# Patient Record
Sex: MALE | Race: WHITE | NOT HISPANIC OR LATINO | Employment: OTHER | URBAN - NONMETROPOLITAN AREA
[De-identification: names, ages, dates, MRNs, and addresses within clinical notes are randomized per-mention and may not be internally consistent; named-entity substitution may affect disease eponyms.]

---

## 2023-08-31 ENCOUNTER — HOSPITAL ENCOUNTER (INPATIENT)
Facility: HOSPITAL | Age: 78
LOS: 2 days | Discharge: HOME HEALTH | DRG: 177 | End: 2023-09-05
Attending: INTERNAL MEDICINE | Admitting: STUDENT IN AN ORGANIZED HEALTH CARE EDUCATION/TRAINING PROGRAM
Payer: MEDICARE

## 2023-08-31 ENCOUNTER — APPOINTMENT (OUTPATIENT)
Dept: CT IMAGING | Facility: HOSPITAL | Age: 78
DRG: 177 | End: 2023-08-31
Payer: MEDICARE

## 2023-08-31 ENCOUNTER — APPOINTMENT (OUTPATIENT)
Dept: GENERAL RADIOLOGY | Facility: HOSPITAL | Age: 78
DRG: 177 | End: 2023-08-31
Payer: MEDICARE

## 2023-08-31 DIAGNOSIS — Z74.09 IMPAIRED MOBILITY: ICD-10-CM

## 2023-08-31 DIAGNOSIS — R41.82 ALTERED MENTAL STATUS, UNSPECIFIED ALTERED MENTAL STATUS TYPE: Primary | ICD-10-CM

## 2023-08-31 DIAGNOSIS — R13.13 DYSPHAGIA, PHARYNGEAL PHASE: ICD-10-CM

## 2023-08-31 DIAGNOSIS — E11.69 TYPE 2 DIABETES MELLITUS WITH OTHER SPECIFIED COMPLICATION, UNSPECIFIED WHETHER LONG TERM INSULIN USE: ICD-10-CM

## 2023-08-31 PROBLEM — E11.9 DM2 (DIABETES MELLITUS, TYPE 2): Status: ACTIVE | Noted: 2023-08-31

## 2023-08-31 PROBLEM — I61.9 BRAIN BLEED: Status: ACTIVE | Noted: 2023-08-31

## 2023-08-31 PROBLEM — I10 BENIGN ESSENTIAL HTN: Status: ACTIVE | Noted: 2023-08-31

## 2023-08-31 PROBLEM — F03.90 DEMENTIA: Status: ACTIVE | Noted: 2023-08-31

## 2023-08-31 PROBLEM — E78.5 HYPERLIPIDEMIA: Status: ACTIVE | Noted: 2023-08-31

## 2023-08-31 PROBLEM — U07.1 COVID-19 VIRUS DETECTED: Status: ACTIVE | Noted: 2023-08-31

## 2023-08-31 LAB
ABO GROUP BLD: NORMAL
ALBUMIN SERPL-MCNC: 4.5 G/DL (ref 3.5–5.2)
ALBUMIN/GLOB SERPL: 1.6 G/DL
ALP SERPL-CCNC: 87 U/L (ref 39–117)
ALT SERPL W P-5'-P-CCNC: 19 U/L (ref 1–41)
ANION GAP SERPL CALCULATED.3IONS-SCNC: 12 MMOL/L (ref 5–15)
APTT PPP: 30.1 SECONDS (ref 24.1–35)
AST SERPL-CCNC: 18 U/L (ref 1–40)
B PARAPERT DNA SPEC QL NAA+PROBE: NOT DETECTED
B PERT DNA SPEC QL NAA+PROBE: NOT DETECTED
BASOPHILS # BLD AUTO: 0.03 10*3/MM3 (ref 0–0.2)
BASOPHILS NFR BLD AUTO: 0.3 % (ref 0–1.5)
BILIRUB SERPL-MCNC: 0.5 MG/DL (ref 0–1.2)
BILIRUB UR QL STRIP: NEGATIVE
BLD GP AB SCN SERPL QL: NEGATIVE
BUN SERPL-MCNC: 22 MG/DL (ref 8–23)
BUN/CREAT SERPL: 26.8 (ref 7–25)
C PNEUM DNA NPH QL NAA+NON-PROBE: NOT DETECTED
CALCIUM SPEC-SCNC: 9.9 MG/DL (ref 8.6–10.5)
CHLORIDE SERPL-SCNC: 96 MMOL/L (ref 98–107)
CLARITY UR: CLEAR
CO2 SERPL-SCNC: 26 MMOL/L (ref 22–29)
COLOR UR: YELLOW
CREAT SERPL-MCNC: 0.82 MG/DL (ref 0.76–1.27)
DEPRECATED RDW RBC AUTO: 44 FL (ref 37–54)
EGFRCR SERPLBLD CKD-EPI 2021: 88.8 ML/MIN/1.73
EOSINOPHIL # BLD AUTO: 0.01 10*3/MM3 (ref 0–0.4)
EOSINOPHIL NFR BLD AUTO: 0.1 % (ref 0.3–6.2)
ERYTHROCYTE [DISTWIDTH] IN BLOOD BY AUTOMATED COUNT: 13.1 % (ref 12.3–15.4)
FLUAV SUBTYP SPEC NAA+PROBE: NOT DETECTED
FLUBV RNA ISLT QL NAA+PROBE: NOT DETECTED
GLOBULIN UR ELPH-MCNC: 2.8 GM/DL
GLUCOSE BLDC GLUCOMTR-MCNC: 191 MG/DL (ref 70–130)
GLUCOSE SERPL-MCNC: 286 MG/DL (ref 65–99)
GLUCOSE UR STRIP-MCNC: ABNORMAL MG/DL
HADV DNA SPEC NAA+PROBE: NOT DETECTED
HCOV 229E RNA SPEC QL NAA+PROBE: NOT DETECTED
HCOV HKU1 RNA SPEC QL NAA+PROBE: NOT DETECTED
HCOV NL63 RNA SPEC QL NAA+PROBE: NOT DETECTED
HCOV OC43 RNA SPEC QL NAA+PROBE: NOT DETECTED
HCT VFR BLD AUTO: 46.5 % (ref 37.5–51)
HGB BLD-MCNC: 15 G/DL (ref 13–17.7)
HGB UR QL STRIP.AUTO: NEGATIVE
HMPV RNA NPH QL NAA+NON-PROBE: NOT DETECTED
HOLD SPECIMEN: NORMAL
HOLD SPECIMEN: NORMAL
HPIV1 RNA ISLT QL NAA+PROBE: NOT DETECTED
HPIV2 RNA SPEC QL NAA+PROBE: NOT DETECTED
HPIV3 RNA NPH QL NAA+PROBE: NOT DETECTED
HPIV4 P GENE NPH QL NAA+PROBE: NOT DETECTED
IMM GRANULOCYTES # BLD AUTO: 0.03 10*3/MM3 (ref 0–0.05)
IMM GRANULOCYTES NFR BLD AUTO: 0.3 % (ref 0–0.5)
INR PPP: 0.94 (ref 0.91–1.09)
KETONES UR QL STRIP: NEGATIVE
LEUKOCYTE ESTERASE UR QL STRIP.AUTO: NEGATIVE
LYMPHOCYTES # BLD AUTO: 0.82 10*3/MM3 (ref 0.7–3.1)
LYMPHOCYTES NFR BLD AUTO: 8.3 % (ref 19.6–45.3)
M PNEUMO IGG SER IA-ACNC: NOT DETECTED
MCH RBC QN AUTO: 29.6 PG (ref 26.6–33)
MCHC RBC AUTO-ENTMCNC: 32.3 G/DL (ref 31.5–35.7)
MCV RBC AUTO: 91.9 FL (ref 79–97)
MONOCYTES # BLD AUTO: 0.84 10*3/MM3 (ref 0.1–0.9)
MONOCYTES NFR BLD AUTO: 8.5 % (ref 5–12)
NEUTROPHILS NFR BLD AUTO: 8.2 10*3/MM3 (ref 1.7–7)
NEUTROPHILS NFR BLD AUTO: 82.5 % (ref 42.7–76)
NITRITE UR QL STRIP: NEGATIVE
NRBC BLD AUTO-RTO: 0 /100 WBC (ref 0–0.2)
PH UR STRIP.AUTO: 6.5 [PH] (ref 5–8)
PLATELET # BLD AUTO: 170 10*3/MM3 (ref 140–450)
PMV BLD AUTO: 12.1 FL (ref 6–12)
POTASSIUM SERPL-SCNC: 4.6 MMOL/L (ref 3.5–5.2)
PROT SERPL-MCNC: 7.3 G/DL (ref 6–8.5)
PROT UR QL STRIP: NEGATIVE
PROTHROMBIN TIME: 12.7 SECONDS (ref 11.8–14.8)
RBC # BLD AUTO: 5.06 10*6/MM3 (ref 4.14–5.8)
RH BLD: POSITIVE
RHINOVIRUS RNA SPEC NAA+PROBE: NOT DETECTED
RSV RNA NPH QL NAA+NON-PROBE: NOT DETECTED
SARS-COV-2 RNA NPH QL NAA+NON-PROBE: DETECTED
SODIUM SERPL-SCNC: 134 MMOL/L (ref 136–145)
SP GR UR STRIP: >1.03 (ref 1–1.03)
T&S EXPIRATION DATE: NORMAL
TROPONIN T SERPL HS-MCNC: 34 NG/L
UROBILINOGEN UR QL STRIP: ABNORMAL
WBC NRBC COR # BLD: 9.93 10*3/MM3 (ref 3.4–10.8)
WHOLE BLOOD HOLD COAG: NORMAL
WHOLE BLOOD HOLD SPECIMEN: NORMAL

## 2023-08-31 PROCEDURE — 86900 BLOOD TYPING SEROLOGIC ABO: CPT | Performed by: INTERNAL MEDICINE

## 2023-08-31 PROCEDURE — G0378 HOSPITAL OBSERVATION PER HR: HCPCS

## 2023-08-31 PROCEDURE — 70498 CT ANGIOGRAPHY NECK: CPT

## 2023-08-31 PROCEDURE — 80053 COMPREHEN METABOLIC PANEL: CPT | Performed by: INTERNAL MEDICINE

## 2023-08-31 PROCEDURE — 70496 CT ANGIOGRAPHY HEAD: CPT

## 2023-08-31 PROCEDURE — 82948 REAGENT STRIP/BLOOD GLUCOSE: CPT

## 2023-08-31 PROCEDURE — 86901 BLOOD TYPING SEROLOGIC RH(D): CPT | Performed by: INTERNAL MEDICINE

## 2023-08-31 PROCEDURE — 85730 THROMBOPLASTIN TIME PARTIAL: CPT | Performed by: INTERNAL MEDICINE

## 2023-08-31 PROCEDURE — 70450 CT HEAD/BRAIN W/O DYE: CPT

## 2023-08-31 PROCEDURE — 0042T HC CT CEREBRAL PERFUSION W/WO CONTRAST: CPT

## 2023-08-31 PROCEDURE — 86850 RBC ANTIBODY SCREEN: CPT | Performed by: INTERNAL MEDICINE

## 2023-08-31 PROCEDURE — 85025 COMPLETE CBC W/AUTO DIFF WBC: CPT | Performed by: INTERNAL MEDICINE

## 2023-08-31 PROCEDURE — 81003 URINALYSIS AUTO W/O SCOPE: CPT | Performed by: INTERNAL MEDICINE

## 2023-08-31 PROCEDURE — 85610 PROTHROMBIN TIME: CPT | Performed by: INTERNAL MEDICINE

## 2023-08-31 PROCEDURE — 83036 HEMOGLOBIN GLYCOSYLATED A1C: CPT | Performed by: INTERNAL MEDICINE

## 2023-08-31 PROCEDURE — 0202U NFCT DS 22 TRGT SARS-COV-2: CPT | Performed by: INTERNAL MEDICINE

## 2023-08-31 PROCEDURE — 99285 EMERGENCY DEPT VISIT HI MDM: CPT

## 2023-08-31 PROCEDURE — 93005 ELECTROCARDIOGRAM TRACING: CPT | Performed by: INTERNAL MEDICINE

## 2023-08-31 PROCEDURE — 25510000001 IOPAMIDOL PER 1 ML: Performed by: INTERNAL MEDICINE

## 2023-08-31 PROCEDURE — 93010 ELECTROCARDIOGRAM REPORT: CPT | Performed by: INTERNAL MEDICINE

## 2023-08-31 PROCEDURE — 84484 ASSAY OF TROPONIN QUANT: CPT | Performed by: INTERNAL MEDICINE

## 2023-08-31 PROCEDURE — 71045 X-RAY EXAM CHEST 1 VIEW: CPT

## 2023-08-31 RX ORDER — LOSARTAN POTASSIUM 50 MG/1
100 TABLET ORAL DAILY
Status: ON HOLD | COMMUNITY
End: 2023-09-01

## 2023-08-31 RX ORDER — DOCUSATE SODIUM 100 MG/1
1 CAPSULE, LIQUID FILLED ORAL 2 TIMES DAILY
COMMUNITY

## 2023-08-31 RX ORDER — ATORVASTATIN CALCIUM 20 MG/1
1 TABLET, FILM COATED ORAL DAILY
COMMUNITY

## 2023-08-31 RX ORDER — LABETALOL HYDROCHLORIDE 5 MG/ML
10 INJECTION, SOLUTION INTRAVENOUS
Status: DISCONTINUED | OUTPATIENT
Start: 2023-08-31 | End: 2023-09-05 | Stop reason: HOSPADM

## 2023-08-31 RX ORDER — INSULIN GLARGINE 100 [IU]/ML
10 INJECTION, SOLUTION SUBCUTANEOUS 2 TIMES DAILY
Status: ON HOLD | COMMUNITY
End: 2023-09-01 | Stop reason: DRUGHIGH

## 2023-08-31 RX ORDER — SODIUM CHLORIDE 0.9 % (FLUSH) 0.9 %
10 SYRINGE (ML) INJECTION EVERY 12 HOURS SCHEDULED
Status: DISCONTINUED | OUTPATIENT
Start: 2023-08-31 | End: 2023-09-05 | Stop reason: HOSPADM

## 2023-08-31 RX ORDER — LABETALOL HYDROCHLORIDE 5 MG/ML
5 INJECTION, SOLUTION INTRAVENOUS ONCE
Status: DISCONTINUED | OUTPATIENT
Start: 2023-08-31 | End: 2023-09-05 | Stop reason: HOSPADM

## 2023-08-31 RX ORDER — SODIUM CHLORIDE 0.9 % (FLUSH) 0.9 %
10 SYRINGE (ML) INJECTION AS NEEDED
Status: DISCONTINUED | OUTPATIENT
Start: 2023-08-31 | End: 2023-09-05 | Stop reason: HOSPADM

## 2023-08-31 RX ORDER — ATORVASTATIN CALCIUM 10 MG/1
20 TABLET, FILM COATED ORAL NIGHTLY
Status: DISCONTINUED | OUTPATIENT
Start: 2023-08-31 | End: 2023-09-05 | Stop reason: HOSPADM

## 2023-08-31 RX ORDER — NICOTINE POLACRILEX 4 MG
15 LOZENGE BUCCAL
Status: DISCONTINUED | OUTPATIENT
Start: 2023-08-31 | End: 2023-09-05 | Stop reason: HOSPADM

## 2023-08-31 RX ORDER — SODIUM CHLORIDE 9 MG/ML
40 INJECTION, SOLUTION INTRAVENOUS AS NEEDED
Status: DISCONTINUED | OUTPATIENT
Start: 2023-08-31 | End: 2023-09-05 | Stop reason: HOSPADM

## 2023-08-31 RX ORDER — ASCORBIC ACID 500 MG
500 TABLET ORAL DAILY
Status: DISCONTINUED | OUTPATIENT
Start: 2023-09-01 | End: 2023-09-05 | Stop reason: HOSPADM

## 2023-08-31 RX ORDER — INSULIN LISPRO 100 [IU]/ML
2-7 INJECTION, SOLUTION INTRAVENOUS; SUBCUTANEOUS
Status: DISCONTINUED | OUTPATIENT
Start: 2023-09-01 | End: 2023-09-05 | Stop reason: HOSPADM

## 2023-08-31 RX ORDER — ACETAMINOPHEN 650 MG/1
650 SUPPOSITORY RECTAL EVERY 4 HOURS PRN
Status: DISCONTINUED | OUTPATIENT
Start: 2023-08-31 | End: 2023-09-05 | Stop reason: HOSPADM

## 2023-08-31 RX ORDER — DEXTROSE MONOHYDRATE 25 G/50ML
25 INJECTION, SOLUTION INTRAVENOUS
Status: DISCONTINUED | OUTPATIENT
Start: 2023-08-31 | End: 2023-09-05 | Stop reason: HOSPADM

## 2023-08-31 RX ORDER — SODIUM CHLORIDE 9 MG/ML
75 INJECTION, SOLUTION INTRAVENOUS CONTINUOUS
Status: DISCONTINUED | OUTPATIENT
Start: 2023-08-31 | End: 2023-09-01

## 2023-08-31 RX ORDER — MEMANTINE HYDROCHLORIDE 7 MG/1
1 CAPSULE, EXTENDED RELEASE ORAL DAILY
COMMUNITY

## 2023-08-31 RX ORDER — AMLODIPINE BESYLATE 10 MG/1
10 TABLET ORAL DAILY
COMMUNITY

## 2023-08-31 RX ORDER — ONDANSETRON 2 MG/ML
4 INJECTION INTRAMUSCULAR; INTRAVENOUS EVERY 6 HOURS PRN
Status: DISCONTINUED | OUTPATIENT
Start: 2023-08-31 | End: 2023-09-05 | Stop reason: HOSPADM

## 2023-08-31 RX ORDER — ZINC SULFATE 50(220)MG
220 CAPSULE ORAL DAILY
Status: DISCONTINUED | OUTPATIENT
Start: 2023-09-01 | End: 2023-09-05 | Stop reason: HOSPADM

## 2023-08-31 RX ORDER — ACETAMINOPHEN 325 MG/1
650 TABLET ORAL EVERY 4 HOURS PRN
Status: DISCONTINUED | OUTPATIENT
Start: 2023-08-31 | End: 2023-09-05 | Stop reason: HOSPADM

## 2023-08-31 RX ORDER — MEMANTINE HYDROCHLORIDE 5 MG/1
2.5 TABLET ORAL EVERY 12 HOURS SCHEDULED
Status: DISCONTINUED | OUTPATIENT
Start: 2023-09-01 | End: 2023-09-05 | Stop reason: HOSPADM

## 2023-08-31 RX ADMIN — Medication 10 ML: at 23:33

## 2023-08-31 RX ADMIN — SODIUM CHLORIDE 75 ML/HR: 900 INJECTION INTRAVENOUS at 23:33

## 2023-08-31 RX ADMIN — IOPAMIDOL 125 ML: 755 INJECTION, SOLUTION INTRAVENOUS at 18:00

## 2023-08-31 NOTE — ED PROVIDER NOTES
Subjective   History of Present Illness  78-year-old male who presents to the emergency department for altered mental status.  Around 3 PM he became combative and confused.  He is currently at [facility].  He was recently at a [facility] facility.  He had a fall on August 22.  He was found at that time to have a brain bleed.  His platelets were noted to be 20.  At that time he was on Plavix and aspirin.  He is currently not taking any medications.  He has not had history of UTI in the past.  The patient is denying any type of pain.  He does occasionally hold the front part of his head.  His motion with the right arm is not purposeful.  He has generalized weakness.  He will not make eye contact.    Review of Systems   Neurological:  Negative for headaches.   Psychiatric/Behavioral:  Positive for agitation and confusion.      PMH:  Brain hemorrhage.     No Known Allergies    No past surgical history on file.    No family history on file.    Social History     Socioeconomic History    Marital status:            Objective   Physical Exam  Vitals reviewed.   Constitutional:       Appearance: He is ill-appearing.   HENT:      Head: Normocephalic and atraumatic.      Nose: Nose normal.   Eyes:      Conjunctiva/sclera: Conjunctivae normal.   Cardiovascular:      Rate and Rhythm: Normal rate and regular rhythm.      Heart sounds: Normal heart sounds.   Pulmonary:      Effort: Pulmonary effort is normal.      Breath sounds: Normal breath sounds.   Abdominal:      General: Bowel sounds are normal.      Palpations: Abdomen is soft.   Musculoskeletal:         General: No tenderness.      Cervical back: Normal range of motion and neck supple.   Skin:     General: Skin is warm and dry.   Neurological:      Mental Status: He is alert. He is lethargic, disoriented and confused.      Cranial Nerves: No cranial nerve deficit or facial asymmetry.      Sensory: No sensory deficit.      Motor: Weakness present.       Coordination: Coordination abnormal.      Comments: Minimally responsive.  The patient has discrimination, and right-handed motion is not purposeful.  He will move the left and right leg but minimally.  He notes no sensation loss in the face.  He states he is not having a headache, but does hold his head.  He denies any other type of pain.  He will not hold his eyes open to make eye contact.   Psychiatric:      Comments: Drowsy with minimal responses.         Procedures           ED Course  ED Course as of 08/31/23 2040   Thu Aug 31, 2023   1906 I went to check on the patient.  He is sitting up.  Appears slightly improved.  He is able to tell me that he does feel better.  Discussed possible subacute bleeding seen on CT scans.  Also discussed with family that continued to wait on work-up. [AJ]   1959 Spoke with Dr. Anna. Per my verbal report, appears to be a week old bleed and there is nothing acute to do at this time.  [AJ]   2002 CMP remains pending.  [AJ]   2028 Care giver notes some cough and congestion. Will get COVID [AJ]   2033 Spoke with MD. He is agreeable to obs admission.  [AJ]      ED Course User Index  [AJ] Loida Antunez, DO                Lab Results (last 24 hours)       Procedure Component Value Units Date/Time    CBC & Differential [[id number]]  (Abnormal) Collected: 08/31/23 1815    Specimen: Blood Updated: 08/31/23 1830    Narrative:      The following orders were created for panel order CBC & Differential.  Procedure                               Abnormality         Status                     ---------                               -----------         ------                     CBC Auto Differential[[id number]]        Abnormal            Final result                 Please view results for these tests on the individual orders.    CBC Auto Differential [[id number]]  (Abnormal) Collected: 08/31/23 1815    Specimen: Blood Updated: 08/31/23 1830     WBC 9.93 10*3/mm3      RBC 5.06 10*6/mm3       Hemoglobin 15.0 g/dL      Hematocrit 46.5 %      MCV 91.9 fL      MCH 29.6 pg      MCHC 32.3 g/dL      RDW 13.1 %      RDW-SD 44.0 fl      MPV 12.1 fL      Platelets 170 10*3/mm3      Neutrophil % 82.5 %      Lymphocyte % 8.3 %      Monocyte % 8.5 %      Eosinophil % 0.1 %      Basophil % 0.3 %      Immature Grans % 0.3 %      Neutrophils, Absolute 8.20 10*3/mm3      Lymphocytes, Absolute 0.82 10*3/mm3      Monocytes, Absolute 0.84 10*3/mm3      Eosinophils, Absolute 0.01 10*3/mm3      Basophils, Absolute 0.03 10*3/mm3      Immature Grans, Absolute 0.03 10*3/mm3      nRBC 0.0 /100 WBC     Protime-INR [[id number]]  (Normal) Collected: 08/31/23 1823    Specimen: Blood Updated: 08/31/23 1842     Protime 12.7 Seconds      INR 0.94    aPTT [[id number]]  (Normal) Collected: 08/31/23 1823    Specimen: Blood Updated: 08/31/23 1842     PTT 30.1 seconds     Urinalysis With Culture If Indicated - Urine, Clean Catch [[id number]]  (Abnormal) Collected: 08/31/23 1841    Specimen: Urine, Clean Catch Updated: 08/31/23 1902     Color, UA Yellow     Appearance, UA Clear     pH, UA 6.5     Specific Gravity, UA >1.030     Glucose, UA >=1000 mg/dL (3+)     Ketones, UA Negative     Bilirubin, UA Negative     Blood, UA Negative     Protein, UA Negative     Leuk Esterase, UA Negative     Nitrite, UA Negative     Urobilinogen, UA 0.2 E.U./dL    Narrative:      In absence of clinical symptoms, the presence of pyuria, bacteria, and/or nitrites on the urinalysis result does not correlate with infection.  Urine microscopic not indicated.    Comprehensive Metabolic Panel [[id number]]  (Abnormal) Collected: 08/31/23 1928    Specimen: Blood Updated: 08/31/23 2007     Glucose 286 mg/dL      BUN 22 mg/dL      Creatinine 0.82 mg/dL      Sodium 134 mmol/L      Potassium 4.6 mmol/L      Comment: Slight hemolysis detected by analyzer. Results may be affected.        Chloride 96 mmol/L      CO2 26.0 mmol/L      Calcium 9.9 mg/dL      Total Protein 7.3 g/dL       Albumin 4.5 g/dL      ALT (SGPT) 19 U/L      AST (SGOT) 18 U/L      Comment: Slight hemolysis detected by analyzer. Results may be affected.        Alkaline Phosphatase 87 U/L      Total Bilirubin 0.5 mg/dL      Globulin 2.8 gm/dL      A/G Ratio 1.6 g/dL      BUN/Creatinine Ratio 26.8     Anion Gap 12.0 mmol/L      eGFR 88.8 mL/min/1.73     Narrative:      GFR Normal >60  Chronic Kidney Disease <60  Kidney Failure <15    The GFR formula is only valid for adults with stable renal function between ages 18 and 70.    Single High Sensitivity Troponin T [[id number]]  (Abnormal) Collected: 08/31/23 1928    Specimen: Blood Updated: 08/31/23 2000     HS Troponin T 34 ng/L     Narrative:      High Sensitive Troponin T Reference Range:  <10.0 ng/L- Negative Female for AMI  <15.0 ng/L- Negative Male for AMI  >=10 - Abnormal Female indicating possible myocardial injury.  >=15 - Abnormal Male indicating possible myocardial injury.   Clinicians would have to utilize clinical acumen, EKG, Troponin, and serial changes to determine if it is an Acute Myocardial Infarction or myocardial injury due to an underlying chronic condition.               XR Chest 1 View   Final Result   1. No acute disease.   This report was finalized on 08/31/2023 18:57 by Dr. Martinez Gonzales MD.      CT CEREBRAL PERFUSION WITH & WITHOUT CONTRAST   Final Result   Impression:   1. There is noted to be delayed transit time of contrast in the left   basal ganglia at the site of a recent intra-axial hemorrhage. There is   associated edema and some mass effect with subtle effacement of the   frontal horn of the left lateral ventricle at this site related to the   recent hemorrhage likely accounting for the delayed perfusion. I do not   see evidence of core infarct or significant penumbra to suggest large   vessel occlusion/acute ischemia.   This report was finalized on 08/31/2023 18:42 by Dr. Martinez Gonzales MD.      CT Angiogram Head w AI Analysis of  LVO   Final Result   1.. Calcific plaquing involving the intracranial segment of the left   vertebral artery without evidence of rate limiting stenosis. There is a   small short segment stenosis within the basilar artery above the   anterior inferior cerebellar artery origin. The superior cerebellar   arteries are widely patent. The right posterior cerebral artery   represents a persistent fetal origin from the anterior circulation. The   left PCA emanates from the basilar artery but also has a contribution   from the left posterior communicating artery.   2. Calcific plaquing involving the petrous segment of the right ICA as   well as the cavernous and proximal supraclinoid segment of both ICAs   with mild associated stenosis. Both ICAs are patent to the terminus. The   anterior and middle cerebral arteries are normal in appearance with no   intraluminal thrombus or berry aneurysm demonstrated. No focal stenosis   is appreciated within the anterior or middle cerebral arteries.   3. 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   This is slightly more prominent on the right than on the left with an   approximate 40% cross-sectional stenosis within the proximal right ICA   and a less than 25% cross-sectional stenosis at the level of the left   carotid bulb. The more distal extracranial ICAs are tortuous but   otherwise patent.   This report was finalized on 08/31/2023 18:37 by Dr. Martinez Gonzales MD.      CT Angiogram Neck   Final Result   1.. Calcific plaquing involving the intracranial segment of the left   vertebral artery without evidence of rate limiting stenosis. There is a   small short segment stenosis within the basilar artery above the   anterior inferior cerebellar artery origin. The superior cerebellar   arteries are widely patent. The right posterior cerebral artery   represents a persistent fetal origin from the anterior circulation. The   left PCA emanates from the basilar  artery but also has a contribution   from the left posterior communicating artery.   2. Calcific plaquing involving the petrous segment of the right ICA as   well as the cavernous and proximal supraclinoid segment of both ICAs   with mild associated stenosis. Both ICAs are patent to the terminus. The   anterior and middle cerebral arteries are normal in appearance with no   intraluminal thrombus or berry aneurysm demonstrated. No focal stenosis   is appreciated within the anterior or middle cerebral arteries.   3. 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   This is slightly more prominent on the right than on the left with an   approximate 40% cross-sectional stenosis within the proximal right ICA   and a less than 25% cross-sectional stenosis at the level of the left   carotid bulb. The more distal extracranial ICAs are tortuous but   otherwise patent.   This report was finalized on 08/31/2023 18:37 by Dr. Martinez Gonzales MD.      CT Head Without Contrast Stroke Protocol   Final Result   1.. Subtle hyperdensity within the left basal ganglia with some mass   effect and mild effacement of the frontal horn of the left lateral   ventricle. Radiographically I would favor that this represents a focus   of subacute intra-axial hemorrhage. There is evidence of blood layering   dependently within the posterior horn of both lateral ventricles. No   evidence of acute appearing intra or extra-axial hemorrhage although the   blood layering within the posterior horn of the left lateral ventricle   is relatively hyperdense.   2. Focal hyperdensity which is more linear distribution within the   mesial left temporal lobe. This could potentially represent some   hyperdense clot within a left M2 vessel versus an additional small focus   of intra-axial hemorrhage. This is different in density in comparison   with the adjacent hemorrhage in the left basal ganglia. CT angiography   may be helpful for further  characterization.   3. Diffuse atrophy and small vessel disease. 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 Remote right basal ganglia   and left thalamic lacunar infarcts are also present.       This report was finalized on 08/31/2023 18:03 by Dr. Martinez Gonzales MD.                                   Medical Decision Making  Problems Addressed:  Altered mental status, unspecified altered mental status type: complicated acute illness or injury    Amount and/or Complexity of Data Reviewed  Labs: ordered.  Radiology: ordered.  ECG/medicine tests: ordered.    Risk  Prescription drug management.  Decision regarding hospitalization.        Final diagnoses:   Altered mental status, unspecified altered mental status type       ED Disposition  ED Disposition       ED Disposition   Decision to Admit    Condition   --    Comment   Level of Care: Med/Surg [1]   Diagnosis: AMS (altered mental status) [0429943]   Admitting Physician: JOEL SERRA [[id number]]                 No follow-up provider specified.       Medication List      No changes were made to your prescriptions during this visit.            Loida Antunez,   08/31/23 1742       Loida Antunez DO  08/31/23 2040

## 2023-09-01 ENCOUNTER — APPOINTMENT (OUTPATIENT)
Dept: MRI IMAGING | Facility: HOSPITAL | Age: 78
DRG: 177 | End: 2023-09-01
Payer: MEDICARE

## 2023-09-01 LAB
ALBUMIN SERPL-MCNC: 4 G/DL (ref 3.5–5.2)
ALBUMIN/GLOB SERPL: 1.3 G/DL
ALP SERPL-CCNC: 84 U/L (ref 39–117)
ALT SERPL W P-5'-P-CCNC: 16 U/L (ref 1–41)
ANION GAP SERPL CALCULATED.3IONS-SCNC: 13 MMOL/L (ref 5–15)
AST SERPL-CCNC: 18 U/L (ref 1–40)
BILIRUB SERPL-MCNC: 0.5 MG/DL (ref 0–1.2)
BUN SERPL-MCNC: 18 MG/DL (ref 8–23)
BUN/CREAT SERPL: 23.4 (ref 7–25)
CALCIUM SPEC-SCNC: 9.9 MG/DL (ref 8.6–10.5)
CHLORIDE SERPL-SCNC: 98 MMOL/L (ref 98–107)
CHOLEST SERPL-MCNC: 126 MG/DL (ref 0–200)
CO2 SERPL-SCNC: 22 MMOL/L (ref 22–29)
CREAT SERPL-MCNC: 0.77 MG/DL (ref 0.76–1.27)
DEPRECATED RDW RBC AUTO: 44.2 FL (ref 37–54)
EGFRCR SERPLBLD CKD-EPI 2021: 90.5 ML/MIN/1.73
ERYTHROCYTE [DISTWIDTH] IN BLOOD BY AUTOMATED COUNT: 13 % (ref 12.3–15.4)
GLOBULIN UR ELPH-MCNC: 3.2 GM/DL
GLUCOSE BLDC GLUCOMTR-MCNC: 169 MG/DL (ref 70–130)
GLUCOSE BLDC GLUCOMTR-MCNC: 176 MG/DL (ref 70–130)
GLUCOSE BLDC GLUCOMTR-MCNC: 189 MG/DL (ref 70–130)
GLUCOSE BLDC GLUCOMTR-MCNC: 258 MG/DL (ref 70–130)
GLUCOSE SERPL-MCNC: 185 MG/DL (ref 65–99)
HBA1C MFR BLD: 8 % (ref 4.8–5.6)
HCT VFR BLD AUTO: 49.2 % (ref 37.5–51)
HDLC SERPL-MCNC: 47 MG/DL (ref 40–60)
HGB BLD-MCNC: 15.4 G/DL (ref 13–17.7)
LDLC SERPL CALC-MCNC: 66 MG/DL (ref 0–100)
LDLC/HDLC SERPL: 1.43 {RATIO}
MAGNESIUM SERPL-MCNC: 2 MG/DL (ref 1.6–2.4)
MCH RBC QN AUTO: 29.2 PG (ref 26.6–33)
MCHC RBC AUTO-ENTMCNC: 31.3 G/DL (ref 31.5–35.7)
MCV RBC AUTO: 93.2 FL (ref 79–97)
PLATELET # BLD AUTO: 149 10*3/MM3 (ref 140–450)
PMV BLD AUTO: 11.8 FL (ref 6–12)
POTASSIUM SERPL-SCNC: 4.3 MMOL/L (ref 3.5–5.2)
PROT SERPL-MCNC: 7.2 G/DL (ref 6–8.5)
RBC # BLD AUTO: 5.28 10*6/MM3 (ref 4.14–5.8)
SODIUM SERPL-SCNC: 133 MMOL/L (ref 136–145)
TRIGL SERPL-MCNC: 59 MG/DL (ref 0–150)
TSH SERPL DL<=0.05 MIU/L-ACNC: 2.92 UIU/ML (ref 0.27–4.2)
VLDLC SERPL-MCNC: 13 MG/DL (ref 5–40)
WBC NRBC COR # BLD: 8.62 10*3/MM3 (ref 3.4–10.8)

## 2023-09-01 PROCEDURE — G0378 HOSPITAL OBSERVATION PER HR: HCPCS

## 2023-09-01 PROCEDURE — 83735 ASSAY OF MAGNESIUM: CPT | Performed by: CLINICAL NURSE SPECIALIST

## 2023-09-01 PROCEDURE — 82948 REAGENT STRIP/BLOOD GLUCOSE: CPT

## 2023-09-01 PROCEDURE — 63710000001 INSULIN LISPRO (HUMAN) PER 5 UNITS: Performed by: INTERNAL MEDICINE

## 2023-09-01 PROCEDURE — 70551 MRI BRAIN STEM W/O DYE: CPT

## 2023-09-01 PROCEDURE — 92610 EVALUATE SWALLOWING FUNCTION: CPT

## 2023-09-01 PROCEDURE — 85027 COMPLETE CBC AUTOMATED: CPT | Performed by: INTERNAL MEDICINE

## 2023-09-01 PROCEDURE — 99205 OFFICE O/P NEW HI 60 MIN: CPT | Performed by: NURSE PRACTITIONER

## 2023-09-01 PROCEDURE — 97166 OT EVAL MOD COMPLEX 45 MIN: CPT

## 2023-09-01 PROCEDURE — 99222 1ST HOSP IP/OBS MODERATE 55: CPT | Performed by: CLINICAL NURSE SPECIALIST

## 2023-09-01 PROCEDURE — 80053 COMPREHEN METABOLIC PANEL: CPT | Performed by: INTERNAL MEDICINE

## 2023-09-01 PROCEDURE — 97163 PT EVAL HIGH COMPLEX 45 MIN: CPT

## 2023-09-01 PROCEDURE — 84443 ASSAY THYROID STIM HORMONE: CPT | Performed by: INTERNAL MEDICINE

## 2023-09-01 PROCEDURE — 80061 LIPID PANEL: CPT | Performed by: INTERNAL MEDICINE

## 2023-09-01 RX ORDER — LOSARTAN POTASSIUM 100 MG/1
1 TABLET ORAL DAILY
COMMUNITY

## 2023-09-01 RX ORDER — BISACODYL 10 MG
1 SUPPOSITORY, RECTAL RECTAL DAILY PRN
COMMUNITY

## 2023-09-01 RX ORDER — INSULIN GLARGINE 100 [IU]/ML
5 INJECTION, SOLUTION SUBCUTANEOUS DAILY
COMMUNITY
End: 2023-09-05 | Stop reason: HOSPADM

## 2023-09-01 RX ADMIN — ATORVASTATIN CALCIUM 20 MG: 10 TABLET, FILM COATED ORAL at 21:02

## 2023-09-01 RX ADMIN — INSULIN LISPRO 4 UNITS: 100 INJECTION, SOLUTION INTRAVENOUS; SUBCUTANEOUS at 21:10

## 2023-09-01 RX ADMIN — MEMANTINE HYDROCHLORIDE 2.5 MG: 5 TABLET, FILM COATED ORAL at 21:02

## 2023-09-01 RX ADMIN — Medication 10 ML: at 21:02

## 2023-09-01 RX ADMIN — INSULIN LISPRO 2 UNITS: 100 INJECTION, SOLUTION INTRAVENOUS; SUBCUTANEOUS at 17:47

## 2023-09-01 NOTE — PLAN OF CARE
Goal Outcome Evaluation:  Plan of Care Reviewed With: patient, grandchild(javon)        Progress: no change  Outcome Evaluation: OT eval completed. Pt in fowlers upon therapist arriva; A&O to person only; No pain reported; Pt's granddaughter also present. Pt performed supine>sit utilizing bedrails with Max A and verbal cues for sequencing and positioning; sit>supine Mod A. Pt performed sit<>stand with Mod A and verbal/visual/tactile cues for use of proper body mechanics; Pt demonstrates constant R lateral lean in sitting and standing positions that he was unable to correct with cueing. Pt ambulated short distance in room utilizing rwx with Max A and constant verbal/visual/tactile cues for safety awareness, positioning of AD, and body mechanics due to constant R lateral lean. Pt additionally demonstrates BUE FM/GM coordination deficits. Skilled OT intervention indicated in order to address deficits in fxl mobility, fxl activity tolerance, balance, coordination, strength, and use of adaptive techniques/equipment during performance of BADLs. Recommend SNF at discharge.      Anticipated Discharge Disposition (OT): skilled nursing facility

## 2023-09-01 NOTE — CONSULTS
[facility] Medicine Services  INPATIENT PROGRESS NOTE    Patient Name: Bert Pereira  Date of Admission: 8/31/2023  Today's Date: 09/01/23  Length of Stay: 0  Primary Care Physician: Provider, No Known    Subjective   Chief Complaint: Confusion  HPI   78-year-old man with past medical history of hypertension diabetes mellitus and recurrent ischemic stroke, has recently about 10 days ago was diagnosed with hemorrhagic stroke treated conservatively and patient sent to rehab, patient before the hemorrhagic stroke with independent but after that there is significant decline of the cognitive function, in rehab it was noted that the patient has significant decline in his alertness was not able to cooperate with physical therapy and this was dramatic change so they sent him to our hospital, in the ER CT scan have shown that there is hemologic changes and neurosurgery was consulted to consider this residual effect of the recent hemorrhagic stroke and no active bleed is there and recommends continuing conservative management, we need to rule out stroke, initial CT perfusion scan of the neck and CT angiogram of the head and neck is not impressive of major occlusion of large blood vessel with some element of mild ICA stenosis bilateral.  Testing for COVID in the ER came positive but patient is saturating well in room air.  I encountered the patient today in the presence of his granddaughter, patient is confused not answering the question but open his eyes spontaneously, granddaughter updated and all her questions answered        Review of Systems   Cannot be obtained as patient is not communicating    Objective    Temp:  [97.3 °F (36.3 °C)-99.5 °F (37.5 °C)] 98.7 °F (37.1 °C)  Heart Rate:  [] 91  Resp:  [16-18] 16  BP: (139-160)/() 141/78  Physical Exam  General patient is confused obtunded not responding to the questions  Neck no elevated jugular venous pressure, no cervical  adenopathy  Chest no tachypnea saturating well on room air chest clear  air entry with no rhonchi or crepitations  Heart regular rate and rhythm with no murmurs or gallop  Abdomen soft with no tenderness no rigidity no organomegaly      Results Review:  I have reviewed the labs, radiology results, and diagnostic studies.    Laboratory Data:   Results from last 7 days   Lab Units 09/01/23  0454 08/31/23  1815 08/30/23  1030   WBC 10*3/mm3 8.62 9.93 9.43   HEMOGLOBIN g/dL 15.4 15.0 15.1   HEMATOCRIT % 49.2 46.5 47.6   PLATELETS 10*3/mm3 149 170 199        Results from last 7 days   Lab Units 09/01/23  0454 08/31/23  1928 08/30/23  1030   SODIUM mmol/L 133* 134* 139   POTASSIUM mmol/L 4.3 4.6 4.2   CHLORIDE mmol/L 98 96* 98   CO2 mmol/L 22.0 26.0 28.0   BUN mg/dL 18 22 24*   CREATININE mg/dL 0.77 0.82 0.81   CALCIUM mg/dL 9.9 9.9 10.5   BILIRUBIN mg/dL 0.5 0.5 0.5   ALK PHOS U/L 84 87 92   ALT (SGPT) U/L 16 19 17   AST (SGOT) U/L 18 18 14   GLUCOSE mg/dL 185* 286* 268*       Culture Data:   No results found for: BLOODCX, URINECX, WOUNDCX, MRSACX, RESPCX, STOOLCX    Radiology Data:   Imaging Results (Last 24 Hours)       Procedure Component Value Units Date/Time    XR Chest 1 View [[id number]] Collected: 08/31/23 1857     Updated: 08/31/23 1900    Narrative:      EXAMINATION: Chest 1 view 08/31/2023     HISTORY: Acute stroke protocol.     FINDINGS: Upright frontal projection of the chest demonstrates mild  cardiomegaly. The thoracic aorta and great vessels are ectatic. Lungs  are fully expanded and clear. No consolidative pneumonia or effusion.       Impression:      1. No acute disease.  This report was finalized on 08/31/2023 18:57 by Dr. Martinez Gonzales MD.    CT CEREBRAL PERFUSION WITH & WITHOUT CONTRAST [[id number]] Collected: 08/31/23 1838     Updated: 08/31/23 1845    Narrative:      Indication: Confusion     Exam:      1. Perfusion CT is performed to acquire images tracking the temporal  course of iodinated  contrast material passing through the cerebral  circulation. Perfusion parameters, such as cerebral blood flow (CBF),  cerebral blood volume (CBV), mean transit time (MTT), etc. are  calculated by [employer] with additional provided perfusion maps and  estimated stroke volumes.  2. Automated exposure control (AEC) protocols are utilized on the  scanner to ensure dose lowered technique.      Comparison: Noncontrast CT brain and brain angiogram dated 8/31/2023  5:42 PM CDT     Findings:     Abnormal perfusion exam, with size and distribution of the perfusion  abnormality described below.     Distribution: Perfusion abnormality involves the left basal ganglia and  a site of recent intra-axial hemorrhage..     Tmax >6.0 seconds volume: 11 ml     CBF < 30% volume: 0 ml     Mismatch volume: 11 ml      Mismatch ratio: Infinite       Impression:      Impression:  1. There is noted to be delayed transit time of contrast in the left  basal ganglia at the site of a recent intra-axial hemorrhage. There is  associated edema and some mass effect with subtle effacement of the  frontal horn of the left lateral ventricle at this site related to the  recent hemorrhage likely accounting for the delayed perfusion. I do not  see evidence of core infarct or significant penumbra to suggest large  vessel occlusion/acute ischemia.  This report was finalized on 08/31/2023 18:42 by Dr. Martinez Gonzales MD.    CT Angiogram Head w AI Analysis of LVO [[id number]] Collected: 08/31/23 1818     Updated: 08/31/23 1841    Narrative:      EXAMINATION: CT angiogram of the neck and head with contrast with AI  analysis for LVO. 08/31/2023     HISTORY: Acute stroke suspected. Neurologic deficit.     DOSE: 414 mGycm. All CT scans are performed using dose optimization  techniques as appropriate to the performed exam and including at least  one of the following: Automated exposure control, adjustment of the mA  and/or kV according to size, and the use of the  iterative reconstruction  technique..     FINDINGS: Multiple contiguous axial images are obtained from the aortic  arch through the vertex following intravenous contrast infusion with  reformatted images obtained in the sagittal and coronal projections from  the original data set. MIPS are also obtained.     There is atheromatous calcification of the thoracic aorta and proximal  great vessels without evidence of aneurysm or dissection. No rate  limiting stenosis at the origin of the great vessels. There are  emphysematous changes of the lungs. The thyroid is homogeneous in  density without evidence of enlargement or nodularity. Level of the true  and false cords is unremarkable. No pathologically enlarged cervical  chain or posterior triangle lymphadenopathy is present.     The origin of the vertebral arteries are widely patent. The left  vertebral artery is dominant. There is mild calcific plaquing involving  the intracranial segment of the left vertebral artery without rate  limiting stenosis. Both vertebral arteries are otherwise widely patent  to the basilar artery.     There is scattered plaquing involving the right common carotid artery  without evidence of rate limiting stenosis. There is calcific plaquing  involving the carotid bifurcation with involvement of the carotid bulb  and proximal ICA. This results in an approximate 40% cross-sectional  stenosis within the proximal right ICA. The right ICA is tortuous but  otherwise widely patent to the skull base.     Examination of the left common carotid artery demonstrates scattered  plaquing without rate limiting stenosis. There is calcific plaquing  involving the carotid bulb and proximal left ICA with a less than 25%  cross-sectional stenosis at the level of the left carotid bulb. The left  ICA is tortuous.     Examination of the [place] including AI analysis for LVO is  also performed including MIPS.     The left vertebral artery is dominant. Mild  calcific plaquing involving  the intracranial segment of the left vertebral artery does not result in  significant stenosis. Both vertebral arteries are patent to the basilar  artery. The posterior inferior cerebellar arteries are normal in  appearance. There is mild stenosis involving the mid segment of the  basilar artery. The superior cerebellar artery are widely patent. There  is a persistent fetal origin of the right posterior cerebral artery. The  left posterior cerebral artery emanates from the distal basilar artery  with a patent posterior communicating artery also present and  contributing to the posterior circulation.     Both distal extracranial ICAs are widely patent. There is calcific  plaquing involving the petrous segment of the right ICA without rate  limiting stenosis. There is calcific plaquing involving the cavernous  segment of both ICAs with mild associated stenosis. There is also  calcific plaquing involving the distal cavernous and proximal  supraclinoid segment of both ICAs with mild associated bilateral  stenosis. Both vessels are patent to the terminus. The anterior and  middle cerebral arteries are normal in appearance with no evidence of  intraluminal thrombus or berry aneurysm. No focal steno-occlusive  disease. Previously described focus of linear density within the mesial  left temporal lobe appears to be separable from the left middle cerebral  artery branches       Impression:      1.. Calcific plaquing involving the intracranial segment of the left  vertebral artery without evidence of rate limiting stenosis. There is a  small short segment stenosis within the basilar artery above the  anterior inferior cerebellar artery origin. The superior cerebellar  arteries are widely patent. The right posterior cerebral artery  represents a persistent fetal origin from the anterior circulation. The  left PCA emanates from the basilar artery but also has a contribution  from the left posterior  communicating artery.  2. Calcific plaquing involving the petrous segment of the right ICA as  well as the cavernous and proximal supraclinoid segment of both ICAs  with mild associated stenosis. Both ICAs are patent to the terminus. The  anterior and middle cerebral arteries are normal in appearance with no  intraluminal thrombus or berry aneurysm demonstrated. No focal stenosis  is appreciated within the anterior or middle cerebral arteries.  3. 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  This is slightly more prominent on the right than on the left with an  approximate 40% cross-sectional stenosis within the proximal right ICA  and a less than 25% cross-sectional stenosis at the level of the left  carotid bulb. The more distal extracranial ICAs are tortuous but  otherwise patent.  This report was finalized on 08/31/2023 18:37 by Dr. Martinez Gonzales MD.    CT Angiogram Neck [[id number]] Collected: 08/31/23 1818     Updated: 08/31/23 1841    Narrative:      EXAMINATION: CT angiogram of the neck and head with contrast with AI  analysis for LVO. 08/31/2023     HISTORY: Acute stroke suspected. Neurologic deficit.     DOSE: 414 mGycm. All CT scans are performed using dose optimization  techniques as appropriate to the performed exam and including at least  one of the following: Automated exposure control, adjustment of the mA  and/or kV according to size, and the use of the iterative reconstruction  technique..     FINDINGS: Multiple contiguous axial images are obtained from the aortic  arch through the vertex following intravenous contrast infusion with  reformatted images obtained in the sagittal and coronal projections from  the original data set. MIPS are also obtained.     There is atheromatous calcification of the thoracic aorta and proximal  great vessels without evidence of aneurysm or dissection. No rate  limiting stenosis at the origin of the great vessels. There are  emphysematous  changes of the lungs. The thyroid is homogeneous in  density without evidence of enlargement or nodularity. Level of the true  and false cords is unremarkable. No pathologically enlarged cervical  chain or posterior triangle lymphadenopathy is present.     The origin of the vertebral arteries are widely patent. The left  vertebral artery is dominant. There is mild calcific plaquing involving  the intracranial segment of the left vertebral artery without rate  limiting stenosis. Both vertebral arteries are otherwise widely patent  to the basilar artery.     There is scattered plaquing involving the right common carotid artery  without evidence of rate limiting stenosis. There is calcific plaquing  involving the carotid bifurcation with involvement of the carotid bulb  and proximal ICA. This results in an approximate 40% cross-sectional  stenosis within the proximal right ICA. The right ICA is tortuous but  otherwise widely patent to the skull base.     Examination of the left common carotid artery demonstrates scattered  plaquing without rate limiting stenosis. There is calcific plaquing  involving the carotid bulb and proximal left ICA with a less than 25%  cross-sectional stenosis at the level of the left carotid bulb. The left  ICA is tortuous.     Examination of the [place] including AI analysis for LVO is  also performed including MIPS.     The left vertebral artery is dominant. Mild calcific plaquing involving  the intracranial segment of the left vertebral artery does not result in  significant stenosis. Both vertebral arteries are patent to the basilar  artery. The posterior inferior cerebellar arteries are normal in  appearance. There is mild stenosis involving the mid segment of the  basilar artery. The superior cerebellar artery are widely patent. There  is a persistent fetal origin of the right posterior cerebral artery. The  left posterior cerebral artery emanates from the distal basilar  artery  with a patent posterior communicating artery also present and  contributing to the posterior circulation.     Both distal extracranial ICAs are widely patent. There is calcific  plaquing involving the petrous segment of the right ICA without rate  limiting stenosis. There is calcific plaquing involving the cavernous  segment of both ICAs with mild associated stenosis. There is also  calcific plaquing involving the distal cavernous and proximal  supraclinoid segment of both ICAs with mild associated bilateral  stenosis. Both vessels are patent to the terminus. The anterior and  middle cerebral arteries are normal in appearance with no evidence of  intraluminal thrombus or berry aneurysm. No focal steno-occlusive  disease. Previously described focus of linear density within the mesial  left temporal lobe appears to be separable from the left middle cerebral  artery branches       Impression:      1.. Calcific plaquing involving the intracranial segment of the left  vertebral artery without evidence of rate limiting stenosis. There is a  small short segment stenosis within the basilar artery above the  anterior inferior cerebellar artery origin. The superior cerebellar  arteries are widely patent. The right posterior cerebral artery  represents a persistent fetal origin from the anterior circulation. The  left PCA emanates from the basilar artery but also has a contribution  from the left posterior communicating artery.  2. Calcific plaquing involving the petrous segment of the right ICA as  well as the cavernous and proximal supraclinoid segment of both ICAs  with mild associated stenosis. Both ICAs are patent to the terminus. The  anterior and middle cerebral arteries are normal in appearance with no  intraluminal thrombus or berry aneurysm demonstrated. No focal stenosis  is appreciated within the anterior or middle cerebral arteries.  3. Mixed plaquing involving both carotid bifurcations within the  neck.  This is slightly more prominent on the right than on the left with an  approximate 40% cross-sectional stenosis within the proximal right ICA  and a less than 25% cross-sectional stenosis at the level of the left  carotid bulb. The more distal extracranial ICAs are tortuous but  otherwise patent.  This report was finalized on 08/31/2023 18:37 by Dr. Martinez Gonzales MD.    CT Head Without Contrast Stroke Protocol [[id number]] Collected: 08/31/23 1751     Updated: 08/31/23 1807    Narrative:      CT HEAD WO CONTRAST STROKE PROTOCOL- 8/31/2023 5:40 PM CDT     HISTORY: Neuro deficit, acute, stroke suspected     COMPARISON: None      DLP: 748 mGy cm. All CT scans are performed using dose optimization  techniques as appropriate to the performed exam and including at least  one of the following: Automated exposure control, adjustment of the mA  and/or kV according to size, and the use of the iterative reconstruction  technique.     TECHNIQUE: Serial axial tomographic images of the brain were obtained  without the use of intravenous contrast.      FINDINGS:   The midline structures are nondisplaced. There is diffuse atrophy of the  brain with prominence of the subarachnoid spaces and ventricular  enlargement. There is evidence of a previous posterior division right  MCA territory infarct with encephalomalacia within the right temporal  and parietal lobe. A remote infarct involving the right basal ganglia  and left thalamus are also present.     There is a subtle focus of increased density within the left basal  ganglia. This has ill-defined margins measuring approximately 2.6 x 2.0  cm in size with subtle effacement of the frontal horn of the left  lateral ventricle. I suspect this may represent a focus of subacute  hemorrhage. There is evidence of intraventricular hemorrhage with blood  layering dependently within the posterior horn of both lateral  ventricles. No evidence of acute intra-axial or extra-axial  hemorrhage.  There is also evidence of a previous right frontal lobe infarct or  posttraumatic change with encephalomalacia.     Noted on image 13 of series 5 and image 12 of series 9 is focal more  linear hyperdensity within the mesial left temporal lobe. This is in  close proximity to the M2 segments of the left MCA and could potentially  represent some clot within one of these vessels. CT angiography may be  helpful for better delineation.     The orbits are unremarkable.     The visualized paranasal sinuses and mastoid air cells are normally  aerated.       Impression:      1.. Subtle hyperdensity within the left basal ganglia with some mass  effect and mild effacement of the frontal horn of the left lateral  ventricle. Radiographically I would favor that this represents a focus  of subacute intra-axial hemorrhage. There is evidence of blood layering  dependently within the posterior horn of both lateral ventricles. No  evidence of acute appearing intra or extra-axial hemorrhage although the  blood layering within the posterior horn of the left lateral ventricle  is relatively hyperdense.  2. Focal hyperdensity which is more linear distribution within the  mesial left temporal lobe. This could potentially represent some  hyperdense clot within a left M2 vessel versus an additional small focus  of intra-axial hemorrhage. This is different in density in comparison  with the adjacent hemorrhage in the left basal ganglia. CT angiography  may be helpful for further characterization.  3. Diffuse atrophy and small vessel disease. Evidence of previous  infarcts involving the right frontal lobe as well as the right temporal  and parietal lobe in the MCA distribution. Remote right basal ganglia  and left thalamic lacunar infarcts are also present.     This report was finalized on 08/31/2023 18:03 by Dr. Martinez Gonzales MD.            I have reviewed the patient's current medications.     Assessment/Plan    Assessment  Active Hospital Problems    Diagnosis     **AMS (altered mental status)     COVID-19 virus detected     Dementia     Hyperlipidemia     Brain bleed     Benign essential HTN     DM2 (diabetes mellitus, type 2)        Treatment Plan  COVID-19 detected, patient is saturating well on room air, to continue follow-up of oxygen saturations, currently on zinc and vitamin C  Brain bleed residual effect of recent hemorrhagic stroke patient off Plavix, seen by neurosurgery and recommends to continue conservative treatment no intervention needed at this point  To rule out stroke, CT perfusion scan of the head is negative for major occlusion of the large blood vessel, to follow the MRI of the brain, holding any aspirin or Plavix as patient has recent hemorrhagic stroke  Encephalopathy possibly related to underlying cognitive dysfunctions with possible dementia and recent hemorrhagic stroke, in addition to recent COVID action  Diabetes mellitus on insulin regimen for appropriate control of the blood sugar  Hypertension, for permissive control of the blood pressure till we rule out stroke on labetalol as needed  Feeding patient has not passed swallowing test, speech therapy following, on IV fluids    Medical Decision Making  Number and Complexity of problems: High complexity  Differential Diagnosis: Rule out stroke    Conditions and Status             MDM Data  External documents reviewed: No  Cardiac tracing (EKG, telemetry) interpretation: No  Radiology interpretation: Yes  Labs reviewed: Yes  Any tests that were considered but not ordered: Yes      Decision rules/scores evaluated (example [id number], [place], etc): No     Discussed with: With granddaughter     Care Planning  Shared decision making: With granddaughter  Code status and discussions: With granddaughter    Disposition  Social Determinants of Health that impact treatment or disposition: Recent diagnosis of COVID to follow oxygen saturation and to  follow-up of MRI of the brain to rule out stroke and follow-up of neurology consult and follow-up of improvement of the cognitive status  I expect the patient to be discharged to skilled nursing facility in 2 to 3 days days.     Electronically signed by Guanako Mock MD, 09/01/23, 09:31 CDT.

## 2023-09-01 NOTE — THERAPY EVALUATION
Acute Care - Speech Language Pathology   Swallow Initial Evaluation [facility]     Patient Name: Bert Pereira  : 1945  MRN: [id number]  Today's Date: 2023               Admit Date: 2023    SPEECH-LANGUAGE PATHOLOGY EVALUATION - SWALLOW  Subjective: The patient was seen on this date for a Clinical Swallow evaluation.  Patient was poorly arousable. Although able to awaken to complete the evaluation with physical and verbal cues.   Significant history: Patient is admitted with Covid and recent brain bleed. Patient medical history includes dementia, HTN, DM, TIA, right sided weakness, falls, and hyperlipidemia.  Objective: Textures given included thin liquid, puree consistency, and regular consistency.  Assessment: Difficulties were noted with thin liquid, puree consistency, and regular consistency.  Observations:  Delayed swallow initiation and transit noted, although no opvert s/s aspiration with any consistency provided.   SLP Findings:  Patient presents with suspected pharyngeal dysphagia, without esophageal component.   Recommendations: Diet Textures: thin liquid, regular consistency food.  Medications should be taken whole with puree. May have water and ice between meals after oral care, under staff or family supervision and with the recommended strategies for safe swallowing.   Recommended Strategies: Upright for PO, small bites and sips, may use straw, alternate liquids and solids, and supervision with all PO. Oral care before breakfast, after all meals and PRN.  Other Recommended Evaluations: Re-evaluation at bedside    Dysphagia therapy is recommended. Rationale: diet tolerance in the setting of AMS.    Lara Morrison, SLP 2023 13:27 CDT      Visit Dx:     ICD-10-CM ICD-9-CM   1. Altered mental status, unspecified altered mental status type  R41.82 780.97   2. Impaired mobility [Z74.09 (ICD-10-CM)]  Z74.09 799.89   3. 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 No pertinent surgical history.    SLP Recommendation and Plan  SLP Swallowing Diagnosis: mild, suspected pharyngeal dysphagia (09/01/23 1211)  SLP Diet Recommendation: regular textures, thin liquids (09/01/23 1211)  Recommended Precautions and Strategies: upright posture during/after eating, small bites of food and sips of liquid, alternate between small bites of food and sips of liquid, general aspiration precautions, assist with feeding (09/01/23 1211)  SLP Rec. for Method of Medication Administration: meds whole, with puree (09/01/23 1211)     Monitor for Signs of Aspiration: yes, notify SLP if any concerns (09/01/23 1211)  Recommended Diagnostics: reassess via clinical swallow evaluation (09/01/23 1211)  Swallow Criteria for Skilled Therapeutic Interventions Met: demonstrates skilled criteria (09/01/23 1211)  Anticipated Discharge Disposition (SLP): skilled nursing facility (09/01/23 1211)  Rehab Potential/Prognosis, Swallowing: good, to achieve stated therapy goals (09/01/23 1211)  Therapy Frequency (Swallow): PRN (09/01/23 1211)  Predicted Duration Therapy Intervention (Days): 1 week (09/01/23 1211)  Oral Care Recommendations: Oral Care BID/PRN  (09/01/23 1211)                                      Oral Care Recommendations: Oral Care BID/PRN (09/01/23 1211)    Plan of Care Reviewed With: patient, grandchild(javon), caregiver  Progress: improving      SWALLOW EVALUATION (last 72 hours)       SLP Adult Swallow Evaluation       Row Name 09/01/23 1211                   Rehab Evaluation    Document Type evaluation  -MD        Subjective Information no complaints  -MD        Patient Observations lethargic  -MD        Patient/Family/Caregiver Comments/Observations grand daughter present  -MD        Patient Effort good  -MD        Symptoms Noted During/After Treatment none  -MD           General Information    Patient Profile Reviewed yes  -MD        Pertinent History Of Current Problem Patient admitted with Covid. Patient has had a recent brain bleed. History of dementia, HTN, DM, TIA, right sided hemiplegia, and hyperlipidemia.  -MD        Current Method of Nutrition NPO  -MD        Precautions/Limitations, Vision WFL with corrective lenses  -MD        Precautions/Limitations, Hearing WFL  -MD        Prior Level of Function-Communication unknown  -MD        Prior Level of Function-Swallowing no diet consistency restrictions  -MD        Plans/Goals Discussed with family;patient  -MD        Barriers to Rehab previous functional deficit  -MD        Patient's Goals for Discharge patient did not state  -MD           Pain Scale: Numbers Pre/Post-Treatment    Pretreatment Pain Rating 0/10 - no pain  -MD        Posttreatment Pain Rating 0/10 - no pain  -MD           Oral Motor Structure and Function    Dentition Assessment natural, present and adequate  -MD        Secretion Management WNL/OSVALDO  -MD        Mucosal Quality moist, healthy  -MD        Gag Response OSVALDO  -MD        Volitional Swallow OSVALDO  -MD           Oral Musculature and Cranial Nerve Assessment    Oral Motor General Assessment ROOSEVELTL  -MD           General Eating/Swallowing Observations    Respiratory Support  Currently in Use room air  -MD        Eating/Swallowing Skills fed by SLP  -MD        Positioning During Eating upright in bed  -MD        Utensils Used straw;spoon  -MD        Consistencies Trialed regular textures;pureed;thin liquids  -MD           Respiratory    Respiratory Status WFL;room air  -MD           Clinical Swallow Eval    Oral Prep Phase WFL  -MD        Oral Transit impaired  -MD        Pharyngeal Phase suspected pharyngeal impairment  -MD        Esophageal Phase unremarkable  -MD        Clinical Swallow Evaluation Summary see note  -MD           Oral Transit Concerns    Oral Transit Concerns delayed initiation of bolus transit  -MD        Delayed Intiation of Bolus Transit all consistencies  -MD        Oral Transit Concerns, Comment altered level of consciousness  -MD           Pharyngeal Phase Concerns    Pharyngeal Phase Concerns multiple swallows  -MD        Multiple Swallows all consistencies  -MD           SLP Evaluation Clinical Impression    SLP Swallowing Diagnosis mild;suspected pharyngeal dysphagia  -MD        Functional Impact risk of aspiration/pneumonia;risk of malnutrition;risk of dehydration  -MD        Rehab Potential/Prognosis, Swallowing good, to achieve stated therapy goals  -MD        Swallow Criteria for Skilled Therapeutic Interventions Met demonstrates skilled criteria  -MD           Recommendations    Therapy Frequency (Swallow) PRN  -MD        Predicted Duration Therapy Intervention (Days) 1 week  -MD        SLP Diet Recommendation regular textures;thin liquids  -MD        Recommended Diagnostics reassess via clinical swallow evaluation  -MD        Recommended Precautions and Strategies upright posture during/after eating;small bites of food and sips of liquid;alternate between small bites of food and sips of liquid;general aspiration precautions;assist with feeding  -MD        Oral Care Recommendations Oral Care BID/PRN  -MD        SLP Rec. for Method of Medication  Administration meds whole;with puree  -MD        Monitor for Signs of Aspiration yes;notify SLP if any concerns  -MD        Anticipated Discharge Disposition (SLP) skilled nursing facility  -MD           Swallow Goals (SLP)    Swallow LTGs Patient will demonstrate functional swallow for  -MD        Swallow STGs diet tolerance goal selection (SLP);swallow compensatory strategies goal selection (SLP)  -MD        Diet Tolerance Goal Selection (SLP) Patient will tolerate trials of  -MD        Swallow Compensatory Strategies Goal Selection (SLP) swallow compensatory strategies, SLP goal 1  -MD           (LTG) Patient will demonstrate functional swallow for    Diet Texture (Demonstrate functional swallow) regular textures  -MD        Liquid viscosity (Demonstrate functional swallow) thin liquids  -MD        [place] (Demonstrate functional swallow) independently (over 90% accuracy)  -MD        Time Frame (Demonstrate functional swallow) 1 week  -MD        Barriers (Demonstrate functional swallow) previous functional deficits  -MD        Progress/Outcomes (Demonstrate functional swallow) new goal  -MD           (STG) Patient will tolerate trials of    Consistencies Trialed (Tolerate trials) regular textures;thin liquids  -MD        Desired Outcome (Tolerate trials) without signs/symptoms of aspiration;with use of compensatory strategies (see comments)  -MD        [place] (Tolerate trials) independently (over 90% accuracy)  -MD        Time Frame (Tolerate trials) 1 week  -MD        Progress/Outcomes (Tolerate trials) new goal  -MD           (STG) Swallow Compensatory Strategies Goal 1 (SLP)    Activity (Swallow Compensatory Strategies/Techniques Goal 1, SLP) aspiration precautions;effortful swallow  -MD        [place]/Accuracy (Swallow Compensatory Strategies/Techniques Goal 1, SLP) independently (over 90% accuracy)  -MD        Time Frame (Swallow Compensatory Strategies/Techniques Goal 1, SLP) 1 week  -MD         Barriers (Swallow Compensatory Strategies/Techniques Goal 1, SLP) previous functional deficits  -MD        Progress/Outcomes (Swallow Compensatory Strategies/Techniques Goal 1, SLP) new goal  -MD                  User Key  (r) = Recorded By, (t) = Taken By, (c) = Cosigned By      Initials Name Effective Dates    MD Morrison Lara NAFISA, SLP 06/21/22 -                     EDUCATION  The patient has been educated in the following areas:   Dysphagia (Swallowing Impairment).        SLP GOALS       Row Name 09/01/23 1211             (LTG) Patient will demonstrate functional swallow for    Diet Texture (Demonstrate functional swallow) regular textures  -MD      Liquid viscosity (Demonstrate functional swallow) thin liquids  -MD      [place] (Demonstrate functional swallow) independently (over 90% accuracy)  -MD      Time Frame (Demonstrate functional swallow) 1 week  -MD      Barriers (Demonstrate functional swallow) previous functional deficits  -MD      Progress/Outcomes (Demonstrate functional swallow) new goal  -MD         (STG) Patient will tolerate trials of    Consistencies Trialed (Tolerate trials) regular textures;thin liquids  -MD      Desired Outcome (Tolerate trials) without signs/symptoms of aspiration;with use of compensatory strategies (see comments)  -MD      [place] (Tolerate trials) independently (over 90% accuracy)  -MD      Time Frame (Tolerate trials) 1 week  -MD      Progress/Outcomes (Tolerate trials) new goal  -MD         (STG) Swallow Compensatory Strategies Goal 1 (SLP)    Activity (Swallow Compensatory Strategies/Techniques Goal 1, SLP) aspiration precautions;effortful swallow  -MD      [place]/Accuracy (Swallow Compensatory Strategies/Techniques Goal 1, SLP) independently (over 90% accuracy)  -MD      Time Frame (Swallow Compensatory Strategies/Techniques Goal 1, SLP) 1 week  -MD      Barriers (Swallow Compensatory Strategies/Techniques Goal 1, SLP) previous functional deficits  -MD       Progress/Outcomes (Swallow Compensatory Strategies/Techniques Goal 1, SLP) new goal  -MD                User Key  (r) = Recorded By, (t) = Taken By, (c) = Cosigned By      Initials Name Provider Type    Lara Ellsworth, SLP Speech and Language Pathologist                       Time Calculation:    Time Calculation- SLP       Row Name 09/01/23 1326             Time Calculation- SLP    SLP Start Time 1211  -MD      SLP Stop Time 1326  -MD      SLP Time Calculation (min) 75 min  -MD      SLP Received On 09/01/23  -MD      SLP Goal Re-Cert Due Date 09/11/23  -MD         Untimed Charges    85179-HS Eval Oral Pharyng Swallow Minutes 75  -MD         Total Minutes    Untimed Charges Total Minutes 75  -MD       Total Minutes 75  -MD                User Key  (r) = Recorded By, (t) = Taken By, (c) = Cosigned By      Initials Name Provider Type    Lara Ellsworth, SLP Speech and Language Pathologist                    Therapy Charges for Today       Code Description Service Date Service Provider Modifiers Qty    [id number] HC ST EVAL ORAL PHARYNG SWALLOW 5 9/1/2023 Lara Morrison, SLP GN 1                 RADHA Redd  9/1/2023

## 2023-09-01 NOTE — CASE MANAGEMENT/SOCIAL WORK
Discharge Planning Assessment  [facility]     Patient Name: Bert Pereira  MRN: [id number]  Today's Date: 9/1/2023    Admit Date: 8/31/2023        Discharge Needs Assessment       Row Name 09/01/23 1415       Living Environment    People in Home facility resident    Name(s) of People in Home Pt was admitted from [facility]    Current Living Arrangements residential facility    Potentially Unsafe Housing Conditions none    Primary Care Provided by other (see comments)    Provides Primary Care For no one    Family Caregiver if Needed grandchild(javon), adult    Quality of Family Relationships helpful;involved    Able to Return to Prior Arrangements other (see comments)       Resource/Environmental Concerns    Resource/Environmental Concerns none    Transportation Concerns none       Food Insecurity    Within the past 12 months, you worried that your food would run out before you got the money to buy more. Never true    Within the past 12 months, the food you bought just didn't last and you didn't have money to get more. Never true       Transition Planning    Patient/Family Anticipates Transition to long-term care facility;inpatient rehabilitation facility    Patient/Family Anticipated Services at Transition skilled nursing;rehabilitation services    Transportation Anticipated health plan transportation       Discharge Needs Assessment    Readmission Within the Last 30 Days no previous admission in last 30 days    Current Outpatient/Agency/Support Group skilled nursing facility    Equipment Currently Used at Home other (see comments)    Concerns to be Addressed adjustment to diagnosis/illness;discharge planning    Anticipated Changes Related to Illness inability to care for self    Equipment Needed After Discharge other (see comments)    Outpatient/Agency/Support Group Needs skilled nursing facility    Discharge Facility/Level of Care Needs nursing facility, skilled                   Discharge Plan       Row Name  09/01/23 1415       Plan    Plan Comments Pt is from [place]. Message left with pt galen (Carmen) to find out if plan is for return to [place]. Await return call.                  Continued Care and Services - Admitted Since 8/31/2023    Coordination has not been started for this encounter.          Demographic Summary    No documentation.                  Functional Status    No documentation.                  Psychosocial    No documentation.                  Abuse/Neglect    No documentation.                  Legal    No documentation.                  Substance Abuse    No documentation.                  Patient Forms    No documentation.                     BANDAR Escalante

## 2023-09-01 NOTE — PLAN OF CARE
Goal Outcome Evaluation:  Plan of Care Reviewed With: patient, family           Outcome Evaluation: No acute events overnight. AOx1, to self. NIH q4h score 9-14. Inc of urine, utilized brief. Resp panel positive for covid. Maintained droplet/contact precautions. PIV infusing NS at 75mL/hr. Failed swallow screen in ER, pt NPO until SLP eval. VSS, though BP elevated. Did not meet parameters requiring PRN Labetalol. Pending MRI and neurosurgery consult. Granddaughter at bedside.

## 2023-09-01 NOTE — PLAN OF CARE
Problem: Adult Inpatient Plan of Care  Goal: Plan of Care Review  Recent Flowsheet Documentation  Taken 9/1/2023 1005 by Pardeep Wells, PT  Plan of Care Reviewed With:   patient   grandchild(javon)  Outcome Evaluation: PT IE complete.  Pt is mod/max A for bed mobility.  Mod A sit<>stand.  Ambulated 6ft max A x1 with RW.  Pt leans right in sitting, standing, and ambulating.  PT to see for gait safety, balance, and strengthening.  Recommend SNF at [place].  Thank you for referral.   Goal Outcome Evaluation:  Plan of Care Reviewed With: patient, bhupinder(javon)           Outcome Evaluation: PT IE complete.  Pt is mod/max A for bed mobility.  Mod A sit<>stand.  Ambulated 6ft max A x1 with RW.  Pt leans right in sitting, standing, and ambulating.  PT to see for gait safety, balance, and strengthening.  Recommend SNF at [place].  Thank you for referral.      Anticipated Discharge Disposition (PT): skilled nursing facility

## 2023-09-01 NOTE — PLAN OF CARE
Goal Outcome Evaluation:  Plan of Care Reviewed With: caregiver        Progress: no change  Outcome Evaluation: Ntn assessment completed. Oral intake insufficient at this time. Stroke protocol, HgbA1c 8.0%. Pt admitted from SNF pt has dementia education for diabetes not appropriate at this time. [facility] Boost Glucose Control BID. Cont to follow for plan of care.

## 2023-09-01 NOTE — H&P
[facility] Medicine Services  HISTORY AND PHYSICAL    Date of Admission: 8/31/2023  Primary Care Physician: Provider, No Known    Subjective   Primary Historian: grand daughter, ER provider    Chief Complaint: AMS    History of Present Illness  Patient unable to give any reliable ROS or provide any history.  He is confused.  Somewhat of a confusing story in general.  Patient presents to the ER today from [facility].  He apparently had recently been admitted to [place] for a reported brain bleed with a platelet count of 20.  He had been on aspirin and Plavix which were discontinued.  He presents here today from the nursing facility for an acute change in mental status.  Apparently around 3 PM he became confused and combative.  Granddaughter states he was fine prior to that.  She denies any history of severe cognitive issues.  She says he is a little forgetful at times.  Nursing home has documented history of dementia history and he is on Namenda.  Granddaughter seems to be unaware of this diagnosis.  Other history includes hypertension, diabetes, TIA.  In the ER he was afebrile without a white count elevation.  Urinalysis without signs of infection.  Chest x-ray clear.  Head CT showed what appears to be subacute intra-axial hemorrhage within the left basal ganglia some mass effect and effacement of the frontal horn.  Some other question of possible M2 vessel clot versus intra-axial hemorrhage.  Per ER provider call was made to Dr. Anna of neurosurgery who stated the bleed was not new and had no acute interventional needs.  Call was also made from ER provider to Dr. Helms of neurology due to concern for stroke.  Reported recommendation to admit and get an MRI.  Was called for admission and by time I saw the patient he was already up in 348.  Granddaughter at bedside.  He is awake and interactive.  He denies any complaints.  Denies headache, chest pain,  shortness of breath, weakness.  Says he feels fine.  He is not combative but patient is only oriented to himself and does not appear to be reliable historian.  He is slow to respond to questions and commands but is following commands.  Good  bilaterally.  Moving legs bilaterally.        Review of Systems   Otherwise complete ROS reviewed and negative except as mentioned in the HPI.    Past Medical History:   Past Medical History:   Diagnosis Date    Constipation, unspecified     Diabetes     Essential (primary) hypertension 08/28/2023    Hemiplegia, unspecified affecting right dominant side 08/28/2023    History of falling     Hyperlipidemia, unspecified 08/28/2023    Nontraumatic intracerebral hemorrhage, unspecified 08/28/2023    Personal history of transient ischemic attack (TIA), and cerebral infarction without residual deficits     Unspecified dementia, unspecified severity, without behavioral disturbance, psychotic disturbance, mood disturbance, and anxiety      Past Surgical History:History reviewed. No pertinent surgical history.  Social History:  reports that he has never smoked. He has never used smokeless tobacco. He reports that he does not drink alcohol and does not use drugs.    Family History: htn    Allergies:  No Known Allergies    Medications:  Prior to Admission medications    Medication Sig Start Date End Date Taking? Authorizing Provider   amLODIPine (NORVASC) 10 MG tablet Take 1 tablet by mouth Daily.    Eunice Denny MD   atorvastatin (LIPITOR) 20 MG tablet Take 1 tablet by mouth Daily.    Eunice Denny MD   docusate sodium (COLACE) 100 MG capsule Take 1 capsule by mouth 2 (Two) Times a Day.    Eunice Denny MD   insulin glargine (LANTUS, SEMGLEE) 100 UNIT/ML injection Inject 10 Units under the skin into the appropriate area as directed 2 (Two) Times a Day.    Eunice Denny MD   losartan (COZAAR) 50 MG tablet Take 2 tablets by mouth Daily.    Provider  "MD Eunice   memantine (NAMENDA XR) 7 MG capsule sustained-release 24 hr extended release capsule Take 1 capsule by mouth Daily.    Provider, MD Eunice     I have utilized all available immediate resources to obtain, update, or review the patient's current medications (including all prescriptions, over-the-counter products, herbals, cannabis/cannabidiol products, and vitamin/mineral/dietary (nutritional) supplements).    Objective     Vital Signs: /94 (BP Location: Right arm, Patient Position: Lying)   Pulse 112   Temp 99.5 °F (37.5 °C) (Oral)   Resp 18   Ht 175.3 cm (69\")   Wt 75.1 kg (165 lb 9.6 oz)   SpO2 96%   BMI 24.45 kg/m²   Physical Exam   GEN: Awake, alert, interactive, in NAD  HEENT:  PERRLA, EOMI, Anicteric, Trachea midline  Lungs:  no wheezing/rales/rhonchi  Heart: increased rate, regular rhythm, +S1/s2, no rub  ABD: soft, nt/nd, +BS, no guarding/rebound  Extremities: atraumatic, no cyanosis, no edema  Skin: no rashes or petechaie  Neuro: AAOx1, muscle strength equal in all extremities, no obvious focal deficits, gait not tested  Psych: flat affect        Results Reviewed:  Lab Results (last 24 hours)       Procedure Component Value Units Date/Time    Respiratory Panel PCR w/COVID-19(SARS-CoV-2) SERA/BRUNO/WHIT/PAD/COR/MAD/NASIM In-House, NP Swab in UTM/VTM, 3-4 HR TAT - Swab, Nasopharynx [[id number]]  (Abnormal) Collected: 08/31/23 2055    Specimen: Swab from Nasopharynx Updated: 08/31/23 2203     ADENOVIRUS, PCR Not Detected     Coronavirus 229E Not Detected     Coronavirus HKU1 Not Detected     Coronavirus NL63 Not Detected     Coronavirus OC43 Not Detected     COVID19 Detected     Human Metapneumovirus Not Detected     Human Rhinovirus/Enterovirus Not Detected     Influenza A PCR Not Detected     Influenza B PCR Not Detected     Parainfluenza Virus 1 Not Detected     Parainfluenza Virus 2 Not Detected     Parainfluenza Virus 3 Not Detected     Parainfluenza Virus 4 Not Detected     " RSV, PCR Not Detected     Bordetella pertussis pcr Not Detected     Bordetella parapertussis PCR Not Detected     Chlamydophila pneumoniae PCR Not Detected     Mycoplasma pneumo by PCR Not Detected    Narrative:      In the setting of a positive respiratory panel with a viral infection PLUS a negative procalcitonin without other underlying concern for bacterial infection, consider observing off antibiotics or discontinuation of antibiotics and continue supportive care. If the respiratory panel is positive for atypical bacterial infection (Bordetella pertussis, Chlamydophila pneumoniae, or Mycoplasma pneumoniae), consider antibiotic de-escalation to target atypical bacterial infection.    Comprehensive Metabolic Panel [[id number]]  (Abnormal) Collected: 08/31/23 1928    Specimen: Blood Updated: 08/31/23 2007     Glucose 286 mg/dL      BUN 22 mg/dL      Creatinine 0.82 mg/dL      Sodium 134 mmol/L      Potassium 4.6 mmol/L      Comment: Slight hemolysis detected by analyzer. Results may be affected.        Chloride 96 mmol/L      CO2 26.0 mmol/L      Calcium 9.9 mg/dL      Total Protein 7.3 g/dL      Albumin 4.5 g/dL      ALT (SGPT) 19 U/L      AST (SGOT) 18 U/L      Comment: Slight hemolysis detected by analyzer. Results may be affected.        Alkaline Phosphatase 87 U/L      Total Bilirubin 0.5 mg/dL      Globulin 2.8 gm/dL      A/G Ratio 1.6 g/dL      BUN/Creatinine Ratio 26.8     Anion Gap 12.0 mmol/L      eGFR 88.8 mL/min/1.73     Narrative:      GFR Normal >60  Chronic Kidney Disease <60  Kidney Failure <15    The GFR formula is only valid for adults with stable renal function between ages 18 and 70.    Single High Sensitivity Troponin T [[id number]]  (Abnormal) Collected: 08/31/23 1928    Specimen: Blood Updated: 08/31/23 2000     HS Troponin T 34 ng/L     Narrative:      High Sensitive Troponin T Reference Range:  <10.0 ng/L- Negative Female for AMI  <15.0 ng/L- Negative Male for AMI  >=10 - Abnormal Female  indicating possible myocardial injury.  >=15 - Abnormal Male indicating possible myocardial injury.   Clinicians would have to utilize clinical acumen, EKG, Troponin, and serial changes to determine if it is an Acute Myocardial Infarction or myocardial injury due to an underlying chronic condition.         [place] Draw [[id number]] Collected: 08/31/23 1815    Specimen: Blood Updated: 08/31/23 1930    Narrative:      The following orders were created for panel order [place] Draw.  Procedure                               Abnormality         Status                     ---------                               -----------         ------                     Green Top (Gel)[[id number]]                                  Final result               Lavender Top[[id number]]                                     Final result               Red Top[[id number]]                                          Final result               Light Blue Top[[id number]]                                   Final result                 Please view results for these tests on the individual orders.    Green Top (Gel) [[id number]] Collected: 08/31/23 1815    Specimen: Blood Updated: 08/31/23 1930     Extra Tube Hold for add-ons.     Comment: Auto resulted.       Lavender Top [[id number]] Collected: 08/31/23 1815    Specimen: Blood Updated: 08/31/23 1930     Extra Tube hold for add-on     Comment: Auto resulted       Red Top [[id number]] Collected: 08/31/23 1815    Specimen: Blood Updated: 08/31/23 1930     Extra Tube Hold for add-ons.     Comment: Auto resulted.       Light Blue Top [[id number]] Collected: 08/31/23 1823    Specimen: Blood Updated: 08/31/23 1930     Extra Tube Hold for add-ons.     Comment: Auto resulted       Urinalysis With Culture If Indicated - Urine, Clean Catch [[id number]]  (Abnormal) Collected: 08/31/23 1841    Specimen: Urine, Clean Catch Updated: 08/31/23 1902     Color, UA Yellow     Appearance, UA Clear     pH, UA 6.5     Specific Gravity,  UA >1.030     Glucose, UA >=1000 mg/dL (3+)     Ketones, UA Negative     Bilirubin, UA Negative     Blood, UA Negative     Protein, UA Negative     Leuk Esterase, UA Negative     Nitrite, UA Negative     Urobilinogen, UA 0.2 E.U./dL    Narrative:      In absence of clinical symptoms, the presence of pyuria, bacteria, and/or nitrites on the urinalysis result does not correlate with infection.  Urine microscopic not indicated.    aPTT [[id number]]  (Normal) Collected: 08/31/23 1823    Specimen: Blood Updated: 08/31/23 1842     PTT 30.1 seconds     Protime-INR [[id number]]  (Normal) Collected: 08/31/23 1823    Specimen: Blood Updated: 08/31/23 1842     Protime 12.7 Seconds      INR 0.94    CBC & Differential [[id number]]  (Abnormal) Collected: 08/31/23 1815    Specimen: Blood Updated: 08/31/23 1830    Narrative:      The following orders were created for panel order CBC & Differential.  Procedure                               Abnormality         Status                     ---------                               -----------         ------                     CBC Auto Differential[[id number]]        Abnormal            Final result                 Please view results for these tests on the individual orders.    CBC Auto Differential [[id number]]  (Abnormal) Collected: 08/31/23 1815    Specimen: Blood Updated: 08/31/23 1830     WBC 9.93 10*3/mm3      RBC 5.06 10*6/mm3      Hemoglobin 15.0 g/dL      Hematocrit 46.5 %      MCV 91.9 fL      MCH 29.6 pg      MCHC 32.3 g/dL      RDW 13.1 %      RDW-SD 44.0 fl      MPV 12.1 fL      Platelets 170 10*3/mm3      Neutrophil % 82.5 %      Lymphocyte % 8.3 %      Monocyte % 8.5 %      Eosinophil % 0.1 %      Basophil % 0.3 %      Immature Grans % 0.3 %      Neutrophils, Absolute 8.20 10*3/mm3      Lymphocytes, Absolute 0.82 10*3/mm3      Monocytes, Absolute 0.84 10*3/mm3      Eosinophils, Absolute 0.01 10*3/mm3      Basophils, Absolute 0.03 10*3/mm3      Immature Grans, Absolute 0.03  10*3/mm3      nRBC 0.0 /100 WBC           Imaging Results (Last 24 Hours)       Procedure Component Value Units Date/Time    XR Chest 1 View [[id number]] Collected: 08/31/23 1857     Updated: 08/31/23 1900    Narrative:      EXAMINATION: Chest 1 view 08/31/2023     HISTORY: Acute stroke protocol.     FINDINGS: Upright frontal projection of the chest demonstrates mild  cardiomegaly. The thoracic aorta and great vessels are ectatic. Lungs  are fully expanded and clear. No consolidative pneumonia or effusion.       Impression:      1. No acute disease.  This report was finalized on 08/31/2023 18:57 by Dr. Martinez Gonzales MD.    CT CEREBRAL PERFUSION WITH & WITHOUT CONTRAST [[id number]] Collected: 08/31/23 1838     Updated: 08/31/23 1845    Narrative:      Indication: Confusion     Exam:      1. Perfusion CT is performed to acquire images tracking the temporal  course of iodinated contrast material passing through the cerebral  circulation. Perfusion parameters, such as cerebral blood flow (CBF),  cerebral blood volume (CBV), mean transit time (MTT), etc. are  calculated by [employer] with additional provided perfusion maps and  estimated stroke volumes.  2. Automated exposure control (AEC) protocols are utilized on the  scanner to ensure dose lowered technique.      Comparison: Noncontrast CT brain and brain angiogram dated 8/31/2023  5:42 PM CDT     Findings:     Abnormal perfusion exam, with size and distribution of the perfusion  abnormality described below.     Distribution: Perfusion abnormality involves the left basal ganglia and  a site of recent intra-axial hemorrhage..     Tmax >6.0 seconds volume: 11 ml     CBF < 30% volume: 0 ml     Mismatch volume: 11 ml      Mismatch ratio: Infinite       Impression:      Impression:  1. There is noted to be delayed transit time of contrast in the left  basal ganglia at the site of a recent intra-axial hemorrhage. There is  associated edema and some mass effect with subtle  effacement of the  frontal horn of the left lateral ventricle at this site related to the  recent hemorrhage likely accounting for the delayed perfusion. I do not  see evidence of core infarct or significant penumbra to suggest large  vessel occlusion/acute ischemia.  This report was finalized on 08/31/2023 18:42 by Dr. Martinez Gonzales MD.    CT Angiogram Head w AI Analysis of LVO [[id number]] Collected: 08/31/23 1818     Updated: 08/31/23 1841    Narrative:      EXAMINATION: CT angiogram of the neck and head with contrast with AI  analysis for LVO. 08/31/2023     HISTORY: Acute stroke suspected. Neurologic deficit.     DOSE: 414 mGycm. All CT scans are performed using dose optimization  techniques as appropriate to the performed exam and including at least  one of the following: Automated exposure control, adjustment of the mA  and/or kV according to size, and the use of the iterative reconstruction  technique..     FINDINGS: Multiple contiguous axial images are obtained from the aortic  arch through the vertex following intravenous contrast infusion with  reformatted images obtained in the sagittal and coronal projections from  the original data set. MIPS are also obtained.     There is atheromatous calcification of the thoracic aorta and proximal  great vessels without evidence of aneurysm or dissection. No rate  limiting stenosis at the origin of the great vessels. There are  emphysematous changes of the lungs. The thyroid is homogeneous in  density without evidence of enlargement or nodularity. Level of the true  and false cords is unremarkable. No pathologically enlarged cervical  chain or posterior triangle lymphadenopathy is present.     The origin of the vertebral arteries are widely patent. The left  vertebral artery is dominant. There is mild calcific plaquing involving  the intracranial segment of the left vertebral artery without rate  limiting stenosis. Both vertebral arteries are otherwise widely  patent  to the basilar artery.     There is scattered plaquing involving the right common carotid artery  without evidence of rate limiting stenosis. There is calcific plaquing  involving the carotid bifurcation with involvement of the carotid bulb  and proximal ICA. This results in an approximate 40% cross-sectional  stenosis within the proximal right ICA. The right ICA is tortuous but  otherwise widely patent to the skull base.     Examination of the left common carotid artery demonstrates scattered  plaquing without rate limiting stenosis. There is calcific plaquing  involving the carotid bulb and proximal left ICA with a less than 25%  cross-sectional stenosis at the level of the left carotid bulb. The left  ICA is tortuous.     Examination of the [place] including AI analysis for LVO is  also performed including MIPS.     The left vertebral artery is dominant. Mild calcific plaquing involving  the intracranial segment of the left vertebral artery does not result in  significant stenosis. Both vertebral arteries are patent to the basilar  artery. The posterior inferior cerebellar arteries are normal in  appearance. There is mild stenosis involving the mid segment of the  basilar artery. The superior cerebellar artery are widely patent. There  is a persistent fetal origin of the right posterior cerebral artery. The  left posterior cerebral artery emanates from the distal basilar artery  with a patent posterior communicating artery also present and  contributing to the posterior circulation.     Both distal extracranial ICAs are widely patent. There is calcific  plaquing involving the petrous segment of the right ICA without rate  limiting stenosis. There is calcific plaquing involving the cavernous  segment of both ICAs with mild associated stenosis. There is also  calcific plaquing involving the distal cavernous and proximal  supraclinoid segment of both ICAs with mild associated bilateral  stenosis.  Both vessels are patent to the terminus. The anterior and  middle cerebral arteries are normal in appearance with no evidence of  intraluminal thrombus or berry aneurysm. No focal steno-occlusive  disease. Previously described focus of linear density within the mesial  left temporal lobe appears to be separable from the left middle cerebral  artery branches       Impression:      1.. Calcific plaquing involving the intracranial segment of the left  vertebral artery without evidence of rate limiting stenosis. There is a  small short segment stenosis within the basilar artery above the  anterior inferior cerebellar artery origin. The superior cerebellar  arteries are widely patent. The right posterior cerebral artery  represents a persistent fetal origin from the anterior circulation. The  left PCA emanates from the basilar artery but also has a contribution  from the left posterior communicating artery.  2. Calcific plaquing involving the petrous segment of the right ICA as  well as the cavernous and proximal supraclinoid segment of both ICAs  with mild associated stenosis. Both ICAs are patent to the terminus. The  anterior and middle cerebral arteries are normal in appearance with no  intraluminal thrombus or berry aneurysm demonstrated. No focal stenosis  is appreciated within the anterior or middle cerebral arteries.  3. 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  This is slightly more prominent on the right than on the left with an  approximate 40% cross-sectional stenosis within the proximal right ICA  and a less than 25% cross-sectional stenosis at the level of the left  carotid bulb. The more distal extracranial ICAs are tortuous but  otherwise patent.  This report was finalized on 08/31/2023 18:37 by Dr. Martinez Gonzales MD.    CT Angiogram Neck [[id number]] Collected: 08/31/23 1818     Updated: 08/31/23 1841    Narrative:      EXAMINATION: CT angiogram of the neck and head with  contrast with AI  analysis for LVO. 08/31/2023     HISTORY: Acute stroke suspected. Neurologic deficit.     DOSE: 414 mGycm. All CT scans are performed using dose optimization  techniques as appropriate to the performed exam and including at least  one of the following: Automated exposure control, adjustment of the mA  and/or kV according to size, and the use of the iterative reconstruction  technique..     FINDINGS: Multiple contiguous axial images are obtained from the aortic  arch through the vertex following intravenous contrast infusion with  reformatted images obtained in the sagittal and coronal projections from  the original data set. MIPS are also obtained.     There is atheromatous calcification of the thoracic aorta and proximal  great vessels without evidence of aneurysm or dissection. No rate  limiting stenosis at the origin of the great vessels. There are  emphysematous changes of the lungs. The thyroid is homogeneous in  density without evidence of enlargement or nodularity. Level of the true  and false cords is unremarkable. No pathologically enlarged cervical  chain or posterior triangle lymphadenopathy is present.     The origin of the vertebral arteries are widely patent. The left  vertebral artery is dominant. There is mild calcific plaquing involving  the intracranial segment of the left vertebral artery without rate  limiting stenosis. Both vertebral arteries are otherwise widely patent  to the basilar artery.     There is scattered plaquing involving the right common carotid artery  without evidence of rate limiting stenosis. There is calcific plaquing  involving the carotid bifurcation with involvement of the carotid bulb  and proximal ICA. This results in an approximate 40% cross-sectional  stenosis within the proximal right ICA. The right ICA is tortuous but  otherwise widely patent to the skull base.     Examination of the left common carotid artery demonstrates scattered  plaquing without  rate limiting stenosis. There is calcific plaquing  involving the carotid bulb and proximal left ICA with a less than 25%  cross-sectional stenosis at the level of the left carotid bulb. The left  ICA is tortuous.     Examination of the [place] including AI analysis for LVO is  also performed including MIPS.     The left vertebral artery is dominant. Mild calcific plaquing involving  the intracranial segment of the left vertebral artery does not result in  significant stenosis. Both vertebral arteries are patent to the basilar  artery. The posterior inferior cerebellar arteries are normal in  appearance. There is mild stenosis involving the mid segment of the  basilar artery. The superior cerebellar artery are widely patent. There  is a persistent fetal origin of the right posterior cerebral artery. The  left posterior cerebral artery emanates from the distal basilar artery  with a patent posterior communicating artery also present and  contributing to the posterior circulation.     Both distal extracranial ICAs are widely patent. There is calcific  plaquing involving the petrous segment of the right ICA without rate  limiting stenosis. There is calcific plaquing involving the cavernous  segment of both ICAs with mild associated stenosis. There is also  calcific plaquing involving the distal cavernous and proximal  supraclinoid segment of both ICAs with mild associated bilateral  stenosis. Both vessels are patent to the terminus. The anterior and  middle cerebral arteries are normal in appearance with no evidence of  intraluminal thrombus or berry aneurysm. No focal steno-occlusive  disease. Previously described focus of linear density within the mesial  left temporal lobe appears to be separable from the left middle cerebral  artery branches       Impression:      1.. Calcific plaquing involving the intracranial segment of the left  vertebral artery without evidence of rate limiting stenosis. There is  a  small short segment stenosis within the basilar artery above the  anterior inferior cerebellar artery origin. The superior cerebellar  arteries are widely patent. The right posterior cerebral artery  represents a persistent fetal origin from the anterior circulation. The  left PCA emanates from the basilar artery but also has a contribution  from the left posterior communicating artery.  2. Calcific plaquing involving the petrous segment of the right ICA as  well as the cavernous and proximal supraclinoid segment of both ICAs  with mild associated stenosis. Both ICAs are patent to the terminus. The  anterior and middle cerebral arteries are normal in appearance with no  intraluminal thrombus or berry aneurysm demonstrated. No focal stenosis  is appreciated within the anterior or middle cerebral arteries.  3. 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  This is slightly more prominent on the right than on the left with an  approximate 40% cross-sectional stenosis within the proximal right ICA  and a less than 25% cross-sectional stenosis at the level of the left  carotid bulb. The more distal extracranial ICAs are tortuous but  otherwise patent.  This report was finalized on 08/31/2023 18:37 by Dr. Martinez Gonzales MD.    CT Head Without Contrast Stroke Protocol [[id number]] Collected: 08/31/23 1751     Updated: 08/31/23 1807    Narrative:      CT HEAD WO CONTRAST STROKE PROTOCOL- 8/31/2023 5:40 PM CDT     HISTORY: Neuro deficit, acute, stroke suspected     COMPARISON: None      DLP: 748 mGy cm. All CT scans are performed using dose optimization  techniques as appropriate to the performed exam and including at least  one of the following: Automated exposure control, adjustment of the mA  and/or kV according to size, and the use of the iterative reconstruction  technique.     TECHNIQUE: Serial axial tomographic images of the brain were obtained  without the use of intravenous contrast.       FINDINGS:   The midline structures are nondisplaced. There is diffuse atrophy of the  brain with prominence of the subarachnoid spaces and ventricular  enlargement. There is evidence of a previous posterior division right  MCA territory infarct with encephalomalacia within the right temporal  and parietal lobe. A remote infarct involving the right basal ganglia  and left thalamus are also present.     There is a subtle focus of increased density within the left basal  ganglia. This has ill-defined margins measuring approximately 2.6 x 2.0  cm in size with subtle effacement of the frontal horn of the left  lateral ventricle. I suspect this may represent a focus of subacute  hemorrhage. There is evidence of intraventricular hemorrhage with blood  layering dependently within the posterior horn of both lateral  ventricles. No evidence of acute intra-axial or extra-axial hemorrhage.  There is also evidence of a previous right frontal lobe infarct or  posttraumatic change with encephalomalacia.     Noted on image 13 of series 5 and image 12 of series 9 is focal more  linear hyperdensity within the mesial left temporal lobe. This is in  close proximity to the M2 segments of the left MCA and could potentially  represent some clot within one of these vessels. CT angiography may be  helpful for better delineation.     The orbits are unremarkable.     The visualized paranasal sinuses and mastoid air cells are normally  aerated.       Impression:      1.. Subtle hyperdensity within the left basal ganglia with some mass  effect and mild effacement of the frontal horn of the left lateral  ventricle. Radiographically I would favor that this represents a focus  of subacute intra-axial hemorrhage. There is evidence of blood layering  dependently within the posterior horn of both lateral ventricles. No  evidence of acute appearing intra or extra-axial hemorrhage although the  blood layering within the posterior horn of the left  lateral ventricle  is relatively hyperdense.  2. Focal hyperdensity which is more linear distribution within the  mesial left temporal lobe. This could potentially represent some  hyperdense clot within a left M2 vessel versus an additional small focus  of intra-axial hemorrhage. This is different in density in comparison  with the adjacent hemorrhage in the left basal ganglia. CT angiography  may be helpful for further characterization.  3. Diffuse atrophy and small vessel disease. Evidence of previous  infarcts involving the right frontal lobe as well as the right temporal  and parietal lobe in the MCA distribution. Remote right basal ganglia  and left thalamic lacunar infarcts are also present.     This report was finalized on 08/31/2023 18:03 by Dr. Martinez Gonzales MD.          I have personally reviewed and interpreted the radiology studies and ECG obtained at time of admission.     Assessment / Plan   Assessment:   Active Hospital Problems    Diagnosis     **AMS (altered mental status)     COVID-19 virus detected     Dementia     Hyperlipidemia     Brain bleed     Benign essential HTN     DM2 (diabetes mellitus, type 2)        Treatment Plan  The patient will be admitted to my service here at [facility].     #1 altered mental status -potentially multifactorial.  Reported history of dementia per nursing facility.  Patient is on Namenda.  He also ended up coming positive for COVID after we admitted him from the ER.  Recent brain bleed noted on CT and felt to be subacute.  ER provider concern for stroke with concern for an M2 occlusion as well.  Neurology was consulted along with neurosurgery by ER provider.  We been asked admit monitor overnight and get an MRI.  Patient already had CTA of the head and neck in the ER as well as perfusion scan.  Will await MRI before completing stroke work-up with 2D echo if necessary.  We will follow-up on neuro and neurosurgery eval's.  Supportive care.    #2  COVID-19 -no specific treatment needed.  Was patiently hospitalized in [place] and currently in a nursing facility.  On room air.  Clear chest x-ray.  Supportive care.  Zinc and vitamin C.  Incentive spirometry.    #3 hyperlipidemia -continue statin    #4 hypertension -we will have as needed IV in.  We will allow some mild hypertension tonight and will MRI.  Resume BP meds in the morning pending speech eval and MRI results.    #5 brain bleed -subacute.  Monitor neuro status.    #6 DM2 -NPO.  Sliding scale insulin.  Hypoglycemia protocol.    #7 dementia -per report.  Continue Namenda when able to take p.o.    Medical Decision Making  Number and Complexity of problems: 2 acute, 1 subacute, multiple chronic  Differential Diagnosis: as above    Conditions and Status        New to me, alert and interactive, appears stable     MDM Data  External documents reviewed: none  Cardiac tracing (EKG, telemetry) interpretation: sinus with pvc, no acute st/t changes  Radiology interpretation: reports reviewed  Labs reviewed: as above  Any tests that were considered but not ordered: none     Decision rules/scores evaluated (example [id number], [place], etc): none     Discussed with: patient, grand daughter, Dr. Antunez     Care Planning  Shared decision making: Patient unable to participate in care at this time. Family apprised of current labs, vitals, imaging and treatment plan.  They are agreeable with proceeding with plans as discussed.      Code status and discussions: full code    Disposition  Social Determinants of Health that impact treatment or disposition: from facility  Estimated length of stay is less than 2 days.     I confirmed that the patient's advanced care plan is present, code status is documented, and a surrogate decision maker is listed in the patient's medical record.     The patient's surrogate decision maker is his grand daughter Carmen. Wife is disabled.     The patient was seen and examined by me on  8/31/23 at 10:05 PM.    Electronically signed by Zoran Sloan DO, 08/31/23, 23:32 CDT.

## 2023-09-01 NOTE — PROGRESS NOTES
Chief Complaint: Confusion  HPI   78-year-old man with past medical history of hypertension diabetes mellitus and recurrent ischemic stroke, has recently about 10 days ago was diagnosed with hemorrhagic stroke treated conservatively and patient sent to rehab, patient before the hemorrhagic stroke with independent but after that there is significant decline of the cognitive function, in rehab it was noted that the patient has significant decline in his alertness was not able to cooperate with physical therapy and this was dramatic change so they sent him to our hospital, in the ER CT scan have shown that there is hemologic changes and neurosurgery was consulted to consider this residual effect of the recent hemorrhagic stroke and no active bleed is there and recommends continuing conservative management, we need to rule out stroke, initial CT perfusion scan of the neck and CT angiogram of the head and neck is not impressive of major occlusion of large blood vessel with some element of mild ICA stenosis bilateral.  Testing for COVID in the ER came positive but patient is saturating well in room air.  I encountered the patient today in the presence of his granddaughter, patient is confused not answering the question but open his eyes spontaneously, granddaughter updated and all her questions answered           Review of Systems   Cannot be obtained as patient is not communicating     Objective    Temp:  [97.3 °F (36.3 °C)-99.5 °F (37.5 °C)] 98.7 °F (37.1 °C)  Heart Rate:  [] 91  Resp:  [16-18] 16  BP: (139-160)/() 141/78  Physical Exam  General patient is confused obtunded not responding to the questions  Neck no elevated jugular venous pressure, no cervical adenopathy  Chest no tachypnea saturating well on room air chest clear  air entry with no rhonchi or crepitations  Heart regular rate and rhythm with no murmurs or gallop  Abdomen soft with no tenderness no rigidity no organomegaly        Results  Review:  I have reviewed the labs, radiology results, and diagnostic studies.     Laboratory Data:          Results from last 7 days   Lab Units 09/01/23  0454 08/31/23  1815 08/30/23  1030   WBC 10*3/mm3 8.62 9.93 9.43   HEMOGLOBIN g/dL 15.4 15.0 15.1   HEMATOCRIT % 49.2 46.5 47.6   PLATELETS 10*3/mm3 149 170 199                Results from last 7 days   Lab Units 09/01/23  0454 08/31/23  1928 08/30/23  1030   SODIUM mmol/L 133* 134* 139   POTASSIUM mmol/L 4.3 4.6 4.2   CHLORIDE mmol/L 98 96* 98   CO2 mmol/L 22.0 26.0 28.0   BUN mg/dL 18 22 24*   CREATININE mg/dL 0.77 0.82 0.81   CALCIUM mg/dL 9.9 9.9 10.5   BILIRUBIN mg/dL 0.5 0.5 0.5   ALK PHOS U/L 84 87 92   ALT (SGPT) U/L 16 19 17   AST (SGOT) U/L 18 18 14   GLUCOSE mg/dL 185* 286* 268*         Culture Data:   No results found for: BLOODCX, URINECX, WOUNDCX, MRSACX, RESPCX, STOOLCX     Radiology Data:   Imaging Results (Last 24 Hours)         Procedure Component Value Units Date/Time     XR Chest 1 View [[id number]] Collected: 08/31/23 1857       Updated: 08/31/23 1900     Narrative:       EXAMINATION: Chest 1 view 08/31/2023     HISTORY: Acute stroke protocol.     FINDINGS: Upright frontal projection of the chest demonstrates mild  cardiomegaly. The thoracic aorta and great vessels are ectatic. Lungs  are fully expanded and clear. No consolidative pneumonia or effusion.        Impression:       1. No acute disease.  This report was finalized on 08/31/2023 18:57 by Dr. Martinez Gonzales MD.     CT CEREBRAL PERFUSION WITH & WITHOUT CONTRAST [[id number]] Collected: 08/31/23 1838       Updated: 08/31/23 1845     Narrative:       Indication: Confusion     Exam:      1. Perfusion CT is performed to acquire images tracking the temporal  course of iodinated contrast material passing through the cerebral  circulation. Perfusion parameters, such as cerebral blood flow (CBF),  cerebral blood volume (CBV), mean transit time (MTT), etc. are  calculated by [employer] with  additional provided perfusion maps and  estimated stroke volumes.  2. Automated exposure control (AEC) protocols are utilized on the  scanner to ensure dose lowered technique.      Comparison: Noncontrast CT brain and brain angiogram dated 8/31/2023  5:42 PM CDT     Findings:     Abnormal perfusion exam, with size and distribution of the perfusion  abnormality described below.     Distribution: Perfusion abnormality involves the left basal ganglia and  a site of recent intra-axial hemorrhage..     Tmax >6.0 seconds volume: 11 ml     CBF < 30% volume: 0 ml     Mismatch volume: 11 ml      Mismatch ratio: Infinite        Impression:       Impression:  1. There is noted to be delayed transit time of contrast in the left  basal ganglia at the site of a recent intra-axial hemorrhage. There is  associated edema and some mass effect with subtle effacement of the  frontal horn of the left lateral ventricle at this site related to the  recent hemorrhage likely accounting for the delayed perfusion. I do not  see evidence of core infarct or significant penumbra to suggest large  vessel occlusion/acute ischemia.  This report was finalized on 08/31/2023 18:42 by Dr. Martinez Gonzales MD.     CT Angiogram Head w AI Analysis of LVO [[id number]] Collected: 08/31/23 1818       Updated: 08/31/23 1841     Narrative:       EXAMINATION: CT angiogram of the neck and head with contrast with AI  analysis for LVO. 08/31/2023     HISTORY: Acute stroke suspected. Neurologic deficit.     DOSE: 414 mGycm. All CT scans are performed using dose optimization  techniques as appropriate to the performed exam and including at least  one of the following: Automated exposure control, adjustment of the mA  and/or kV according to size, and the use of the iterative reconstruction  technique..     FINDINGS: Multiple contiguous axial images are obtained from the aortic  arch through the vertex following intravenous contrast infusion with  reformatted images  obtained in the sagittal and coronal projections from  the original data set. MIPS are also obtained.     There is atheromatous calcification of the thoracic aorta and proximal  great vessels without evidence of aneurysm or dissection. No rate  limiting stenosis at the origin of the great vessels. There are  emphysematous changes of the lungs. The thyroid is homogeneous in  density without evidence of enlargement or nodularity. Level of the true  and false cords is unremarkable. No pathologically enlarged cervical  chain or posterior triangle lymphadenopathy is present.     The origin of the vertebral arteries are widely patent. The left  vertebral artery is dominant. There is mild calcific plaquing involving  the intracranial segment of the left vertebral artery without rate  limiting stenosis. Both vertebral arteries are otherwise widely patent  to the basilar artery.     There is scattered plaquing involving the right common carotid artery  without evidence of rate limiting stenosis. There is calcific plaquing  involving the carotid bifurcation with involvement of the carotid bulb  and proximal ICA. This results in an approximate 40% cross-sectional  stenosis within the proximal right ICA. The right ICA is tortuous but  otherwise widely patent to the skull base.     Examination of the left common carotid artery demonstrates scattered  plaquing without rate limiting stenosis. There is calcific plaquing  involving the carotid bulb and proximal left ICA with a less than 25%  cross-sectional stenosis at the level of the left carotid bulb. The left  ICA is tortuous.     Examination of the [place] including AI analysis for LVO is  also performed including MIPS.     The left vertebral artery is dominant. Mild calcific plaquing involving  the intracranial segment of the left vertebral artery does not result in  significant stenosis. Both vertebral arteries are patent to the basilar  artery. The posterior  inferior cerebellar arteries are normal in  appearance. There is mild stenosis involving the mid segment of the  basilar artery. The superior cerebellar artery are widely patent. There  is a persistent fetal origin of the right posterior cerebral artery. The  left posterior cerebral artery emanates from the distal basilar artery  with a patent posterior communicating artery also present and  contributing to the posterior circulation.     Both distal extracranial ICAs are widely patent. There is calcific  plaquing involving the petrous segment of the right ICA without rate  limiting stenosis. There is calcific plaquing involving the cavernous  segment of both ICAs with mild associated stenosis. There is also  calcific plaquing involving the distal cavernous and proximal  supraclinoid segment of both ICAs with mild associated bilateral  stenosis. Both vessels are patent to the terminus. The anterior and  middle cerebral arteries are normal in appearance with no evidence of  intraluminal thrombus or berry aneurysm. No focal steno-occlusive  disease. Previously described focus of linear density within the mesial  left temporal lobe appears to be separable from the left middle cerebral  artery branches        Impression:       1.. Calcific plaquing involving the intracranial segment of the left  vertebral artery without evidence of rate limiting stenosis. There is a  small short segment stenosis within the basilar artery above the  anterior inferior cerebellar artery origin. The superior cerebellar  arteries are widely patent. The right posterior cerebral artery  represents a persistent fetal origin from the anterior circulation. The  left PCA emanates from the basilar artery but also has a contribution  from the left posterior communicating artery.  2. Calcific plaquing involving the petrous segment of the right ICA as  well as the cavernous and proximal supraclinoid segment of both ICAs  with mild associated stenosis.  Both ICAs are patent to the terminus. The  anterior and middle cerebral arteries are normal in appearance with no  intraluminal thrombus or berry aneurysm demonstrated. No focal stenosis  is appreciated within the anterior or middle cerebral arteries.  3. 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  This is slightly more prominent on the right than on the left with an  approximate 40% cross-sectional stenosis within the proximal right ICA  and a less than 25% cross-sectional stenosis at the level of the left  carotid bulb. The more distal extracranial ICAs are tortuous but  otherwise patent.  This report was finalized on 08/31/2023 18:37 by Dr. Martinez Gonzales MD.     CT Angiogram Neck [[id number]] Collected: 08/31/23 1818       Updated: 08/31/23 1841     Narrative:       EXAMINATION: CT angiogram of the neck and head with contrast with AI  analysis for LVO. 08/31/2023     HISTORY: Acute stroke suspected. Neurologic deficit.     DOSE: 414 mGycm. All CT scans are performed using dose optimization  techniques as appropriate to the performed exam and including at least  one of the following: Automated exposure control, adjustment of the mA  and/or kV according to size, and the use of the iterative reconstruction  technique..     FINDINGS: Multiple contiguous axial images are obtained from the aortic  arch through the vertex following intravenous contrast infusion with  reformatted images obtained in the sagittal and coronal projections from  the original data set. MIPS are also obtained.     There is atheromatous calcification of the thoracic aorta and proximal  great vessels without evidence of aneurysm or dissection. No rate  limiting stenosis at the origin of the great vessels. There are  emphysematous changes of the lungs. The thyroid is homogeneous in  density without evidence of enlargement or nodularity. Level of the true  and false cords is unremarkable. No pathologically enlarged  cervical  chain or posterior triangle lymphadenopathy is present.     The origin of the vertebral arteries are widely patent. The left  vertebral artery is dominant. There is mild calcific plaquing involving  the intracranial segment of the left vertebral artery without rate  limiting stenosis. Both vertebral arteries are otherwise widely patent  to the basilar artery.     There is scattered plaquing involving the right common carotid artery  without evidence of rate limiting stenosis. There is calcific plaquing  involving the carotid bifurcation with involvement of the carotid bulb  and proximal ICA. This results in an approximate 40% cross-sectional  stenosis within the proximal right ICA. The right ICA is tortuous but  otherwise widely patent to the skull base.     Examination of the left common carotid artery demonstrates scattered  plaquing without rate limiting stenosis. There is calcific plaquing  involving the carotid bulb and proximal left ICA with a less than 25%  cross-sectional stenosis at the level of the left carotid bulb. The left  ICA is tortuous.     Examination of the [place] including AI analysis for LVO is  also performed including MIPS.     The left vertebral artery is dominant. Mild calcific plaquing involving  the intracranial segment of the left vertebral artery does not result in  significant stenosis. Both vertebral arteries are patent to the basilar  artery. The posterior inferior cerebellar arteries are normal in  appearance. There is mild stenosis involving the mid segment of the  basilar artery. The superior cerebellar artery are widely patent. There  is a persistent fetal origin of the right posterior cerebral artery. The  left posterior cerebral artery emanates from the distal basilar artery  with a patent posterior communicating artery also present and  contributing to the posterior circulation.     Both distal extracranial ICAs are widely patent. There is calcific  plaquing  involving the petrous segment of the right ICA without rate  limiting stenosis. There is calcific plaquing involving the cavernous  segment of both ICAs with mild associated stenosis. There is also  calcific plaquing involving the distal cavernous and proximal  supraclinoid segment of both ICAs with mild associated bilateral  stenosis. Both vessels are patent to the terminus. The anterior and  middle cerebral arteries are normal in appearance with no evidence of  intraluminal thrombus or berry aneurysm. No focal steno-occlusive  disease. Previously described focus of linear density within the mesial  left temporal lobe appears to be separable from the left middle cerebral  artery branches        Impression:       1.. Calcific plaquing involving the intracranial segment of the left  vertebral artery without evidence of rate limiting stenosis. There is a  small short segment stenosis within the basilar artery above the  anterior inferior cerebellar artery origin. The superior cerebellar  arteries are widely patent. The right posterior cerebral artery  represents a persistent fetal origin from the anterior circulation. The  left PCA emanates from the basilar artery but also has a contribution  from the left posterior communicating artery.  2. Calcific plaquing involving the petrous segment of the right ICA as  well as the cavernous and proximal supraclinoid segment of both ICAs  with mild associated stenosis. Both ICAs are patent to the terminus. The  anterior and middle cerebral arteries are normal in appearance with no  intraluminal thrombus or berry aneurysm demonstrated. No focal stenosis  is appreciated within the anterior or middle cerebral arteries.  3. 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  This is slightly more prominent on the right than on the left with an  approximate 40% cross-sectional stenosis within the proximal right ICA  and a less than 25% cross-sectional stenosis at the  level of the left  carotid bulb. The more distal extracranial ICAs are tortuous but  otherwise patent.  This report was finalized on 08/31/2023 18:37 by Dr. Martinez Gonzales MD.     CT Head Without Contrast Stroke Protocol [[id number]] Collected: 08/31/23 1751       Updated: 08/31/23 1807     Narrative:       CT HEAD WO CONTRAST STROKE PROTOCOL- 8/31/2023 5:40 PM CDT     HISTORY: Neuro deficit, acute, stroke suspected     COMPARISON: None      DLP: 748 mGy cm. All CT scans are performed using dose optimization  techniques as appropriate to the performed exam and including at least  one of the following: Automated exposure control, adjustment of the mA  and/or kV according to size, and the use of the iterative reconstruction  technique.     TECHNIQUE: Serial axial tomographic images of the brain were obtained  without the use of intravenous contrast.      FINDINGS:   The midline structures are nondisplaced. There is diffuse atrophy of the  brain with prominence of the subarachnoid spaces and ventricular  enlargement. There is evidence of a previous posterior division right  MCA territory infarct with encephalomalacia within the right temporal  and parietal lobe. A remote infarct involving the right basal ganglia  and left thalamus are also present.     There is a subtle focus of increased density within the left basal  ganglia. This has ill-defined margins measuring approximately 2.6 x 2.0  cm in size with subtle effacement of the frontal horn of the left  lateral ventricle. I suspect this may represent a focus of subacute  hemorrhage. There is evidence of intraventricular hemorrhage with blood  layering dependently within the posterior horn of both lateral  ventricles. No evidence of acute intra-axial or extra-axial hemorrhage.  There is also evidence of a previous right frontal lobe infarct or  posttraumatic change with encephalomalacia.     Noted on image 13 of series 5 and image 12 of series 9 is focal  more  linear hyperdensity within the mesial left temporal lobe. This is in  close proximity to the M2 segments of the left MCA and could potentially  represent some clot within one of these vessels. CT angiography may be  helpful for better delineation.     The orbits are unremarkable.     The visualized paranasal sinuses and mastoid air cells are normally  aerated.        Impression:       1.. Subtle hyperdensity within the left basal ganglia with some mass  effect and mild effacement of the frontal horn of the left lateral  ventricle. Radiographically I would favor that this represents a focus  of subacute intra-axial hemorrhage. There is evidence of blood layering  dependently within the posterior horn of both lateral ventricles. No  evidence of acute appearing intra or extra-axial hemorrhage although the  blood layering within the posterior horn of the left lateral ventricle  is relatively hyperdense.  2. Focal hyperdensity which is more linear distribution within the  mesial left temporal lobe. This could potentially represent some  hyperdense clot within a left M2 vessel versus an additional small focus  of intra-axial hemorrhage. This is different in density in comparison  with the adjacent hemorrhage in the left basal ganglia. CT angiography  may be helpful for further characterization.  3. Diffuse atrophy and small vessel disease. Evidence of previous  infarcts involving the right frontal lobe as well as the right temporal  and parietal lobe in the MCA distribution. Remote right basal ganglia  and left thalamic lacunar infarcts are also present.     This report was finalized on 08/31/2023 18:03 by Dr. Martinez Gonzales MD.                I have reviewed the patient's current medications.      Assessment/Plan   Assessment       Active Hospital Problems     Diagnosis      **AMS (altered mental status)      COVID-19 virus detected      Dementia      Hyperlipidemia      Brain bleed      Benign essential HTN       DM2 (diabetes mellitus, type 2)           Treatment Plan  COVID-19 detected, patient is saturating well on room air, to continue follow-up of oxygen saturations, currently on zinc and vitamin C  Brain bleed residual effect of recent hemorrhagic stroke patient off Plavix, seen by neurosurgery and recommends to continue conservative treatment no intervention needed at this point  To rule out stroke, CT perfusion scan of the head is negative for major occlusion of the large blood vessel, to follow the MRI of the brain, holding any aspirin or Plavix as patient has recent hemorrhagic stroke  Encephalopathy possibly related to underlying cognitive dysfunctions with possible dementia and recent hemorrhagic stroke, in addition to recent COVID action  Diabetes mellitus on insulin regimen for appropriate control of the blood sugar  Hypertension, for permissive control of the blood pressure till we rule out stroke on labetalol as needed  Feeding patient has not passed swallowing test, speech therapy following, on IV fluids     Medical Decision Making  Number and Complexity of problems: High complexity  Differential Diagnosis: Rule out stroke     Conditions and Status             MDM Data  External documents reviewed: No  Cardiac tracing (EKG, telemetry) interpretation: No  Radiology interpretation: Yes  Labs reviewed: Yes  Any tests that were considered but not ordered: Yes      Decision rules/scores evaluated (example [id number], [place], etc): No     Discussed with: With granddaughter     Care Planning  Shared decision making: With granddaughter  Code status and discussions: With granddaughter     Disposition  Social Determinants of Health that impact treatment or disposition: Recent diagnosis of COVID to follow oxygen saturation and to follow-up of MRI of the brain to rule out stroke and follow-up of neurology consult and follow-up of improvement of the cognitive status  I expect the patient to be discharged to skilled  nursing facility in 2 to 3 days days.      Electronically signed by Guanako Mock MD, 09/01/23, 09:31 CDT.

## 2023-09-01 NOTE — PLAN OF CARE
Goal Outcome Evaluation:      Pt alert and oriented x1. VSS. no c/o pain. WILLIS. PPP. SCDS  for VTE. . Tolerating Con carb diet. Passed swallow eval with speech.Skin intact. Voiding and stool incontance. Last BM 9/1/23. BS monitoring Ac/HS Isolation enhanced droplet D/c plansto be detmined by PT/Ot. . Call light within reach. Safety maintained.

## 2023-09-01 NOTE — CONSULTS
Reason for consult: Intracranial hemorrhage    Chief Complaint   Patient presents with    Altered Mental Status         Requesting provider: Dr. Zroan Sloan    HPI: This is an 78-year-old male gentleman who resides in nursing facility.  The patient had been admitted to a facility in [place] for a brain bleed with a platelet count of 20.  The patient had been on aspirin and Plavix which was discontinued.  Patient was transition to a nursing facility and apparently around 3 PM on August 31, 2023 the patient did become confused and combative.  The patient does have underlying dementia and he is on Namenda but according to family they felt like this was worsening normal for him.  The patient was brought into the emergency room and imaging did show what appears to be a fading intracranial hemorrhage in the left basal ganglia.  He did test positive for COVID.  Further imaging did not suggest that he is having an acute ischemic event.  In seeing the patient today he is not complaining of a headache.  He is minimally conversive.  His granddaughter is in the room with him.  He is able to tell me his granddaughters name but not that she is his granddaughter.  He is able to tell me his name but is disoriented to place and time.  He does follow some commands.    Review of Systems   Constitutional:  Positive for activity change. Negative for fever.   Musculoskeletal:  Negative for back pain.   Neurological:  Negative for dizziness, seizures and headaches.   Psychiatric/Behavioral:  Positive for behavioral problems and confusion.    All other systems reviewed and are negative.     Pertinent positives/negatives documented in HPI.  All other systems reviewed and negative.    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    Past Surgical History:  has no past surgical history on file.    Family History: family history is not on file.    Social History:  reports that he has never smoked. He has never used smokeless tobacco. He reports that he does not drink alcohol and does not use drugs.    Allergies: Patient has no known allergies.    Medications: Scheduled Meds:ascorbic acid, 500 mg, Oral, Daily  atorvastatin, 20 mg, Oral, Nightly  insulin lispro, 2-7 Units, Subcutaneous, 4x Daily AC & at Bedtime  labetalol, 5 mg, Intravenous, Once  memantine, 2.5 mg, Oral, Q12H  sodium chloride, 10 mL, Intravenous, Q12H  zinc sulfate, 220 mg, Oral, Daily      Continuous Infusions:sodium chloride, 75 mL/hr, Last Rate: 75 mL/hr (08/31/23 2333)      PRN Meds:.  acetaminophen **OR** acetaminophen    dextrose    dextrose    glucagon (human recombinant)    labetalol    ondansetron    sodium chloride    sodium chloride    sodium chloride     Objective:  General Appearance:  Comfortable, well-appearing, in no acute distress and not in pain.    Vital signs: (most recent): Blood pressure 141/78, pulse 91, temperature 98.7 °F (37.1 °C), temperature source Temporal, resp. rate 16, height 175.3 cm (69\"), weight 75.1 kg (165 lb 9.6 oz), SpO2 98 %.  Vital signs are normal.  No fever.    Output: Producing urine.    HEENT: Normal HEENT exam.    Lungs:  Normal effort and normal respiratory rate.  He is not in respiratory distress.    Heart: Normal rate.  Regular rhythm.    Chest: Symmetric chest wall expansion.   Extremities: Normal range of motion.    Skin:  Warm and dry.    Abdomen: Abdomen is soft and non-distended.  Bowel sounds are normal.   There " is no abdominal tenderness.     Pulses: Distal pulses are intact.      Neurologic Exam     Mental Status   Oriented to person.   Disoriented to place.   Disoriented to year.   Attention: decreased. Concentration: decreased.   Speech: speech is normal   Level of consciousness: alert  Abnormal comprehension.   Following some commands     Cranial Nerves     CN II   Visual fields full to confrontation.     CN III, IV, VI   Pupils are equal, round, and reactive to light.  Extraocular motions are normal.     CN V   Facial sensation intact.     CN VII   Facial expression full, symmetric.     CN VIII   CN VIII normal.     CN IX, X   CN IX normal.   CN X normal.     CN XI   CN XI normal.     CN XII   CN XII normal.     Motor Exam   Muscle bulk: normalMoving all extremities symmetrically and purposefully     Sensory Exam   Light touch normal.     Vital Signs  Temp:  [97.3 °F (36.3 °C)-99.5 °F (37.5 °C)] 98.7 °F (37.1 °C)  Heart Rate:  [] 91  Resp:  [16-18] 16  BP: (139-160)/() 141/78    Physical Exam  Constitutional:       General: Vital signs are normal.      Appearance: Normal appearance. He is well-developed.   HENT:      Head: Normocephalic.   Eyes:      General: Lids are normal.      Extraocular Movements: EOM normal.      Conjunctiva/sclera: Conjunctivae normal.      Pupils: Pupils are equal, round, and reactive to light.   Cardiovascular:      Rate and Rhythm: Normal rate and regular rhythm.      Pulses: Intact distal pulses.   Pulmonary:      Effort: Pulmonary effort is normal. No respiratory distress.      Breath sounds: Normal breath sounds.   Abdominal:      General: Bowel sounds are normal. There is no distension.      Palpations: Abdomen is soft.   Musculoskeletal:         General: Normal range of motion.      Cervical back: Normal range of motion.   Skin:     General: Skin is warm and dry.   Neurological:      Mental Status: He is alert. He is confused.      GCS: GCS eye subscore is 4. GCS verbal  subscore is 5. GCS motor subscore is 6.      Cranial Nerves: No cranial nerve deficit.      Sensory: No sensory deficit.      Deep Tendon Reflexes: Reflexes are normal and symmetric. Reflexes normal.   Psychiatric:         Mood and Affect: Affect is flat.         Speech: Speech normal.         Behavior: Behavior is slowed.         Cognition and Memory: Cognition is impaired. Memory is impaired.       Results Review:   I reviewed the patient's new clinical results.  I reviewed the patient's new imaging results and agree with the interpretation.  I reviewed the patient's other test results and agree with the interpretation          Lab Results (last 24 hours)       Procedure Component Value Units Date/Time    Hemoglobin A1c [[id number]]  (Abnormal) Collected: 08/31/23 1815    Specimen: Blood Updated: 09/01/23 0649     Hemoglobin A1C 8.00 %     Narrative:      Hemoglobin A1C Ranges:    Increased Risk for Diabetes  5.7% to 6.4%  Diabetes                     >= 6.5%  Diabetic Goal                < 7.0%    Comprehensive Metabolic Panel [[id number]]  (Abnormal) Collected: 09/01/23 0454    Specimen: Blood Updated: 09/01/23 0638     Glucose 185 mg/dL      BUN 18 mg/dL      Creatinine 0.77 mg/dL      Sodium 133 mmol/L      Potassium 4.3 mmol/L      Comment: Slight hemolysis detected by analyzer. Results may be affected.        Chloride 98 mmol/L      CO2 22.0 mmol/L      Calcium 9.9 mg/dL      Total Protein 7.2 g/dL      Albumin 4.0 g/dL      ALT (SGPT) 16 U/L      AST (SGOT) 18 U/L      Comment: Slight hemolysis detected by analyzer. Results may be affected.        Alkaline Phosphatase 84 U/L      Total Bilirubin 0.5 mg/dL      Globulin 3.2 gm/dL      A/G Ratio 1.3 g/dL      BUN/Creatinine Ratio 23.4     Anion Gap 13.0 mmol/L      eGFR 90.5 mL/min/1.73     Narrative:      GFR Normal >60  Chronic Kidney Disease <60  Kidney Failure <15    The GFR formula is only valid for adults with stable renal function between ages 18 and 70.     Lipid Panel [[id number]] Collected: 09/01/23 0454    Specimen: Blood Updated: 09/01/23 0637     Total Cholesterol 126 mg/dL      Triglycerides 59 mg/dL      HDL Cholesterol 47 mg/dL      LDL Cholesterol  66 mg/dL      VLDL Cholesterol 13 mg/dL      LDL/HDL Ratio 1.43    Narrative:      Cholesterol Reference Ranges  (U.S. Department of Health and Human Services ATP III Classifications)    Desirable          <200 mg/dL  Borderline High    200-239 mg/dL  High Risk          >240 mg/dL      Triglyceride Reference Ranges  (U.S. Department of Health and Human Services ATP III Classifications)    Normal           <150 mg/dL  Borderline High  150-199 mg/dL  High             200-499 mg/dL  Very High        >500 mg/dL    HDL Reference Ranges  (U.S. Department of Health and Human Services ATP III Classifications)    Low     <40 mg/dl (major risk factor for CHD)  High    >60 mg/dl ('negative' risk factor for CHD)        LDL Reference Ranges  (U.S. Department of Health and Human Services ATP III Classifications)    Optimal          <100 mg/dL  Near Optimal     100-129 mg/dL  Borderline High  130-159 mg/dL  High             160-189 mg/dL  Very High        >189 mg/dL    TSH [[id number]]  (Normal) Collected: 09/01/23 0454    Specimen: Blood Updated: 09/01/23 0637     TSH 2.920 uIU/mL     CBC (No Diff) [[id number]]  (Abnormal) Collected: 09/01/23 0454    Specimen: Blood Updated: 09/01/23 0609     WBC 8.62 10*3/mm3      RBC 5.28 10*6/mm3      Hemoglobin 15.4 g/dL      Hematocrit 49.2 %      MCV 93.2 fL      MCH 29.2 pg      MCHC 31.3 g/dL      RDW 13.0 %      RDW-SD 44.2 fl      MPV 11.8 fL      Platelets 149 10*3/mm3     POC Glucose Once [[id number]]  (Abnormal) Collected: 09/01/23 0537    Specimen: Blood Updated: 09/01/23 0548     Glucose 176 mg/dL      Comment: : [id number] [place] VivianMeter ID: [id number]       POC Glucose Once [[id number]]  (Abnormal) Collected: 08/31/23 2312    Specimen: Blood Updated: 08/31/23 2324     Glucose  191 mg/dL      Comment: : [id number] [place] ([place] AmandaMeter ID: [id number]       Respiratory Panel PCR w/COVID-19(SARS-CoV-2) SERA/BRUNO/WHIT/PAD/COR/MAD/NASIM In-House, NP Swab in UTM/VTM, 3-4 HR TAT - Swab, Nasopharynx [[id number]]  (Abnormal) Collected: 08/31/23 2055    Specimen: Swab from Nasopharynx Updated: 08/31/23 2203     ADENOVIRUS, PCR Not Detected     Coronavirus 229E Not Detected     Coronavirus HKU1 Not Detected     Coronavirus NL63 Not Detected     Coronavirus OC43 Not Detected     COVID19 Detected     Human Metapneumovirus Not Detected     Human Rhinovirus/Enterovirus Not Detected     Influenza A PCR Not Detected     Influenza B PCR Not Detected     Parainfluenza Virus 1 Not Detected     Parainfluenza Virus 2 Not Detected     Parainfluenza Virus 3 Not Detected     Parainfluenza Virus 4 Not Detected     RSV, PCR Not Detected     Bordetella pertussis pcr Not Detected     Bordetella parapertussis PCR Not Detected     Chlamydophila pneumoniae PCR Not Detected     Mycoplasma pneumo by PCR Not Detected    Narrative:      In the setting of a positive respiratory panel with a viral infection PLUS a negative procalcitonin without other underlying concern for bacterial infection, consider observing off antibiotics or discontinuation of antibiotics and continue supportive care. If the respiratory panel is positive for atypical bacterial infection (Bordetella pertussis, Chlamydophila pneumoniae, or Mycoplasma pneumoniae), consider antibiotic de-escalation to target atypical bacterial infection.    Comprehensive Metabolic Panel [[id number]]  (Abnormal) Collected: 08/31/23 1928    Specimen: Blood Updated: 08/31/23 2007     Glucose 286 mg/dL      BUN 22 mg/dL      Creatinine 0.82 mg/dL      Sodium 134 mmol/L      Potassium 4.6 mmol/L      Comment: Slight hemolysis detected by analyzer. Results may be affected.        Chloride 96 mmol/L      CO2 26.0 mmol/L      Calcium 9.9 mg/dL      Total Protein 7.3 g/dL       Albumin 4.5 g/dL      ALT (SGPT) 19 U/L      AST (SGOT) 18 U/L      Comment: Slight hemolysis detected by analyzer. Results may be affected.        Alkaline Phosphatase 87 U/L      Total Bilirubin 0.5 mg/dL      Globulin 2.8 gm/dL      A/G Ratio 1.6 g/dL      BUN/Creatinine Ratio 26.8     Anion Gap 12.0 mmol/L      eGFR 88.8 mL/min/1.73     Narrative:      GFR Normal >60  Chronic Kidney Disease <60  Kidney Failure <15    The GFR formula is only valid for adults with stable renal function between ages 18 and 70.    Single High Sensitivity Troponin T [[id number]]  (Abnormal) Collected: 08/31/23 1928    Specimen: Blood Updated: 08/31/23 2000     HS Troponin T 34 ng/L     Narrative:      High Sensitive Troponin T Reference Range:  <10.0 ng/L- Negative Female for AMI  <15.0 ng/L- Negative Male for AMI  >=10 - Abnormal Female indicating possible myocardial injury.  >=15 - Abnormal Male indicating possible myocardial injury.   Clinicians would have to utilize clinical acumen, EKG, Troponin, and serial changes to determine if it is an Acute Myocardial Infarction or myocardial injury due to an underlying chronic condition.         [facility] Draw [[id number]] Collected: 08/31/23 1815    Specimen: Blood Updated: 08/31/23 1930    Narrative:      The following orders were created for panel order [facility] Draw.  Procedure                               Abnormality         Status                     ---------                               -----------         ------                     Green Top (Gel)[[id number]]                                  Final result               Lavender Top[[id number]]                                     Final result               Red Top[[id number]]                                          Final result               Light Blue Top[[id number]]                                   Final result                 Please view results for these tests on the individual orders.    Green Top (Gel) [[id number]] Collected:  08/31/23 1815    Specimen: Blood Updated: 08/31/23 1930     Extra Tube Hold for add-ons.     Comment: Auto resulted.       Lavender Top [[id number]] Collected: 08/31/23 1815    Specimen: Blood Updated: 08/31/23 1930     Extra Tube hold for add-on     Comment: Auto resulted       Red Top [[id number]] Collected: 08/31/23 1815    Specimen: Blood Updated: 08/31/23 1930     Extra Tube Hold for add-ons.     Comment: Auto resulted.       Light Blue Top [[id number]] Collected: 08/31/23 1823    Specimen: Blood Updated: 08/31/23 1930     Extra Tube Hold for add-ons.     Comment: Auto resulted       Urinalysis With Culture If Indicated - Urine, Clean Catch [[id number]]  (Abnormal) Collected: 08/31/23 1841    Specimen: Urine, Clean Catch Updated: 08/31/23 1902     Color, UA Yellow     Appearance, UA Clear     pH, UA 6.5     Specific Gravity, UA >1.030     Glucose, UA >=1000 mg/dL (3+)     Ketones, UA Negative     Bilirubin, UA Negative     Blood, UA Negative     Protein, UA Negative     Leuk Esterase, UA Negative     Nitrite, UA Negative     Urobilinogen, UA 0.2 E.U./dL    Narrative:      In absence of clinical symptoms, the presence of pyuria, bacteria, and/or nitrites on the urinalysis result does not correlate with infection.  Urine microscopic not indicated.    aPTT [[id number]]  (Normal) Collected: 08/31/23 1823    Specimen: Blood Updated: 08/31/23 1842     PTT 30.1 seconds     Protime-INR [[id number]]  (Normal) Collected: 08/31/23 1823    Specimen: Blood Updated: 08/31/23 1842     Protime 12.7 Seconds      INR 0.94    CBC & Differential [[id number]]  (Abnormal) Collected: 08/31/23 1815    Specimen: Blood Updated: 08/31/23 1830    Narrative:      The following orders were created for panel order CBC & Differential.  Procedure                               Abnormality         Status                     ---------                               -----------         ------                     CBC Auto Differential[[id number]]         Abnormal            Final result                 Please view results for these tests on the individual orders.    CBC Auto Differential [[id number]]  (Abnormal) Collected: 08/31/23 1815    Specimen: Blood Updated: 08/31/23 1830     WBC 9.93 10*3/mm3      RBC 5.06 10*6/mm3      Hemoglobin 15.0 g/dL      Hematocrit 46.5 %      MCV 91.9 fL      MCH 29.6 pg      MCHC 32.3 g/dL      RDW 13.1 %      RDW-SD 44.0 fl      MPV 12.1 fL      Platelets 170 10*3/mm3      Neutrophil % 82.5 %      Lymphocyte % 8.3 %      Monocyte % 8.5 %      Eosinophil % 0.1 %      Basophil % 0.3 %      Immature Grans % 0.3 %      Neutrophils, Absolute 8.20 10*3/mm3      Lymphocytes, Absolute 0.82 10*3/mm3      Monocytes, Absolute 0.84 10*3/mm3      Eosinophils, Absolute 0.01 10*3/mm3      Basophils, Absolute 0.03 10*3/mm3      Immature Grans, Absolute 0.03 10*3/mm3      nRBC 0.0 /100 WBC           CT scan of the head shows small left basal ganglia hyperintensity that is consistent with patient's history of intracranial hemorrhage.  This does appear to be fading.  No mass effect or midline shift.        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  I think the patient's problem at this time is that he is suffering from COVID infection which is affecting his overall mental status and overall functional ability.  At this time there is nothing from a neurosurgical standpoint that needs to be addressed.  We can follow-up with the patient as an outpatient in regards to the hemorrhage.  Further medical management to come from the hospitalist.  Patient is okay to work with physical and Occupational Therapy.  Thank you for the opportunity to participate in this patient's plan of care      AMS (altered mental status)    COVID-19 virus detected    Dementia    Hyperlipidemia    Brain bleed    Benign essential HTN    DM2 (diabetes mellitus, type 2)      I discussed  the patient's findings and my recommendations with patient, family, and nursing staff    Bob Andrews, APRN  09/01/23  07:56 CDT    I spent 67 minutes caring for Bert on this date of service. This time includes time spent by me in the following activities: preparing for the visit, reviewing tests, obtaining and/or reviewing a separately obtained history, performing a medically appropriate examination and/or evaluation, counseling and educating the patient/family/caregiver, ordering medications, tests, or procedures, referring and communicating with other health care professionals, documenting information in the medical record, independently interpreting results and communicating that information with the patient/family/caregiver, and care coordination

## 2023-09-01 NOTE — THERAPY EVALUATION
Patient Name: Bert Pereira  : 1945    MRN: [id number]                              Today's Date: 2023       Admit Date: 2023    Visit Dx:     ICD-10-CM ICD-9-CM   1. Altered mental status, unspecified altered mental status type  R41.82 780.97   2. Impaired mobility [Z74.09 (ICD-10-CM)]  Z74.09 799.89     Patient Active Problem List   Diagnosis    AMS (altered mental status)    COVID-19 virus detected    Dementia    Hyperlipidemia    Brain bleed    Benign essential HTN    DM2 (diabetes mellitus, type 2)     Past Medical History:   Diagnosis Date    Constipation, unspecified     Diabetes     Essential (primary) hypertension 2023    Hemiplegia, unspecified affecting right dominant side 2023    History of falling     Hyperlipidemia, unspecified 2023    Nontraumatic intracerebral hemorrhage, unspecified 2023    Personal history of transient ischemic attack (TIA), and cerebral infarction without residual deficits     Unspecified dementia, unspecified severity, without behavioral disturbance, psychotic disturbance, mood disturbance, and anxiety      History reviewed. No pertinent surgical history.   General Information       Row Name 23 1005          Physical Therapy Time and Intention    Document Type evaluation  -     Mode of Treatment physical therapy;co-treatment  -       Row Name 23 1005          General Information    Patient Profile Reviewed yes  -     Prior Level of Function independent:  independent prior to NH  -     Existing Precautions/Restrictions fall  -     Barriers to Rehab ineffective coping  -       Row Name 23 1005          Living Environment    People in Home grandchild(javon)  -       Row Name 23 1005          Home Main Entrance    Number of Stairs, Main Entrance one  granddtr states getting a ramp  -       Row Name 23 1005          Stairs Within Home, Primary    Number of Stairs, Within Home, Primary none  -        Row Name 09/01/23 1005          Cognition    Orientation Status (Cognition) oriented to;person  -       Row Name 09/01/23 1005          Safety Issues, Functional Mobility    Safety Issues Affecting Function (Mobility) ability to follow commands;at risk behavior observed;insight into deficits/self-awareness;safety precautions follow-through/compliance;safety precaution awareness;problem-solving;positioning of assistive device  -     Impairments Affecting Function (Mobility) balance;cognition;coordination;endurance/activity tolerance;motor planning;postural/trunk control;strength;shortness of breath  -               User Key  (r) = Recorded By, (t) = Taken By, (c) = Cosigned By      Initials Name Provider Type     Pardeep Wells, PT Physical Therapist                   Mobility       Row Name 09/01/23 1005          Bed Mobility    Bed Mobility supine-sit;sit-supine  -     Supine-Sit [place] (Bed Mobility) maximum assist (25% patient effort)  -     Sit-Supine [place] (Bed Mobility) moderate assist (50% patient effort)  -     Assistive Device (Bed Mobility) bed rails  -       Row Name 09/01/23 1005          Sit-Stand Transfer    Sit-Stand [place] (Transfers) verbal cues;moderate assist (50% patient effort)  -       Row Name 09/01/23 1005          Gait/Stairs (Locomotion)    [place] Level (Gait) maximum assist (25% patient effort)  -     Assistive Device (Gait) walker, front-wheeled  -     Distance in Feet (Gait) 6  -     Deviations/Abnormal Patterns (Gait) nandini decreased;base of support, narrow;weight shifting decreased;stride length decreased;gait speed decreased  -     Bilateral Gait Deviations forward flexed posture;heel strike decreased  -               User Key  (r) = Recorded By, (t) = Taken By, (c) = Cosigned By      Initials Name Provider Type     Pardeep Wells, PT Physical Therapist                   Obj/Interventions       Row Name 09/01/23 1005           Range of Motion Comprehensive    General Range of Motion bilateral upper extremity ROM WFL;bilateral lower extremity ROM WFL  -Critical access hospital Name 09/01/23 1005          Strength Comprehensive (MMT)    General Manual Muscle Testing (MMT) Assessment lower extremity strength deficits identified  -LH       Row Name 09/01/23 1005          Balance    Balance Assessment sitting static balance  -     Static Sitting Balance moderate assist  -               User Key  (r) = Recorded By, (t) = Taken By, (c) = Cosigned By      Initials Name Provider Type     Pardeep Wells, PT Physical Therapist                   Goals/Plan       [facility] Name 09/01/23 1005          Bed Mobility Goal 1 (PT)    Activity/Assistive Device (Bed Mobility Goal 1, PT) bed mobility activities, all  -     [place] Level/Cues Needed (Bed Mobility Goal 1, PT) minimum assist (75% or more patient effort)  -     Time Frame (Bed Mobility Goal 1, PT) 10 days  -     Progress/Outcomes (Bed Mobility Goal 1, PT) new goal  -LH       Row Name 09/01/23 1005          Transfer Goal 1 (PT)    Activity/Assistive Device (Transfer Goal 1, PT) sit-to-stand/stand-to-sit  -     [place] Level/Cues Needed (Transfer Goal 1, PT) minimum assist (75% or more patient effort)  -     Time Frame (Transfer Goal 1, PT) 10 days  -     Progress/Outcome (Transfer Goal 1, PT) new goal  -LH       Row Name 09/01/23 1005          Gait Training Goal 1 (PT)    Activity/Assistive Device (Gait Training Goal 1, PT) gait (walking locomotion);assistive device use;decrease fall risk  -     [place] Level (Gait Training Goal 1, PT) minimum assist (75% or more patient effort)  -     Distance (Gait Training Goal 1, PT) 10ft  -     Time Frame (Gait Training Goal 1, PT) 10 days  -     Progress/Outcome (Gait Training Goal 1, PT) new goal  -Critical access hospital Name 09/01/23 1005          Therapy Assessment/Plan (PT)    Planned Therapy Interventions (PT) balance training;bed mobility  training;gait training;home exercise program;strengthening;postural re-education;patient/family education;neuromuscular re-education;transfer training  -               User Key  (r) = Recorded By, (t) = Taken By, (c) = Cosigned By      Initials Name Provider Type    Pardeep Jon, PT Physical Therapist                   Clinical Impression       Row Name 09/01/23 1005          Pain    Pretreatment Pain Rating 0/10 - no pain  -     Posttreatment Pain Rating 0/10 - no pain  -     Pain Intervention(s) Medication (See MAR)  -       Row Name 09/01/23 1005          Plan of Care Review    Plan of Care Reviewed With patient;grandchild(javon)  -     Outcome Evaluation PT IE complete.  Pt is mod/max A for bed mobility.  Mod A sit<>stand.  Ambulated 6ft max A x1 with RW.  Pt leans right in sitting, standing, and ambulating.  PT to see for gait safety, balance, and strengthening.  Recommend SNF at [place].  Thank you for referral.  -       Row Name 09/01/23 1005          Therapy Assessment/Plan (PT)    Patient/Family Therapy Goals Statement (PT) return home per dtr  -     Rehab Potential (PT) good, to achieve stated therapy goals  -     Criteria for Skilled Interventions Met (PT) yes;skilled treatment is necessary  [facility]     Therapy Frequency (PT) 2 times/day  -     Predicted Duration of Therapy Intervention (PT) until SC  -       Row Name 09/01/23 1005          Positioning and Restraints    Pre-Treatment Position in bed  -     Post Treatment Position bed  -     In Bed fowlers;call light within reach;with family/caregiver;side rails up x2  -               User Key  (r) = Recorded By, (t) = Taken By, (c) = Cosigned By      Initials Name Provider Type    Pardeep Jon, PT Physical Therapist                   Outcome Measures       Row Name 09/01/23 1044 09/01/23 0800       How much help from another person do you currently need...    Turning from your back to your side while in flat bed without using  bedrails? 2  - 3  -KP    Moving from lying on back to sitting on the side of a flat bed without bedrails? 2  - 3  -KP    Moving to and from a bed to a chair (including a wheelchair)? 2  - 3  -KP    Standing up from a chair using your arms (e.g., wheelchair, bedside chair)? 2  - 3  -KP    Climbing 3-5 steps with a railing? 1  - 2  -KP    To walk in hospital room? 2  - 2  -KP    AM-PAC 6 Clicks Score (PT) 11  - 16  -    Highest level of mobility 4 --> Transferred to chair/commode  - 5 --> Static standing  -      Row Name 09/01/23 1044          Functional Assessment    Outcome Measure Options AM-PAC 6 Clicks Basic Mobility (PT)  -               User Key  (r) = Recorded By, (t) = Taken By, (c) = Cosigned By      Initials Name Provider Type     Pardeep Wells, PT Physical Therapist    Gayle Beatty RN Registered Nurse                                 Physical Therapy Education       Title: PT OT SLP Therapies (Done)       Topic: Physical Therapy (Done)       Point: Mobility training (Done)       Learning Progress Summary             Patient Acceptance, E,D, DU,VU by  at 9/1/2023 1045    Comment: benefits of PT and POC, call for A OOB                         Point: Precautions (Done)       Learning Progress Summary             Patient Acceptance, E,D, DU,VU by  at 9/1/2023 1045    Comment: benefits of PT and POC, call for A OOB                                         User Key       Initials Effective Dates Name Provider Type Discipline     02/03/23 -  Pardeep Wells, PT Physical Therapist PT                  PT Recommendation and Plan  Planned Therapy Interventions (PT): balance training, bed mobility training, gait training, home exercise program, strengthening, postural re-education, patient/family education, neuromuscular re-education, transfer training  Plan of Care Reviewed With: patient, grandchild(javon)  Outcome Evaluation: PT IE complete.  Pt is mod/max A for bed mobility.  Mod A  sit<>stand.  Ambulated 6ft max A x1 with RW.  Pt leans right in sitting, standing, and ambulating.  PT to see for gait safety, balance, and strengthening.  Recommend SNF at [place].  Thank you for referral.     Time Calculation:         PT Charges       Row Name 09/01/23 1045             Time Calculation    Start Time 1005  -      Stop Time 1035  -      Time Calculation (min) 30 min  -      PT Received On 09/01/23  -      PT Goal Re-Cert Due Date 09/11/23  -         Untimed Charges    PT Eval/Re-eval Minutes 30  -LH         Total Minutes    Untimed Charges Total Minutes 30  -LH       Total Minutes 30  -LH                User Key  (r) = Recorded By, (t) = Taken By, (c) = Cosigned By      Initials Name Provider Type     Pardeep Wells, PT Physical Therapist                  Therapy Charges for Today       Code Description Service Date Service Provider Modifiers Qty    42227207767 HC PT EVAL HIGH COMPLEXITY 2 9/1/2023 Pardeep Wells PT GP 1            PT G-Codes  Outcome Measure Options: AM-PAC 6 Clicks Basic Mobility (PT)  AM-PAC 6 Clicks Score (PT): 11  PT Discharge Summary  Anticipated Discharge Disposition (PT): skilled nursing facility    Pardeep Wells PT  9/1/2023

## 2023-09-01 NOTE — PLAN OF CARE
Goal Outcome Evaluation:  Plan of Care Reviewed With: patient, grandchild(javon), caregiver        Progress: improving       SPEECH-LANGUAGE PATHOLOGY EVALUATION - SWALLOW  Subjective: The patient was seen on this date for a Clinical Swallow evaluation.  Patient was poorly arousable. Although able to awaken to complete the evaluation with physical and verbal cues.   Significant history: Patient is admitted with Covid and recent brain bleed. Patient medical history includes dementia, HTN, DM, TIA, right sided weakness, falls, and hyperlipidemia.  Objective: Textures given included thin liquid, puree consistency, and regular consistency.  Assessment: Difficulties were noted with thin liquid, puree consistency, and regular consistency.  Observations:  Delayed swallow initiation and transit noted, although no opvert s/s aspiration with any consistency provided.   SLP Findings:  Patient presents with suspected pharyngeal dysphagia, without esophageal component.   Recommendations: Diet Textures: thin liquid, regular consistency food.  Medications should be taken whole with puree. May have water and ice between meals after oral care, under staff or family supervision and with the recommended strategies for safe swallowing.   Recommended Strategies: Upright for PO, small bites and sips, may use straw, alternate liquids and solids, and supervision with all PO. Oral care before breakfast, after all meals and PRN.  Other Recommended Evaluations: Re-evaluation at bedside    Dysphagia therapy is recommended. Rationale: diet tolerance in the setting of AMS.    Lara Morrison, SLP 9/1/2023 13:25 CDT

## 2023-09-01 NOTE — ED NOTES
"Nursing report ED to floor  Bert Pereira  78 y.o.  male    HPI:   Chief Complaint   Patient presents with    Altered Mental Status       Admitting doctor:   Zoran Sloan DO    Consulting provider(s):  Consults       Date and Time Order Name Status Description    8/31/2023  5:35 PM Inpatient Neurology Consult Stroke      8/31/2023  5:35 PM Inpatient Neurology Consult Stroke               Admitting diagnosis:   The encounter diagnosis was Altered mental status, unspecified altered mental status type.    Code status:   Current Code Status       Date Active Code Status Order ID Comments User Context       Not on file            Allergies:   Patient has no known allergies.    Intake and Output  No intake or output data in the 24 hours ending 08/31/23 2056    Weight:       08/31/23 1847   Weight: 80.3 kg (177 lb)       Most recent vitals:   Vitals:    08/31/23 1723 08/31/23 1846 08/31/23 1847 08/31/23 1936   BP:  144/89  154/92   Pulse:  111  112   Resp:       Temp: 98.5 °F (36.9 °C)      SpO2:  96%  95%   Weight:   80.3 kg (177 lb)    Height:   175.3 cm (69\")      Oxygen Therapy: .    Active LDAs/IV Access:   Lines, Drains & Airways       Active LDAs       Name Placement date Placement time Site Days    Peripheral IV 08/31/23 Anterior;Right Forearm 08/31/23  --  Forearm  less than 1    Peripheral IV 08/31/23 Right Antecubital 08/31/23  --  Antecubital  less than 1                    Labs (abnormal labs have a star):   Labs Reviewed   COMPREHENSIVE METABOLIC PANEL - Abnormal; Notable for the following components:       Result Value    Glucose 286 (*)     Sodium 134 (*)     Chloride 96 (*)     BUN/Creatinine Ratio 26.8 (*)     All other components within normal limits    Narrative:     GFR Normal >60  Chronic Kidney Disease <60  Kidney Failure <15    The GFR formula is only valid for adults with stable renal function between ages 18 and 70.   SINGLE HSTROPONIN T - Abnormal; Notable for the following components:    HS " Troponin T 34 (*)     All other components within normal limits    Narrative:     High Sensitive Troponin T Reference Range:  <10.0 ng/L- Negative Female for AMI  <15.0 ng/L- Negative Male for AMI  >=10 - Abnormal Female indicating possible myocardial injury.  >=15 - Abnormal Male indicating possible myocardial injury.   Clinicians would have to utilize clinical acumen, EKG, Troponin, and serial changes to determine if it is an Acute Myocardial Infarction or myocardial injury due to an underlying chronic condition.        CBC WITH AUTO DIFFERENTIAL - Abnormal; Notable for the following components:    MPV 12.1 (*)     Neutrophil % 82.5 (*)     Lymphocyte % 8.3 (*)     Eosinophil % 0.1 (*)     Neutrophils, Absolute 8.20 (*)     All other components within normal limits   URINALYSIS W/ CULTURE IF INDICATED - Abnormal; Notable for the following components:    Specific Gravity, UA >1.030 (*)     Glucose, UA >=1000 mg/dL (3+) (*)     All other components within normal limits    Narrative:     In absence of clinical symptoms, the presence of pyuria, bacteria, and/or nitrites on the urinalysis result does not correlate with infection.  Urine microscopic not indicated.   PROTIME-INR - Normal   APTT - Normal   RESPIRATORY PANEL PCR W/ COVID-19 (SARS-COV-2) SERA/BRUNO/WHIT/PAD/COR/MAD/NASIM IN-HOUSE, NP SWAB IN [facility] [place], 3-4 HR TAT   RAINBOW DRAW    Narrative:     The following orders were created for panel order [facility] Draw.  Procedure                               Abnormality         Status                     ---------                               -----------         ------                     Green Top (Gel)[[id number]]                                  Final result               Lavender Top[[id number]]                                     Final result               Red Top[[id number]]                                          Final result               Light Blue Top[[id number]]                                   Final result                  Please view results for these tests on the individual orders.   POCT GLUCOSE FINGERSTICK   TYPE AND SCREEN   CBC AND DIFFERENTIAL    Narrative:     The following orders were created for panel order CBC & Differential.  Procedure                               Abnormality         Status                     ---------                               -----------         ------                     CBC Auto Differential[[id number]]        Abnormal            Final result                 Please view results for these tests on the individual orders.   GREEN TOP   LAVENDER TOP   RED TOP   LIGHT BLUE TOP       Meds given in ED:   Medications   sodium chloride 0.9 % flush 10 mL (has no administration in time range)   labetalol (NORMODYNE,TRANDATE) injection 5 mg (has no administration in time range)   iopamidol (ISOVUE-370) 76 % injection 125 mL (125 mL Intravenous Given 8/31/23 1800)           NIH Stroke Scale:  Interval: baseline    Isolation/Infection(s):  No active isolations   COVID (rule out)     COVID Testing  Collected .  Resulted .    Nursing report ED to floor:  Mental status: .  Ambulatory status: .  Precautions: .    ED nurse phone extentsion- ..

## 2023-09-01 NOTE — THERAPY EVALUATION
Patient Name: Bert Pereira  : 1945    MRN: [id number]                              Today's Date: 2023       Admit Date: 2023    Visit Dx:     ICD-10-CM ICD-9-CM   1. Altered mental status, unspecified altered mental status type  R41.82 780.97   2. Impaired mobility [Z74.09 (ICD-10-CM)]  Z74.09 799.89     Patient Active Problem List   Diagnosis    AMS (altered mental status)    COVID-19 virus detected    Dementia    Hyperlipidemia    Brain bleed    Benign essential HTN    DM2 (diabetes mellitus, type 2)     Past Medical History:   Diagnosis Date    Constipation, unspecified     Diabetes     Essential (primary) hypertension 2023    Hemiplegia, unspecified affecting right dominant side 2023    History of falling     Hyperlipidemia, unspecified 2023    Nontraumatic intracerebral hemorrhage, unspecified 2023    Personal history of transient ischemic attack (TIA), and cerebral infarction without residual deficits     Unspecified dementia, unspecified severity, without behavioral disturbance, psychotic disturbance, mood disturbance, and anxiety      History reviewed. No pertinent surgical history.   General Information       Row Name 23 0942          OT Time and Intention    Document Type evaluation  Presented to ED with AMS.  Head CT showed what appears to be subacute intra-axial hemorrhage; neuro consulted and reported nothing from a neurosurgical standpoint that needs to be addressed. Dx: Covid-19, AMS, dementia, HLD, brain bleed, HTN, Type 2 DM.  -LS     Mode of Treatment occupational therapy  -       Row Name 23 0942          General Information    Patient Profile Reviewed yes  -LS     Prior Level of Function --  Prior to recent hospitalization and rehab stay, Pt was I with all BADLs including fxl ambulation. Pt's [facility] reports the Pt has required some assistance with BADLs and has been utilizing a rwx while at rehab.  -LS     Existing  Precautions/Restrictions fall  -       Row Name 09/01/23 0942          Occupational Profile    Environmental Supports and Barriers (Occupational Profile) Walk in shower with shower chair and grab bars. Getting BSC to place over toilet. Sleeps in a regular bed. Other AD/DME: none.  -       Row Name 09/01/23 0942          Living Environment    People in Home grandchild(javon);other (see comments)  Was residing with granddaughter prior to recent hospitalization and rehab stay. Prior to this hospital stay, Pt had been at a SNF for approximately 1 week receiving rehab.  -       Row Name 09/01/23 0942          Home Main Entrance    Number of Stairs, Main Entrance one;other (see comments)  Pt's granddaughter reports they are getting a ramp.  -       Row Name 09/01/23 0942          Stairs Within Home, Primary    Number of Stairs, Within Home, Primary none  -       Row Name 09/01/23 0942          Cognition    Orientation Status (Cognition) oriented to;person  -       Row Name 09/01/23 0942          Safety Issues, Functional Mobility    Safety Issues Affecting Function (Mobility) ability to follow commands;at risk behavior observed;insight into deficits/self-awareness;judgment;positioning of assistive device;problem-solving;safety precaution awareness;safety precautions follow-through/compliance;sequencing abilities  -     Impairments Affecting Function (Mobility) balance;cognition;coordination;endurance/activity tolerance;motor planning;postural/trunk control;range of motion (ROM)  -     Cognitive Impairments, Mobility Safety/Performance attention;awareness, need for assistance;insight into deficits/self-awareness;judgment;problem-solving/reasoning;safety precaution awareness;safety precaution follow-through;sequencing abilities  -               User Key  (r) = Recorded By, (t) = Taken By, (c) = Cosigned By      Initials Name Provider Type    Phoebe Mata OTR/L Occupational Therapist                      Mobility/ADL's       Row Name 09/01/23 0942          Bed Mobility    Bed Mobility supine-sit;sit-supine  -LS     Supine-Sit [place] (Bed Mobility) maximum assist (25% patient effort);verbal cues  -LS     Sit-Supine [place] (Bed Mobility) moderate assist (50% patient effort);verbal cues  -LS     Assistive Device (Bed Mobility) bed rails  -LS       Row Name 09/01/23 0942          Transfers    Transfers sit-stand transfer  -LS       Row Name 09/01/23 0942          Sit-Stand Transfer    Sit-Stand [place] (Transfers) moderate assist (50% patient effort);verbal cues  -LS       Row Name 09/01/23 0942          Functional Mobility    Functional Mobility- Ind. Level maximum assist (25% patient effort)  Pt ambulated short distance in room utilizing rwx with Max A and constant verbal/visual/tactile cues for safety awareness, positioning of AD, and body mechanics due to constant R lateral lean.  -LS     Functional Mobility- Device walker, front-wheeled  -       Row Name 09/01/23 0942          Activities of Daily Living    BADL Assessment/Intervention upper body dressing;lower body dressing;toileting  -       Row Name 09/01/23 0942          Upper Body Dressing Assessment/Training    [place] Level (Upper Body Dressing) upper body dressing skills;maximum assist (25% patient effort)  -LS     Position (Upper Body Dressing) edge of bed sitting  -       Row Name 09/01/23 0942          Lower Body Dressing Assessment/Training    [place] Level (Lower Body Dressing) lower body dressing skills;dependent (less than 25% patient effort)  -LS     Position (Lower Body Dressing) supine  -       Row Name 09/01/23 0942          Toileting Assessment/Training    [place] Level (Toileting) toileting skills;dependent (less than 25% patient effort)  -LS     Position (Toileting) supine  -LS               User Key  (r) = Recorded By, (t) = Taken By, (c) = Cosigned By      Initials Name Provider Type    HOLLAND Chen  ANEGLA SandhuR/L Occupational Therapist                   Obj/Interventions       Row Name 09/01/23 0942          Sensory Assessment (Somatosensory)    Sensory Assessment (Somatosensory) unable/difficult to assess  -[facility] Name 09/01/23 0942          Vision Assessment/Intervention    Visual Impairment/Limitations unable/difficult to assess  -[facility] Name 09/01/23 0942          Range of Motion Comprehensive    General Range of Motion bilateral upper extremity ROM WFL  -[facility] Name 09/01/23 0942          Strength Comprehensive (MMT)    Comment, General Manual Muscle Testing (MMT) Assessment BUE strength grossly 4+/5.  -[facility] Name 09/01/23 0942          Motor Skills    Motor Skills coordination  -     Coordination bilateral;gross motor deficit;fine motor deficit;finger to nose;moderate impairment  -[facility] Name 09/01/23 0942          Balance    Balance Assessment sitting static balance;standing static balance;standing dynamic balance;sitting dynamic balance  -     Static Sitting Balance minimal assist;moderate assist  -LS     Dynamic Sitting Balance moderate assist;maximum assist  -LS     Position, Sitting Balance sitting edge of bed;supported  -LS     Static Standing Balance moderate assist;verbal cues  -     Dynamic Standing Balance maximum assist;verbal cues  -LS     Position/Device Used, Standing Balance supported;walker, front-wheeled  -               User Key  (r) = Recorded By, (t) = Taken By, (c) = Cosigned By      Initials Name Provider Type     Phoebe Chen OTR/L Occupational Therapist                   Goals/Plan       [facility] Name 09/01/23 0942          Transfer Goal 1 (OT)    Activity/Assistive Device (Transfer Goal 1, OT) commode, bedside without drop arms  -     [place] Level/Cues Needed (Transfer Goal 1, OT) minimum assist (75% or more patient effort)  -LS     Time Frame (Transfer Goal 1, OT) long term goal (LTG);10 days  -     Progress/Outcome (Transfer Goal 1,  OT) new goal  -LS       Row Name 09/01/23 0942          Dressing Goal 1 (OT)    Activity/Device (Dressing Goal 1, OT) dressing skills, all  -LS     [place]/Cues Needed (Dressing Goal 1, OT) moderate assist (50-74% patient effort)  -LS     Time Frame (Dressing Goal 1, OT) long term goal (LTG);10 days  -LS     Progress/Outcome (Dressing Goal 1, OT) new goal  -LS       Row Name 09/01/23 0942          Toileting Goal 1 (OT)    Activity/Device (Toileting Goal 1, OT) toileting skills, all  -LS     [place] Level/Cues Needed (Toileting Goal 1, OT) moderate assist (50-74% patient effort)  -LS     Time Frame (Toileting Goal 1, OT) long term goal (LTG);10 days  -LS     Progress/Outcome (Toileting Goal 1, OT) new goal  -LS       Row Name 09/01/23 0942          Therapy Assessment/Plan (OT)    Planned Therapy Interventions (OT) activity tolerance training;functional balance retraining;occupation/activity based interventions;ROM/therapeutic exercise;strengthening exercise;transfer/mobility retraining;adaptive equipment training;BADL retraining;patient/caregiver education/training  -LS               User Key  (r) = Recorded By, (t) = Taken By, (c) = Cosigned By      Initials Name Provider Type    LS Phoebe Chen OTR/L Occupational Therapist                   Clinical Impression       Row Name 09/01/23 1135          Pain Assessment    Pretreatment Pain Rating 0/10 - no pain  -LS     Posttreatment Pain Rating 0/10 - no pain  -       Row Name 09/01/23 1135          Plan of Care Review    Plan of Care Reviewed With patient;grandchild(javon)  -LS     Progress no change  -LS     Outcome Evaluation OT eval completed. Pt in fowlers upon therapist arriva; A&O to person only; No pain reported; Pt's granddaughter also present. Pt performed supine>sit utilizing bedrails with Max A and verbal cues for sequencing and positioning; sit>supine Mod A. Pt performed sit<>stand with Mod A and verbal/visual/tactile cues for use of proper body  mechanics; Pt demonstrates constant R lateral lean in sitting and standing positions that he was unable to correct with cueing. Pt ambulated short distance in room utilizing rwx with Max A and constant verbal/visual/tactile cues for safety awareness, positioning of AD, and body mechanics due to constant R lateral lean. Pt additionally demonstrates BUE FM/GM coordination deficits. Skilled OT intervention indicated in order to address deficits in fxl mobility, fxl activity tolerance, balance, coordination, strength, and use of adaptive techniques/equipment during performance of BADLs. Recommend SNF at discharge.  -       Row Name 09/01/23 1135          Therapy Assessment/Plan (OT)    Rehab Potential (OT) good, to achieve stated therapy goals  -     Criteria for Skilled Therapeutic Interventions Met (OT) yes;skilled treatment is necessary  -     Therapy Frequency (OT) 5 times/wk  -       Row Name 09/01/23 1135          Therapy Plan Review/Discharge Plan (OT)    Anticipated Discharge Disposition (OT) skilled nursing facility  -       Row Name 09/01/23 1135          Positioning and Restraints    Pre-Treatment Position in bed  -LS     Post Treatment Position bed  -LS     In Bed fowlers;call light within reach;encouraged to call for assist;side rails up x2;with family/caregiver  -               User Key  (r) = Recorded By, (t) = Taken By, (c) = Cosigned By      Initials Name Provider Type    LS Phoebe Chen, OTR/L Occupational Therapist                   Outcome Measures       Row Name 09/01/23 0942          How much help from another is currently needed...    Putting on and taking off regular lower body clothing? 1  -LS     Bathing (including washing, rinsing, and drying) 2  -LS     Toileting (which includes using toilet bed pan or urinal) 1  -LS     Putting on and taking off regular upper body clothing 2  -LS     Taking care of personal grooming (such as brushing teeth) 3  -LS     Eating meals 3  -LS      AM-PAC 6 Clicks Score (OT) 12  -       Row Name 09/01/23 1044 09/01/23 0800       How much help from another person do you currently need...    Turning from your back to your side while in flat bed without using bedrails? 2  - 3  -KP    Moving from lying on back to sitting on the side of a flat bed without bedrails? 2  - 3  -KP    Moving to and from a bed to a chair (including a wheelchair)? 2  - 3  -KP    Standing up from a chair using your arms (e.g., wheelchair, bedside chair)? 2  - 3  -KP    Climbing 3-5 steps with a railing? 1  - 2  -KP    To walk in hospital room? 2  - 2  -KP    AM-PAC 6 Clicks Score (PT) 11  - 16  -KP    Highest level of mobility 4 --> Transferred to chair/commode  - 5 --> Static standing  -      Row Name 09/01/23 1044 09/01/23 0942       Functional Assessment    Outcome Measure Options AM-PAC 6 Clicks Basic Mobility (PT)  - AM-PAC 6 Clicks Daily Activity (OT)  -              User Key  (r) = Recorded By, (t) = Taken By, (c) = Cosigned By      Initials Name Provider Type     Pardeep Wells, PT Physical Therapist    Phoebe Mata, ANGELAR/L Occupational Therapist    Gayle Beatty, RN Registered Nurse                    Occupational Therapy Education       Title: PT OT SLP Therapies (In Progress)       Topic: Occupational Therapy (In Progress)       Point: ADL training (Done)       Description:   Instruct learner(s) on proper safety adaptation and remediation techniques during self care or transfers.   Instruct in proper use of assistive devices.                  Learning Progress Summary             Patient Acceptance, E, VU,NR by  at 9/1/2023 1142                         Point: Home exercise program (Not Started)       Description:   Instruct learner(s) on appropriate technique for monitoring, assisting and/or progressing therapeutic exercises/activities.                  Learner Progress:  Not documented in this visit.              Point: Precautions (Done)        Description:   Instruct learner(s) on prescribed precautions during self-care and functional transfers.                  Learning Progress Summary             Patient Acceptance, E, VU,NR by  at 9/1/2023 1142                         Point: Body mechanics (Done)       Description:   Instruct learner(s) on proper positioning and spine alignment during self-care, functional mobility activities and/or exercises.                  Learning Progress Summary             Patient Acceptance, E, VU,NR by  at 9/1/2023 1142                                         User Key       Initials Effective Dates Name Provider Type Discipline     06/20/22 -  Phoebe Chen, OTR/L Occupational Therapist OT                  OT Recommendation and Plan  Planned Therapy Interventions (OT): activity tolerance training, functional balance retraining, occupation/activity based interventions, ROM/therapeutic exercise, strengthening exercise, transfer/mobility retraining, adaptive equipment training, BADL retraining, patient/caregiver education/training  Therapy Frequency (OT): 5 times/wk  Plan of Care Review  Plan of Care Reviewed With: patient, grandchild(javon)  Progress: no change  Outcome Evaluation: OT eval completed. Pt in fowlers upon therapist arriva; A&O to person only; No pain reported; Pt's granddaughter also present. Pt performed supine>sit utilizing bedrails with Max A and verbal cues for sequencing and positioning; sit>supine Mod A. Pt performed sit<>stand with Mod A and verbal/visual/tactile cues for use of proper body mechanics; Pt demonstrates constant R lateral lean in sitting and standing positions that he was unable to correct with cueing. Pt ambulated short distance in room utilizing rwx with Max A and constant verbal/visual/tactile cues for safety awareness, positioning of AD, and body mechanics due to constant R lateral lean. Pt additionally demonstrates BUE FM/GM coordination deficits. Skilled OT intervention  indicated in order to address deficits in fxl mobility, fxl activity tolerance, balance, coordination, strength, and use of adaptive techniques/equipment during performance of BADLs. Recommend SNF at discharge.     Time Calculation:         Time Calculation- OT       Row Name 09/01/23 0942             Time Calculation- OT    OT Start Time 0942  +10 minutes chart review  -      OT Stop Time 1029  -      OT Time Calculation (min) 47 min  -      OT Non-Billable Time (min) 57 min  -      OT Received On 09/01/23  -      OT Goal Re-Cert Due Date 09/11/23  -                User Key  (r) = Recorded By, (t) = Taken By, (c) = Cosigned By      Initials Name Provider Type     Phoebe Chen OTR/L Occupational Therapist                  Therapy Charges for Today       Code Description Service Date Service Provider Modifiers Qty    [id number]  OT EVAL MOD COMPLEXITY 4 9/1/2023 Phoebe Chen OTR/L GO 1                 Phoebe Chen OTR/KATHARINA  9/1/2023

## 2023-09-01 NOTE — CONSULTS
Neurology Consult Note    Consult Date: 2023  Referring MD: No ref. provider found  Reason for Consult: stroke/AMS    Patient: Bert Pereira (78 y.o. male)  MRN: [id number]  : 1945    History of Present Illness:   Bert Pereira is a 78 y.o. male right handed. Medical history obtained by grand daughter who is at the bedside. Patient and wife had been living independently in [place] until he had his last stroke 2023. He had a stroke in April and  of this year with minimal late affect and then while gathering belongings from his home in [place] he collapsed and was taken to local hospital there and found to have intracranial hemorrhage on left. He has had some right sided weakness then and was transferred to [place] for rehab. He ambulates with walker. At time of ICH he was taking ASA and Plavix and both had been d/c.  PMH also includes DM, HTN, HLD, cognitive impairment although grand daughter states he lived independently until 2023 and unaware of cognitive issues.      Yesterday, while grand daughter was visiting patient they were walking back to his room and he suddenly stopped speaking and stopped walking. He may have stared off but no rhythmic jerking or bowel/bladder incontinence noted. She noted he was not making sense when speaking. He was brought [facility] ED for evaluation. CT head showed subacute ICH left basal gangila. CTA head and neck showed no significant stenosis or occlusion.  Patient found to be Covid positive. He denies HA but seems to hold his head.   A1C 8.0  LDL 66  Na+ 134  TSH 2.92  Mg+ 2.0    Medical History:   Past Medical/Surgical Hx:  Past Medical History:   Diagnosis Date    Constipation, unspecified     Diabetes     Essential (primary) hypertension 2023    Hemiplegia, unspecified affecting right dominant side 2023    History of falling     Hyperlipidemia, unspecified 2023    Nontraumatic intracerebral hemorrhage, unspecified  08/28/2023    Personal history of transient ischemic attack (TIA), and cerebral infarction without residual deficits     Unspecified dementia, unspecified severity, without behavioral disturbance, psychotic disturbance, mood disturbance, and anxiety      History reviewed. No pertinent surgical history.    Medications On Admission:  Medications Prior to Admission   Medication Sig Dispense Refill Last Dose    amLODIPine (NORVASC) 10 MG tablet Take 1 tablet by mouth Daily.       atorvastatin (LIPITOR) 20 MG tablet Take 1 tablet by mouth Daily.       docusate sodium (COLACE) 100 MG capsule Take 1 capsule by mouth 2 (Two) Times a Day.       insulin glargine (LANTUS, SEMGLEE) 100 UNIT/ML injection Inject 5 Units under the skin into the appropriate area as directed Daily.       losartan (COZAAR) 100 MG tablet Take 1 tablet by mouth Daily.       bisacodyl (DULCOLAX) 10 MG suppository Insert 1 suppository into the rectum Daily As Needed for Constipation.       memantine (NAMENDA XR) 7 MG capsule sustained-release 24 hr extended release capsule Take 1 capsule by mouth Daily.          Current Medications:    Current Facility-Administered Medications:     acetaminophen (TYLENOL) tablet 650 mg, 650 mg, Oral, Q4H PRN **OR** acetaminophen (TYLENOL) suppository 650 mg, 650 mg, Rectal, Q4H PRN, Zoran Sloan DO    ascorbic acid (VITAMIN C) tablet 500 mg, 500 mg, Oral, Daily, Zoran Sloan DO    atorvastatin (LIPITOR) tablet 20 mg, 20 mg, Oral, Nightly, Zoran Sloan DO    dextrose (D50W) (25 g/50 mL) IV injection 25 g, 25 g, Intravenous, Q15 Min PRN, Zoran Sloan DO    dextrose (GLUTOSE) oral gel 15 g, 15 g, Oral, Q15 Min PRN, Zoran Sloan DO    glucagon (human recombinant) (GLUCAGEN DIAGNOSTIC) injection 1 mg, 1 mg, Intramuscular, Q15 Min PRN, Zoran Sloan,     Insulin Lispro (humaLOG) injection 2-7 Units, 2-7 Units, Subcutaneous, 4x Daily AC & at Bedtime, Zoran Sloan DO    labetalol  (NORMODYNE,TRANDATE) injection 10 mg, 10 mg, Intravenous, Q2H PRN, Zoran Sloan DO    labetalol (NORMODYNE,TRANDATE) injection 5 mg, 5 mg, Intravenous, Once, Loida Antunez DO    memantine (NAMENDA) tablet 2.5 mg, 2.5 mg, Oral, Q12H, Zoran Sloan DO    ondansetron (ZOFRAN) injection 4 mg, 4 mg, Intravenous, Q6H PRN, Zoran Sloan DO    sodium chloride 0.9 % flush 10 mL, 10 mL, Intravenous, PRN, Loida Antunez DO    sodium chloride 0.9 % flush 10 mL, 10 mL, Intravenous, Q12H, Zoran Sloan DO, 10 mL at 08/31/23 2333    sodium chloride 0.9 % flush 10 mL, 10 mL, Intravenous, PRN, Zoran Sloan DO    sodium chloride 0.9 % infusion 40 mL, 40 mL, Intravenous, PRN, Zoran Sloan DO    sodium chloride 0.9 % infusion, 75 mL/hr, Intravenous, Continuous, Zoran Sloan DO, Last Rate: 75 mL/hr at 08/31/23 2333, 75 mL/hr at 08/31/23 2333    zinc sulfate (ZINCATE) capsule 220 mg, 220 mg, Oral, Daily, Zoran Sloan DO     Allergies:  No Known Allergies    Social Hx:  Social History     Socioeconomic History    Marital status:    Tobacco Use    Smoking status: Never    Smokeless tobacco: Never   Vaping Use    Vaping Use: Never used   Substance and Sexual Activity    Alcohol use: Never    Drug use: Never    Sexual activity: Never       Family Hx:  History reviewed. No pertinent family history.  Physical Examination:   Vital Signs:  Vitals:    08/31/23 2314 09/01/23 0537 09/01/23 0736 09/01/23 1054   BP: 151/100 139/79 141/78 110/65   BP Location: Right arm Right arm Left arm Left arm   Patient Position: Lying Lying Lying Lying   Pulse: 104 86 91 72   Resp: 16 18 16 16   Temp: 98.6 °F (37 °C) 97.3 °F (36.3 °C) 98.7 °F (37.1 °C) 98.5 °F (36.9 °C)   TempSrc: Oral Oral Temporal Temporal   SpO2: 96% 94% 98% 95%   Weight:       Height:           General Exam:  Head:  Normocephalic, atraumatic  HEENT:  Neck supple  Fundoscopic Exam:  No signs of disc edema  CVS:  Regular rate and rhythm.   No murmurs  Carotid Examination:  No bruits  Lungs:  Clear to auscultation  Abdomen:  Nontender, Nondistended  Extremities:  No signs of peripheral edema  Skin:  No rashes    Neurologic Exam:    Mental Status:    -Awake, Alert, Oriented X to person and when given choices that he is in the hospital but could not state the name. Not able to state month or year. Could not name president of [place].   -No word finding difficulties  -No aphasia  -No dysarthria  -Follows simple  commands  Palmomental reflex illicited    CN II:  Visual fields full.  Pupils equally reactive to light  CN III, IV, VI:  Extraocular Muscles full with no signs of nystagmus  CN V:  Facial sensory is symmetric with no asymmetries.  CN VII:  Facial motor symmetric  CN VIII:  Gross hearing intact bilaterally  CN IX:  Palate elevates symmetrically  CN X:  Palate elevates symmetrically  CN XI:  Shoulder shrug symmetric  CN XII:  Tongue is midline on protrusion    Motor: (strength out of 5:  1= minimal movement, 2 = movement in plane of gravity, 3 = movement against gravity, 4 = movement against some resistance, 5 = full strength)    -Right Upper Ext: Proximal: 5 Distal: 5  -Left Upper Ext: Proximal: 5 Distal: 5    -Right Lower Ext: Proximal: 5 Distal: 5  -Left Lower Ext: Proximal: 5 Distal: 5    DTR:  -Right   Biceps: 2+ Triceps: 2+ Brachioradialis: 2+   Patella: 2+ Ankle: 2+ Neg Babinski  -Left   Biceps: 2+ Triceps: 2+ Brachioradialis: 2+   Patella: 2+ Ankle: 2+ Neg Babinski    Sensory:  -Intact to light touch, pinprick, temperature, pain, and proprioception    Coordination:  -Finger to nose intact  -Heel to shin intact  -No ataxia    Gait  -not tested for safety reasons.      Recent Diagnostics:   Laboratory Results:   - Reviewed in EMR  Lab Results   Component Value Date    GLUCOSE 185 (H) 09/01/2023    CALCIUM 9.9 09/01/2023     (L) 09/01/2023    K 4.3 09/01/2023    CO2 22.0 09/01/2023    CL 98 09/01/2023    BUN 18 09/01/2023    CREATININE 0.77  09/01/2023    BCR 23.4 09/01/2023    ANIONGAP 13.0 09/01/2023     Lab Results   Component Value Date    WBC 8.62 09/01/2023    HGB 15.4 09/01/2023    HCT 49.2 09/01/2023    MCV 93.2 09/01/2023     09/01/2023     Lab Results   Component Value Date    PTT 30.1 08/31/2023    INR 0.94 08/31/2023     Lab Results   Component Value Date    TRIG 59 09/01/2023    HDL 47 09/01/2023    LDL 66 09/01/2023     Lab Results   Component Value Date    HGBA1C 8.00 (H) 08/31/2023       Imaging Results:  Imaging Results (Last 24 Hours)       Procedure Component Value Units Date/Time    XR Chest 1 View [[id number]] Collected: 08/31/23 1857     Updated: 08/31/23 1900    Narrative:      EXAMINATION: Chest 1 view 08/31/2023     HISTORY: Acute stroke protocol.     FINDINGS: Upright frontal projection of the chest demonstrates mild  cardiomegaly. The thoracic aorta and great vessels are ectatic. Lungs  are fully expanded and clear. No consolidative pneumonia or effusion.       Impression:      1. No acute disease.  This report was finalized on 08/31/2023 18:57 by Dr. Martinez Gonzales MD.    CT CEREBRAL PERFUSION WITH & WITHOUT CONTRAST [[id number]] Collected: 08/31/23 1838     Updated: 08/31/23 1845    Narrative:      Indication: Confusion     Exam:      1. Perfusion CT is performed to acquire images tracking the temporal  course of iodinated contrast material passing through the cerebral  circulation. Perfusion parameters, such as cerebral blood flow (CBF),  cerebral blood volume (CBV), mean transit time (MTT), etc. are  calculated by [employer] with additional provided perfusion maps and  estimated stroke volumes.  2. Automated exposure control (AEC) protocols are utilized on the  scanner to ensure dose lowered technique.      Comparison: Noncontrast CT brain and brain angiogram dated 8/31/2023  5:42 PM CDT     Findings:     Abnormal perfusion exam, with size and distribution of the perfusion  abnormality described below.      Distribution: Perfusion abnormality involves the left basal ganglia and  a site of recent intra-axial hemorrhage..     Tmax >6.0 seconds volume: 11 ml     CBF < 30% volume: 0 ml     Mismatch volume: 11 ml      Mismatch ratio: Infinite       Impression:      Impression:  1. There is noted to be delayed transit time of contrast in the left  basal ganglia at the site of a recent intra-axial hemorrhage. There is  associated edema and some mass effect with subtle effacement of the  frontal horn of the left lateral ventricle at this site related to the  recent hemorrhage likely accounting for the delayed perfusion. I do not  see evidence of core infarct or significant penumbra to suggest large  vessel occlusion/acute ischemia.  This report was finalized on 08/31/2023 18:42 by Dr. Martinez Gonzales MD.    CT Angiogram Head w AI Analysis of LVO [[id number]] Collected: 08/31/23 1818     Updated: 08/31/23 1841    Narrative:      EXAMINATION: CT angiogram of the neck and head with contrast with AI  analysis for LVO. 08/31/2023     HISTORY: Acute stroke suspected. Neurologic deficit.     DOSE: 414 mGycm. All CT scans are performed using dose optimization  techniques as appropriate to the performed exam and including at least  one of the following: Automated exposure control, adjustment of the mA  and/or kV according to size, and the use of the iterative reconstruction  technique..     FINDINGS: Multiple contiguous axial images are obtained from the aortic  arch through the vertex following intravenous contrast infusion with  reformatted images obtained in the sagittal and coronal projections from  the original data set. MIPS are also obtained.     There is atheromatous calcification of the thoracic aorta and proximal  great vessels without evidence of aneurysm or dissection. No rate  limiting stenosis at the origin of the great vessels. There are  emphysematous changes of the lungs. The thyroid is homogeneous in  density  without evidence of enlargement or nodularity. Level of the true  and false cords is unremarkable. No pathologically enlarged cervical  chain or posterior triangle lymphadenopathy is present.     The origin of the vertebral arteries are widely patent. The left  vertebral artery is dominant. There is mild calcific plaquing involving  the intracranial segment of the left vertebral artery without rate  limiting stenosis. Both vertebral arteries are otherwise widely patent  to the basilar artery.     There is scattered plaquing involving the right common carotid artery  without evidence of rate limiting stenosis. There is calcific plaquing  involving the carotid bifurcation with involvement of the carotid bulb  and proximal ICA. This results in an approximate 40% cross-sectional  stenosis within the proximal right ICA. The right ICA is tortuous but  otherwise widely patent to the skull base.     Examination of the left common carotid artery demonstrates scattered  plaquing without rate limiting stenosis. There is calcific plaquing  involving the carotid bulb and proximal left ICA with a less than 25%  cross-sectional stenosis at the level of the left carotid bulb. The left  ICA is tortuous.     Examination of the [place] including AI analysis for LVO is  also performed including MIPS.     The left vertebral artery is dominant. Mild calcific plaquing involving  the intracranial segment of the left vertebral artery does not result in  significant stenosis. Both vertebral arteries are patent to the basilar  artery. The posterior inferior cerebellar arteries are normal in  appearance. There is mild stenosis involving the mid segment of the  basilar artery. The superior cerebellar artery are widely patent. There  is a persistent fetal origin of the right posterior cerebral artery. The  left posterior cerebral artery emanates from the distal basilar artery  with a patent posterior communicating artery also present  and  contributing to the posterior circulation.     Both distal extracranial ICAs are widely patent. There is calcific  plaquing involving the petrous segment of the right ICA without rate  limiting stenosis. There is calcific plaquing involving the cavernous  segment of both ICAs with mild associated stenosis. There is also  calcific plaquing involving the distal cavernous and proximal  supraclinoid segment of both ICAs with mild associated bilateral  stenosis. Both vessels are patent to the terminus. The anterior and  middle cerebral arteries are normal in appearance with no evidence of  intraluminal thrombus or berry aneurysm. No focal steno-occlusive  disease. Previously described focus of linear density within the mesial  left temporal lobe appears to be separable from the left middle cerebral  artery branches       Impression:      1.. Calcific plaquing involving the intracranial segment of the left  vertebral artery without evidence of rate limiting stenosis. There is a  small short segment stenosis within the basilar artery above the  anterior inferior cerebellar artery origin. The superior cerebellar  arteries are widely patent. The right posterior cerebral artery  represents a persistent fetal origin from the anterior circulation. The  left PCA emanates from the basilar artery but also has a contribution  from the left posterior communicating artery.  2. Calcific plaquing involving the petrous segment of the right ICA as  well as the cavernous and proximal supraclinoid segment of both ICAs  with mild associated stenosis. Both ICAs are patent to the terminus. The  anterior and middle cerebral arteries are normal in appearance with no  intraluminal thrombus or berry aneurysm demonstrated. No focal stenosis  is appreciated within the anterior or middle cerebral arteries.  3. 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  This is slightly more prominent on the right than on the left with  an  approximate 40% cross-sectional stenosis within the proximal right ICA  and a less than 25% cross-sectional stenosis at the level of the left  carotid bulb. The more distal extracranial ICAs are tortuous but  otherwise patent.  This report was finalized on 08/31/2023 18:37 by Dr. Martinez Gonzales MD.    CT Angiogram Neck [[id number]] Collected: 08/31/23 1818     Updated: 08/31/23 1841    Narrative:      EXAMINATION: CT angiogram of the neck and head with contrast with AI  analysis for LVO. 08/31/2023     HISTORY: Acute stroke suspected. Neurologic deficit.     DOSE: 414 mGycm. All CT scans are performed using dose optimization  techniques as appropriate to the performed exam and including at least  one of the following: Automated exposure control, adjustment of the mA  and/or kV according to size, and the use of the iterative reconstruction  technique..     FINDINGS: Multiple contiguous axial images are obtained from the aortic  arch through the vertex following intravenous contrast infusion with  reformatted images obtained in the sagittal and coronal projections from  the original data set. MIPS are also obtained.     There is atheromatous calcification of the thoracic aorta and proximal  great vessels without evidence of aneurysm or dissection. No rate  limiting stenosis at the origin of the great vessels. There are  emphysematous changes of the lungs. The thyroid is homogeneous in  density without evidence of enlargement or nodularity. Level of the true  and false cords is unremarkable. No pathologically enlarged cervical  chain or posterior triangle lymphadenopathy is present.     The origin of the vertebral arteries are widely patent. The left  vertebral artery is dominant. There is mild calcific plaquing involving  the intracranial segment of the left vertebral artery without rate  limiting stenosis. Both vertebral arteries are otherwise widely patent  to the basilar artery.     There is scattered  plaquing involving the right common carotid artery  without evidence of rate limiting stenosis. There is calcific plaquing  involving the carotid bifurcation with involvement of the carotid bulb  and proximal ICA. This results in an approximate 40% cross-sectional  stenosis within the proximal right ICA. The right ICA is tortuous but  otherwise widely patent to the skull base.     Examination of the left common carotid artery demonstrates scattered  plaquing without rate limiting stenosis. There is calcific plaquing  involving the carotid bulb and proximal left ICA with a less than 25%  cross-sectional stenosis at the level of the left carotid bulb. The left  ICA is tortuous.     Examination of the [place] including AI analysis for LVO is  also performed including MIPS.     The left vertebral artery is dominant. Mild calcific plaquing involving  the intracranial segment of the left vertebral artery does not result in  significant stenosis. Both vertebral arteries are patent to the basilar  artery. The posterior inferior cerebellar arteries are normal in  appearance. There is mild stenosis involving the mid segment of the  basilar artery. The superior cerebellar artery are widely patent. There  is a persistent fetal origin of the right posterior cerebral artery. The  left posterior cerebral artery emanates from the distal basilar artery  with a patent posterior communicating artery also present and  contributing to the posterior circulation.     Both distal extracranial ICAs are widely patent. There is calcific  plaquing involving the petrous segment of the right ICA without rate  limiting stenosis. There is calcific plaquing involving the cavernous  segment of both ICAs with mild associated stenosis. There is also  calcific plaquing involving the distal cavernous and proximal  supraclinoid segment of both ICAs with mild associated bilateral  stenosis. Both vessels are patent to the terminus. The anterior  and  middle cerebral arteries are normal in appearance with no evidence of  intraluminal thrombus or berry aneurysm. No focal steno-occlusive  disease. Previously described focus of linear density within the mesial  left temporal lobe appears to be separable from the left middle cerebral  artery branches       Impression:      1.. Calcific plaquing involving the intracranial segment of the left  vertebral artery without evidence of rate limiting stenosis. There is a  small short segment stenosis within the basilar artery above the  anterior inferior cerebellar artery origin. The superior cerebellar  arteries are widely patent. The right posterior cerebral artery  represents a persistent fetal origin from the anterior circulation. The  left PCA emanates from the basilar artery but also has a contribution  from the left posterior communicating artery.  2. Calcific plaquing involving the petrous segment of the right ICA as  well as the cavernous and proximal supraclinoid segment of both ICAs  with mild associated stenosis. Both ICAs are patent to the terminus. The  anterior and middle cerebral arteries are normal in appearance with no  intraluminal thrombus or berry aneurysm demonstrated. No focal stenosis  is appreciated within the anterior or middle cerebral arteries.  3. 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  This is slightly more prominent on the right than on the left with an  approximate 40% cross-sectional stenosis within the proximal right ICA  and a less than 25% cross-sectional stenosis at the level of the left  carotid bulb. The more distal extracranial ICAs are tortuous but  otherwise patent.  This report was finalized on 08/31/2023 18:37 by Dr. Martinez Gonzales MD.    CT Head Without Contrast Stroke Protocol [[id number]] Collected: 08/31/23 1751     Updated: 08/31/23 1807    Narrative:      CT HEAD WO CONTRAST STROKE PROTOCOL- 8/31/2023 5:40 PM CDT     HISTORY: Neuro deficit,  acute, stroke suspected     COMPARISON: None      DLP: 748 mGy cm. All CT scans are performed using dose optimization  techniques as appropriate to the performed exam and including at least  one of the following: Automated exposure control, adjustment of the mA  and/or kV according to size, and the use of the iterative reconstruction  technique.     TECHNIQUE: Serial axial tomographic images of the brain were obtained  without the use of intravenous contrast.      FINDINGS:   The midline structures are nondisplaced. There is diffuse atrophy of the  brain with prominence of the subarachnoid spaces and ventricular  enlargement. There is evidence of a previous posterior division right  MCA territory infarct with encephalomalacia within the right temporal  and parietal lobe. A remote infarct involving the right basal ganglia  and left thalamus are also present.     There is a subtle focus of increased density within the left basal  ganglia. This has ill-defined margins measuring approximately 2.6 x 2.0  cm in size with subtle effacement of the frontal horn of the left  lateral ventricle. I suspect this may represent a focus of subacute  hemorrhage. There is evidence of intraventricular hemorrhage with blood  layering dependently within the posterior horn of both lateral  ventricles. No evidence of acute intra-axial or extra-axial hemorrhage.  There is also evidence of a previous right frontal lobe infarct or  posttraumatic change with encephalomalacia.     Noted on image 13 of series 5 and image 12 of series 9 is focal more  linear hyperdensity within the mesial left temporal lobe. This is in  close proximity to the M2 segments of the left MCA and could potentially  represent some clot within one of these vessels. CT angiography may be  helpful for better delineation.     The orbits are unremarkable.     The visualized paranasal sinuses and mastoid air cells are normally  aerated.       Impression:      1.. Subtle  hyperdensity within the left basal ganglia with some mass  effect and mild effacement of the frontal horn of the left lateral  ventricle. Radiographically I would favor that this represents a focus  of subacute intra-axial hemorrhage. There is evidence of blood layering  dependently within the posterior horn of both lateral ventricles. No  evidence of acute appearing intra or extra-axial hemorrhage although the  blood layering within the posterior horn of the left lateral ventricle  is relatively hyperdense.  2. Focal hyperdensity which is more linear distribution within the  mesial left temporal lobe. This could potentially represent some  hyperdense clot within a left M2 vessel versus an additional small focus  of intra-axial hemorrhage. This is different in density in comparison  with the adjacent hemorrhage in the left basal ganglia. CT angiography  may be helpful for further characterization.  3. Diffuse atrophy and small vessel disease. Evidence of previous  infarcts involving the right frontal lobe as well as the right temporal  and parietal lobe in the MCA distribution. Remote right basal ganglia  and left thalamic lacunar infarcts are also present.     This report was finalized on 08/31/2023 18:03 by Dr. Martinez Gonzales MD.             No results found for this or any previous visit.    Assessment & Plan:   Patient and wife had been living independently in [place] until he had his last stroke 8/2023. He had a stroke in April and June of this year with minimal late affect and then while gathering belongings from his home in [place] he collapsed and was taken to local hospital there and found to have intracranial hemorrhage on left. He has had some right sided weakness then and was transferred to [facility] for rehab. He ambulates with walker. At time of ICH he was taking ASA and Plavix and both had been d/c.  PMH also includes DM, HTN, HLD, cognitive impairment although grand daughter  states he lived independently until 8/2023 and unaware of cognitive issues.      Yesterday, while grand daughter was visiting patient they were walking back to his room and he suddenly stopped speaking and stopped walking. He may have stared off but no rhythmic jerking or bowel/bladder incontinence noted. She noted he was not making sense when speaking. He was brought to [facility] ED for evaluation. CT head showed subacute ICH left basal gangila. CTA head and neck showed no significant stenosis or occlusion.  Patient found to be Covid positive. He denies HA but seems to hold his head.       Active Hospital Problems    Diagnosis  POA    **AMS (altered mental status) [R41.82]  Yes    COVID-19 virus detected [U07.1]  Yes    Dementia [F03.90]  Yes    Hyperlipidemia [E78.5]  Yes    Brain bleed [I61.9]  Yes    Benign essential HTN [I10]  Unknown    DM2 (diabetes mellitus, type 2) [E11.9]  Unknown      Impression:  Episode of altered speech and staring. History of 2 ischemic stroke 4/2023 and 6/2023 and ICH 8/2203. If concern for new ischemic stroke IV TNK not considered as patient  has had stroke in the last 3 months and also presence of subacute ICA on CT head. Patient has been off antiplatelet since 8/2023.   Recent strokes as above.  Covid positive  Cognitive impairment. I suspect patient has been very high functioning and now impairment has revealed with multiple hospitalizations for stroke and now Covid positive  DM with hyperglycemia        Plan:   MRI brain w/o to rule out new stroke.  If new acute stroke is seen can proceed with stroke work up.  Will need to determine when safe to resume antiplatelet therapy for secondary stroke prevention  OT/PT/  SCD for DVT prophylaxis.   Lipitor 20 mg for now given patient advanced age and LDL is at goal less than 70.   NPO until cleared by .   Continue Namenda.      Awilda Fuentes, APRN  09/01/23  11:22 CDT    Medical Decision Making    Number/Complexity of  Problems  Moderate  1 undiagnosed new problem with uncertain prognosis -   1 acute illness with systemic symptoms -   High  1 acute or chronic illness that pose a threat to life/body function -   High acute illness that pose threat to life/body function     MDM Data  Moderate - 1/3 categories  Extensive - 2/3 categories    Category 1: 3 of the following  Review of external notes  Review of results  Ordering of each unique test  Independent historian  Category 2:  Independent interpretation of test (ex: imaging)  Category 3:  Discussion of management with another provider    extensive     Treatment Plan  Moderate - Prescription Drug management  High  Drug therapy requiring intensive monitoring for toxicity  Decision regarding hospitalization or escalation of care  De-escalate care/DNR decisions  high

## 2023-09-01 NOTE — ED NOTES
BLOOD BANK [id number]    Patient was educated on the signs and symptoms of a transfusion reaction which may include:    Hives  Itching/Rash/Urticarias  Flushing  Chills  Fever (if greater than 1.8 degrees F or 1 degree C from the pre-transfusion baseline temperature And the increased result is a temperature greater than or equal to 100.4 °F)  Nausea/Vomiting  Chest/Abdominal/Back Pain  Pain at the Infusion Site  Headache  Muscle Aches/Myalgia  Dyspnea/Pulmonary Edema/Rales  Cyanosis  Coughing/Wheezing  Shock  Rigors  Hypoxemia  Hypertension  Hypotension  Abnormal Bleeding  Oliguria/Anuria  Hemoglobinuria  Tachycardia    Patient to notify staff if any signs/symptoms occur.  Patient verbalizes understanding.

## 2023-09-02 LAB
GLUCOSE BLDC GLUCOMTR-MCNC: 150 MG/DL (ref 70–130)
GLUCOSE BLDC GLUCOMTR-MCNC: 180 MG/DL (ref 70–130)
GLUCOSE BLDC GLUCOMTR-MCNC: 192 MG/DL (ref 70–130)
GLUCOSE BLDC GLUCOMTR-MCNC: 216 MG/DL (ref 70–130)
GLUCOSE BLDC GLUCOMTR-MCNC: 220 MG/DL (ref 70–130)
GLUCOSE BLDC GLUCOMTR-MCNC: 222 MG/DL (ref 70–130)
QT INTERVAL: 376 MS
QTC INTERVAL: 472 MS

## 2023-09-02 PROCEDURE — 63710000001 INSULIN LISPRO (HUMAN) PER 5 UNITS: Performed by: INTERNAL MEDICINE

## 2023-09-02 PROCEDURE — 93010 ELECTROCARDIOGRAM REPORT: CPT | Performed by: INTERNAL MEDICINE

## 2023-09-02 PROCEDURE — 93005 ELECTROCARDIOGRAM TRACING: CPT | Performed by: INTERNAL MEDICINE

## 2023-09-02 PROCEDURE — 97116 GAIT TRAINING THERAPY: CPT

## 2023-09-02 PROCEDURE — 82948 REAGENT STRIP/BLOOD GLUCOSE: CPT

## 2023-09-02 PROCEDURE — 99233 SBSQ HOSP IP/OBS HIGH 50: CPT | Performed by: CLINICAL NURSE SPECIALIST

## 2023-09-02 PROCEDURE — G0378 HOSPITAL OBSERVATION PER HR: HCPCS

## 2023-09-02 PROCEDURE — 97110 THERAPEUTIC EXERCISES: CPT

## 2023-09-02 RX ORDER — ASPIRIN 81 MG/1
81 TABLET, CHEWABLE ORAL DAILY
Status: DISCONTINUED | OUTPATIENT
Start: 2023-09-02 | End: 2023-09-05 | Stop reason: HOSPADM

## 2023-09-02 RX ADMIN — Medication 10 ML: at 21:21

## 2023-09-02 RX ADMIN — INSULIN LISPRO 3 UNITS: 100 INJECTION, SOLUTION INTRAVENOUS; SUBCUTANEOUS at 17:06

## 2023-09-02 RX ADMIN — MEMANTINE HYDROCHLORIDE 2.5 MG: 5 TABLET, FILM COATED ORAL at 09:10

## 2023-09-02 RX ADMIN — Medication 10 ML: at 09:11

## 2023-09-02 RX ADMIN — ATORVASTATIN CALCIUM 20 MG: 10 TABLET, FILM COATED ORAL at 21:21

## 2023-09-02 RX ADMIN — INSULIN LISPRO 3 UNITS: 100 INJECTION, SOLUTION INTRAVENOUS; SUBCUTANEOUS at 12:41

## 2023-09-02 RX ADMIN — MEMANTINE HYDROCHLORIDE 2.5 MG: 5 TABLET, FILM COATED ORAL at 21:21

## 2023-09-02 RX ADMIN — INSULIN LISPRO 2 UNITS: 100 INJECTION, SOLUTION INTRAVENOUS; SUBCUTANEOUS at 09:10

## 2023-09-02 RX ADMIN — INSULIN LISPRO 3 UNITS: 100 INJECTION, SOLUTION INTRAVENOUS; SUBCUTANEOUS at 21:21

## 2023-09-02 RX ADMIN — OXYCODONE HYDROCHLORIDE AND ACETAMINOPHEN 500 MG: 500 TABLET ORAL at 09:10

## 2023-09-02 RX ADMIN — ASPIRIN 81 MG: 81 TABLET, CHEWABLE ORAL at 12:41

## 2023-09-02 RX ADMIN — ZINC SULFATE 220 MG (50 MG) CAPSULE 220 MG: CAPSULE at 09:10

## 2023-09-02 NOTE — PLAN OF CARE
Goal Outcome Evaluation:  Plan of Care Reviewed With: patient, family        Progress: no change  Outcome Evaluation: No acute events overnight. Remains AOx1, to self. NIH q4h. Inc of bowel and bladder, utilized brief. Maintained isolation precautions. Tolerating diet. BG q6h, stable. VSS.       0630 Call received from monitors reporting pt eriberto in 30s with 2nd deg AV block for 5 beats, returned to 80s NSR. Pt resting quietly in bed. Denies any chest pain or SOB. Respirations even and unlabored. Call placed to . New order received for stat EKG. EKG obtained and in chart.

## 2023-09-02 NOTE — PLAN OF CARE
Goal Outcome Evaluation:  Plan of Care Reviewed With: patient        Progress: improving  Outcome Evaluation: Pt was up in the chair with family in the room.  Pt was confused but able to follow most commands.  Pt had difficulty processing LE exercises and required multiple cues and demonstration to be able to perform them.  Transfered sit to stand with min assist.  Pt did not have any right lean today.  Amb 10' x 3 with RWX  and min assist.  Pt required cues for safety, AD placement, and correcting gait.  Will continue to work with pt to increase strength and and progress amb as pt is able.

## 2023-09-02 NOTE — PLAN OF CARE
Goal Outcome Evaluation:      Pt alert and oriented x1 to self. NIHSS remains a 9.. VSS. no c/o pain. WILLIS. PPP. SCDS for VTE. . Tolerating regular diet. Skin intact. Voiding via in brief. Last BM 9/1/23. BS monitoring AC/HS Isolation enhanced droplet ( COVID) D/c plans to SNF when medically clear. . Call light within reach. Safety maintained.

## 2023-09-02 NOTE — THERAPY TREATMENT NOTE
Acute Care - Physical Therapy Treatment Note  [facility]     Patient Name: Bert Pereira  : 1945  MRN: [id number]  Today's Date: 2023      Visit Dx:     ICD-10-CM ICD-9-CM   1. Altered mental status, unspecified altered mental status type  R41.82 780.97   2. Impaired mobility [Z74.09 (ICD-10-CM)]  Z74.09 799.89   3. Dysphagia, pharyngeal phase  R13.13 787.23     Patient Active Problem List   Diagnosis    AMS (altered mental status)    COVID-19 virus detected    Dementia    Hyperlipidemia    Brain bleed    Benign essential HTN    DM2 (diabetes mellitus, type 2)     Past Medical History:   Diagnosis Date    Constipation, unspecified     Diabetes     Essential (primary) hypertension 2023    Hemiplegia, unspecified affecting right dominant side 2023    History of falling     Hyperlipidemia, unspecified 2023    Nontraumatic intracerebral hemorrhage, unspecified 2023    Personal history of transient ischemic attack (TIA), and cerebral infarction without residual deficits     Unspecified dementia, unspecified severity, without behavioral disturbance, psychotic disturbance, mood disturbance, and anxiety      History reviewed. No pertinent surgical history.  PT Assessment (last 12 hours)       PT Evaluation and Treatment       Row Name 23 1350          Physical Therapy Time and Intention    Subjective Information complains of;weakness;fatigue  -LAKIA     Document Type therapy note (daily note)  -LAKIA     Mode of Treatment physical therapy  -LAKIA     Comment confusion, but able to follow some commands  -LAKIA       Row Name 23 1350          General Information    Existing Precautions/Restrictions fall  -LAKIA     Barriers to Rehab cognitive status  -LAKIA       Row Name 23 1350          Pain    Pretreatment Pain Rating 0/10 - no pain  -LAKIA     Posttreatment Pain Rating 0/10 - no pain  -       Row Name 23 1350          Bed Mobility    Comment, (Bed Mobility) chair  -       Row Name  09/02/23 1350          Transfers    Transfers sit-stand transfer;stand-sit transfer  -LAKIA       Row Name 09/02/23 1350          Sit-Stand Transfer    Sit-Stand Jackson (Transfers) verbal cues;minimum assist (75% patient effort);moderate assist (50% patient effort)  -LAKIA     Assistive Device (Sit-Stand Transfers) walker, front-wheeled  -LAKIA       Row Name 09/02/23 1350          Stand-Sit Transfer    Stand-Sit Jackson (Transfers) verbal cues;minimum assist (75% patient effort)  -LAKIA     Assistive Device (Stand-Sit Transfers) walker, front-wheeled  -LAKIA       Row Name 09/02/23 1350          Gait/Stairs (Locomotion)    Jackson Level (Gait) verbal cues;minimum assist (75% patient effort)  -LAKIA     Assistive Device (Gait) walker, front-wheeled  -LAKIA     Distance in Feet (Gait) 10' x 3 with sitting rest  -LAKIA     Deviations/Abnormal Patterns (Gait) nandini decreased  -LAKIA     Bilateral Gait Deviations forward flexed posture  -LAKIA     Comment, (Gait/Stairs) pt pushes walker too far forward, cues to increase step length and to clear feet when taking steps  -LAKIA       Row Name 09/02/23 1350          Motor Skills    Comments, Therapeutic Exercise sitting AAROM BLE, pt had difficulty following cues for exercises and continuing with exercise  -LAKIA     Additional Documentation Comments, Therapeutic Exercise (Row)  -LAKIA       Row Name 09/02/23 1350          Positioning and Restraints    Pre-Treatment Position sitting in chair/recliner  -LAKIA     Post Treatment Position chair  -LAKIA     In Chair sitting;call light within reach;encouraged to call for assist;with family/caregiver  -LAKIA               User Key  (r) = Recorded By, (t) = Taken By, (c) = Cosigned By      Initials Name Provider Type    Leoncio Pond, PTA Physical Therapist Assistant                    Physical Therapy Education       Title: PT OT SLP Therapies (Done)       Topic: Physical Therapy (Done)       Point: Mobility training (Done)       Learning Progress  Summary             Patient Acceptance, E,TB, VU by  at 9/2/2023 1410    Acceptance, E,D, DU,VU by  at 9/1/2023 1045    Comment: benefits of PT and POC, call for A OOB   Family Acceptance, E,TB, VU by  at 9/2/2023 1410                         Point: Precautions (Done)       Learning Progress Summary             Patient Acceptance, E,TB, VU by  at 9/2/2023 1410    Acceptance, E,D, DU,VU by  at 9/1/2023 1045    Comment: benefits of PT and POC, call for A OOB   Family Acceptance, E,TB, VU by  at 9/2/2023 1410                                         User Key       Initials Effective Dates Name Provider Type Discipline     02/03/23 -  Pardeep Wells, PT Physical Therapist PT     07/05/23 -  Gayle Mckinney RN Registered Nurse Nurse                  PT Recommendation and Plan     Plan of Care Reviewed With: patient  Progress: improving  Outcome Evaluation: Pt was up in the chair with family in the room.  Pt was confused but able to follow most commands.  Pt had difficulty processing LE exercises and required multiple cues and demonstration to be able to perform them.  Transfered sit to stand with min assist.  Pt did not have any right lean today.  Amb 10' x 3 with RWX  and min assist.  Pt required cues for safety, AD placement, and correcting gait.  Will continue to work with pt to increase strength and and progress amb as pt is able.   Outcome Measures       Row Name 09/02/23 6210             How much help from another person do you currently need...    Turning from your back to your side while in flat bed without using bedrails? 2  -LAKIA      Moving from lying on back to sitting on the side of a flat bed without bedrails? 2  -LAKIA      Moving to and from a bed to a chair (including a wheelchair)? 2  -LAKIA      Standing up from a chair using your arms (e.g., wheelchair, bedside chair)? 2  -LAKIA      Climbing 3-5 steps with a railing? 1  -LAKIA      To walk in hospital room? 2  -LAKIA      AM-PAC 6 Clicks Score (PT)  11  -LAKIA         Functional Assessment    Outcome Measure Options AM-PAC 6 Clicks Basic Mobility (PT)  -LAKIA                User Key  (r) = Recorded By, (t) = Taken By, (c) = Cosigned By      Initials Name Provider Type    Leoncio Pond PTA Physical Therapist Assistant                     Time Calculation:    PT Charges       Row Name 09/02/23 1350             Time Calculation    Start Time 1350  -LAKIA      Stop Time 1429  -LAKIA      Time Calculation (min) 39 min  -LAKIA      PT Received On 09/02/23  -LAKIA         Time Calculation- PT    Total Timed Code Minutes- PT 39 minute(s)  -LAKIA         Timed Charges    10634 - PT Therapeutic Exercise Minutes 12  -LAKIA      61254 - Gait Training Minutes  27  -LAKIA         Total Minutes    Timed Charges Total Minutes 39  -LAKIA       Total Minutes 39  -LAKIA                User Key  (r) = Recorded By, (t) = Taken By, (c) = Cosigned By      Initials Name Provider Type    Leoncio Pond PTA Physical Therapist Assistant                  Therapy Charges for Today       Code Description Service Date Service Provider Modifiers Qty    65128620629 HC GAIT TRAINING EA 15 MIN 9/2/2023 Leoncio Srivastava PTA GP 2    95241206890 HC PT THER PROC EA 15 MIN 9/2/2023 Leoncio Srivastava PTA GP 1            PT G-Codes  Outcome Measure Options: AM-PAC 6 Clicks Basic Mobility (PT)  AM-PAC 6 Clicks Score (PT): 11  AM-PAC 6 Clicks Score (OT): 12    Leoncio Srivastava PTA  9/2/2023

## 2023-09-02 NOTE — PROGRESS NOTES
Chief Complaint: Confusion  HPI   78-year-old man with past medical history of hypertension diabetes mellitus and recurrent ischemic stroke, has recently about 10 days ago was diagnosed with hemorrhagic stroke treated conservatively and patient sent to rehab, patient before the hemorrhagic stroke with independent but after that there is significant decline of the cognitive function, in rehab it was noted that the patient has significant decline in his alertness was not able to cooperate with physical therapy and this was dramatic change so they sent him to our hospital, in the ER CT scan have shown that there is hemologic changes and neurosurgery was consulted to consider this residual effect of the recent hemorrhagic stroke and no active bleed is there and recommends continuing conservative management, we need to rule out stroke, initial CT perfusion scan of the neck and CT angiogram of the head and neck is not impressive of major occlusion of large blood vessel with some element of mild ICA stenosis bilateral.  Testing for COVID in the ER came positive but patient is saturating well in room air.  I encountered the patient today, his granddaughter was not around, but patient is opening his eyes but still disoriented not cooperative to answer the questions saturating well on room air           Review of Systems   Cannot be obtained as patient is not communicating     Objective    Temp:  [97.3 °F (36.3 °C)-99.5 °F (37.5 °C)] 98.7 °F (37.1 °C)  Heart Rate:  [] 91  Resp:  [16-18] 16  BP: (139-160)/() 141/78  Physical Exam  General patient is confused obtunded not responding to the questions  Neck no elevated jugular venous pressure, no cervical adenopathy  Chest no tachypnea saturating well on room air chest clear  air entry with no rhonchi or crepitations  Heart regular rate and rhythm with no murmurs or gallop  Abdomen soft with no tenderness no rigidity no organomegaly        Results Review:  I have  reviewed the labs, radiology results, and diagnostic studies.     Laboratory Data:          Results from last 7 days   Lab Units 09/01/23  0454 08/31/23  1815 08/30/23  1030   WBC 10*3/mm3 8.62 9.93 9.43   HEMOGLOBIN g/dL 15.4 15.0 15.1   HEMATOCRIT % 49.2 46.5 47.6   PLATELETS 10*3/mm3 149 170 199                Results from last 7 days   Lab Units 09/01/23  0454 08/31/23  1928 08/30/23  1030   SODIUM mmol/L 133* 134* 139   POTASSIUM mmol/L 4.3 4.6 4.2   CHLORIDE mmol/L 98 96* 98   CO2 mmol/L 22.0 26.0 28.0   BUN mg/dL 18 22 24*   CREATININE mg/dL 0.77 0.82 0.81   CALCIUM mg/dL 9.9 9.9 10.5   BILIRUBIN mg/dL 0.5 0.5 0.5   ALK PHOS U/L 84 87 92   ALT (SGPT) U/L 16 19 17   AST (SGOT) U/L 18 18 14   GLUCOSE mg/dL 185* 286* 268*         Culture Data:   No results found for: BLOODCX, URINECX, WOUNDCX, MRSACX, RESPCX, STOOLCX     Radiology Data:   Imaging Results (Last 24 Hours)         Procedure Component Value Units Date/Time     XR Chest 1 View [[id number]] Collected: 08/31/23 1857       Updated: 08/31/23 1900     Narrative:       EXAMINATION: Chest 1 view 08/31/2023     HISTORY: Acute stroke protocol.     FINDINGS: Upright frontal projection of the chest demonstrates mild  cardiomegaly. The thoracic aorta and great vessels are ectatic. Lungs  are fully expanded and clear. No consolidative pneumonia or effusion.        Impression:       1. No acute disease.  This report was finalized on 08/31/2023 18:57 by Dr. Martinez Gonzales MD.     CT CEREBRAL PERFUSION WITH & WITHOUT CONTRAST [[id number]] Collected: 08/31/23 1838       Updated: 08/31/23 1845     Narrative:       Indication: Confusion     Exam:      1. Perfusion CT is performed to acquire images tracking the temporal  course of iodinated contrast material passing through the cerebral  circulation. Perfusion parameters, such as cerebral blood flow (CBF),  cerebral blood volume (CBV), mean transit time (MTT), etc. are  calculated by [employer] with additional provided  perfusion maps and  estimated stroke volumes.  2. Automated exposure control (AEC) protocols are utilized on the  scanner to ensure dose lowered technique.      Comparison: Noncontrast CT brain and brain angiogram dated 8/31/2023  5:42 PM CDT     Findings:     Abnormal perfusion exam, with size and distribution of the perfusion  abnormality described below.     Distribution: Perfusion abnormality involves the left basal ganglia and  a site of recent intra-axial hemorrhage..     Tmax >6.0 seconds volume: 11 ml     CBF < 30% volume: 0 ml     Mismatch volume: 11 ml      Mismatch ratio: Infinite        Impression:       Impression:  1. There is noted to be delayed transit time of contrast in the left  basal ganglia at the site of a recent intra-axial hemorrhage. There is  associated edema and some mass effect with subtle effacement of the  frontal horn of the left lateral ventricle at this site related to the  recent hemorrhage likely accounting for the delayed perfusion. I do not  see evidence of core infarct or significant penumbra to suggest large  vessel occlusion/acute ischemia.  This report was finalized on 08/31/2023 18:42 by Dr. Martinez Gonzales MD.     CT Angiogram Head w AI Analysis of LVO [[id number]] Collected: 08/31/23 1818       Updated: 08/31/23 1841     Narrative:       EXAMINATION: CT angiogram of the neck and head with contrast with AI  analysis for LVO. 08/31/2023     HISTORY: Acute stroke suspected. Neurologic deficit.     DOSE: 414 mGycm. All CT scans are performed using dose optimization  techniques as appropriate to the performed exam and including at least  one of the following: Automated exposure control, adjustment of the mA  and/or kV according to size, and the use of the iterative reconstruction  technique..     FINDINGS: Multiple contiguous axial images are obtained from the aortic  arch through the vertex following intravenous contrast infusion with  reformatted images obtained in the  sagittal and coronal projections from  the original data set. MIPS are also obtained.     There is atheromatous calcification of the thoracic aorta and proximal  great vessels without evidence of aneurysm or dissection. No rate  limiting stenosis at the origin of the great vessels. There are  emphysematous changes of the lungs. The thyroid is homogeneous in  density without evidence of enlargement or nodularity. Level of the true  and false cords is unremarkable. No pathologically enlarged cervical  chain or posterior triangle lymphadenopathy is present.     The origin of the vertebral arteries are widely patent. The left  vertebral artery is dominant. There is mild calcific plaquing involving  the intracranial segment of the left vertebral artery without rate  limiting stenosis. Both vertebral arteries are otherwise widely patent  to the basilar artery.     There is scattered plaquing involving the right common carotid artery  without evidence of rate limiting stenosis. There is calcific plaquing  involving the carotid bifurcation with involvement of the carotid bulb  and proximal ICA. This results in an approximate 40% cross-sectional  stenosis within the proximal right ICA. The right ICA is tortuous but  otherwise widely patent to the skull base.     Examination of the left common carotid artery demonstrates scattered  plaquing without rate limiting stenosis. There is calcific plaquing  involving the carotid bulb and proximal left ICA with a less than 25%  cross-sectional stenosis at the level of the left carotid bulb. The left  ICA is tortuous.     Examination of the [place] including AI analysis for LVO is  also performed including MIPS.     The left vertebral artery is dominant. Mild calcific plaquing involving  the intracranial segment of the left vertebral artery does not result in  significant stenosis. Both vertebral arteries are patent to the basilar  artery. The posterior inferior cerebellar  arteries are normal in  appearance. There is mild stenosis involving the mid segment of the  basilar artery. The superior cerebellar artery are widely patent. There  is a persistent fetal origin of the right posterior cerebral artery. The  left posterior cerebral artery emanates from the distal basilar artery  with a patent posterior communicating artery also present and  contributing to the posterior circulation.     Both distal extracranial ICAs are widely patent. There is calcific  plaquing involving the petrous segment of the right ICA without rate  limiting stenosis. There is calcific plaquing involving the cavernous  segment of both ICAs with mild associated stenosis. There is also  calcific plaquing involving the distal cavernous and proximal  supraclinoid segment of both ICAs with mild associated bilateral  stenosis. Both vessels are patent to the terminus. The anterior and  middle cerebral arteries are normal in appearance with no evidence of  intraluminal thrombus or berry aneurysm. No focal steno-occlusive  disease. Previously described focus of linear density within the mesial  left temporal lobe appears to be separable from the left middle cerebral  artery branches        Impression:       1.. Calcific plaquing involving the intracranial segment of the left  vertebral artery without evidence of rate limiting stenosis. There is a  small short segment stenosis within the basilar artery above the  anterior inferior cerebellar artery origin. The superior cerebellar  arteries are widely patent. The right posterior cerebral artery  represents a persistent fetal origin from the anterior circulation. The  left PCA emanates from the basilar artery but also has a contribution  from the left posterior communicating artery.  2. Calcific plaquing involving the petrous segment of the right ICA as  well as the cavernous and proximal supraclinoid segment of both ICAs  with mild associated stenosis. Both ICAs are patent  to the terminus. The  anterior and middle cerebral arteries are normal in appearance with no  intraluminal thrombus or berry aneurysm demonstrated. No focal stenosis  is appreciated within the anterior or middle cerebral arteries.  3. 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  This is slightly more prominent on the right than on the left with an  approximate 40% cross-sectional stenosis within the proximal right ICA  and a less than 25% cross-sectional stenosis at the level of the left  carotid bulb. The more distal extracranial ICAs are tortuous but  otherwise patent.  This report was finalized on 08/31/2023 18:37 by Dr. Martinez Gonzales MD.     CT Angiogram Neck [[id number]] Collected: 08/31/23 1818       Updated: 08/31/23 1841     Narrative:       EXAMINATION: CT angiogram of the neck and head with contrast with AI  analysis for LVO. 08/31/2023     HISTORY: Acute stroke suspected. Neurologic deficit.     DOSE: 414 mGycm. All CT scans are performed using dose optimization  techniques as appropriate to the performed exam and including at least  one of the following: Automated exposure control, adjustment of the mA  and/or kV according to size, and the use of the iterative reconstruction  technique..     FINDINGS: Multiple contiguous axial images are obtained from the aortic  arch through the vertex following intravenous contrast infusion with  reformatted images obtained in the sagittal and coronal projections from  the original data set. MIPS are also obtained.     There is atheromatous calcification of the thoracic aorta and proximal  great vessels without evidence of aneurysm or dissection. No rate  limiting stenosis at the origin of the great vessels. There are  emphysematous changes of the lungs. The thyroid is homogeneous in  density without evidence of enlargement or nodularity. Level of the true  and false cords is unremarkable. No pathologically enlarged cervical  chain or  posterior triangle lymphadenopathy is present.     The origin of the vertebral arteries are widely patent. The left  vertebral artery is dominant. There is mild calcific plaquing involving  the intracranial segment of the left vertebral artery without rate  limiting stenosis. Both vertebral arteries are otherwise widely patent  to the basilar artery.     There is scattered plaquing involving the right common carotid artery  without evidence of rate limiting stenosis. There is calcific plaquing  involving the carotid bifurcation with involvement of the carotid bulb  and proximal ICA. This results in an approximate 40% cross-sectional  stenosis within the proximal right ICA. The right ICA is tortuous but  otherwise widely patent to the skull base.     Examination of the left common carotid artery demonstrates scattered  plaquing without rate limiting stenosis. There is calcific plaquing  involving the carotid bulb and proximal left ICA with a less than 25%  cross-sectional stenosis at the level of the left carotid bulb. The left  ICA is tortuous.     Examination of the [place] including AI analysis for LVO is  also performed including MIPS.     The left vertebral artery is dominant. Mild calcific plaquing involving  the intracranial segment of the left vertebral artery does not result in  significant stenosis. Both vertebral arteries are patent to the basilar  artery. The posterior inferior cerebellar arteries are normal in  appearance. There is mild stenosis involving the mid segment of the  basilar artery. The superior cerebellar artery are widely patent. There  is a persistent fetal origin of the right posterior cerebral artery. The  left posterior cerebral artery emanates from the distal basilar artery  with a patent posterior communicating artery also present and  contributing to the posterior circulation.     Both distal extracranial ICAs are widely patent. There is calcific  plaquing involving the  petrous segment of the right ICA without rate  limiting stenosis. There is calcific plaquing involving the cavernous  segment of both ICAs with mild associated stenosis. There is also  calcific plaquing involving the distal cavernous and proximal  supraclinoid segment of both ICAs with mild associated bilateral  stenosis. Both vessels are patent to the terminus. The anterior and  middle cerebral arteries are normal in appearance with no evidence of  intraluminal thrombus or berry aneurysm. No focal steno-occlusive  disease. Previously described focus of linear density within the mesial  left temporal lobe appears to be separable from the left middle cerebral  artery branches        Impression:       1.. Calcific plaquing involving the intracranial segment of the left  vertebral artery without evidence of rate limiting stenosis. There is a  small short segment stenosis within the basilar artery above the  anterior inferior cerebellar artery origin. The superior cerebellar  arteries are widely patent. The right posterior cerebral artery  represents a persistent fetal origin from the anterior circulation. The  left PCA emanates from the basilar artery but also has a contribution  from the left posterior communicating artery.  2. Calcific plaquing involving the petrous segment of the right ICA as  well as the cavernous and proximal supraclinoid segment of both ICAs  with mild associated stenosis. Both ICAs are patent to the terminus. The  anterior and middle cerebral arteries are normal in appearance with no  intraluminal thrombus or berry aneurysm demonstrated. No focal stenosis  is appreciated within the anterior or middle cerebral arteries.  3. 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  This is slightly more prominent on the right than on the left with an  approximate 40% cross-sectional stenosis within the proximal right ICA  and a less than 25% cross-sectional stenosis at the level of the  left  carotid bulb. The more distal extracranial ICAs are tortuous but  otherwise patent.  This report was finalized on 08/31/2023 18:37 by Dr. Martinez Gonzales MD.     CT Head Without Contrast Stroke Protocol [[id number]] Collected: 08/31/23 1751       Updated: 08/31/23 1807     Narrative:       CT HEAD WO CONTRAST STROKE PROTOCOL- 8/31/2023 5:40 PM CDT     HISTORY: Neuro deficit, acute, stroke suspected     COMPARISON: None      DLP: 748 mGy cm. All CT scans are performed using dose optimization  techniques as appropriate to the performed exam and including at least  one of the following: Automated exposure control, adjustment of the mA  and/or kV according to size, and the use of the iterative reconstruction  technique.     TECHNIQUE: Serial axial tomographic images of the brain were obtained  without the use of intravenous contrast.      FINDINGS:   The midline structures are nondisplaced. There is diffuse atrophy of the  brain with prominence of the subarachnoid spaces and ventricular  enlargement. There is evidence of a previous posterior division right  MCA territory infarct with encephalomalacia within the right temporal  and parietal lobe. A remote infarct involving the right basal ganglia  and left thalamus are also present.     There is a subtle focus of increased density within the left basal  ganglia. This has ill-defined margins measuring approximately 2.6 x 2.0  cm in size with subtle effacement of the frontal horn of the left  lateral ventricle. I suspect this may represent a focus of subacute  hemorrhage. There is evidence of intraventricular hemorrhage with blood  layering dependently within the posterior horn of both lateral  ventricles. No evidence of acute intra-axial or extra-axial hemorrhage.  There is also evidence of a previous right frontal lobe infarct or  posttraumatic change with encephalomalacia.     Noted on image 13 of series 5 and image 12 of series 9 is focal more  linear  hyperdensity within the mesial left temporal lobe. This is in  close proximity to the M2 segments of the left MCA and could potentially  represent some clot within one of these vessels. CT angiography may be  helpful for better delineation.     The orbits are unremarkable.     The visualized paranasal sinuses and mastoid air cells are normally  aerated.        Impression:       1.. Subtle hyperdensity within the left basal ganglia with some mass  effect and mild effacement of the frontal horn of the left lateral  ventricle. Radiographically I would favor that this represents a focus  of subacute intra-axial hemorrhage. There is evidence of blood layering  dependently within the posterior horn of both lateral ventricles. No  evidence of acute appearing intra or extra-axial hemorrhage although the  blood layering within the posterior horn of the left lateral ventricle  is relatively hyperdense.  2. Focal hyperdensity which is more linear distribution within the  mesial left temporal lobe. This could potentially represent some  hyperdense clot within a left M2 vessel versus an additional small focus  of intra-axial hemorrhage. This is different in density in comparison  with the adjacent hemorrhage in the left basal ganglia. CT angiography  may be helpful for further characterization.  3. Diffuse atrophy and small vessel disease. Evidence of previous  infarcts involving the right frontal lobe as well as the right temporal  and parietal lobe in the MCA distribution. Remote right basal ganglia  and left thalamic lacunar infarcts are also present.     This report was finalized on 08/31/2023 18:03 by Dr. Martinez Gonzales MD.                I have reviewed the patient's current medications.      Assessment/Plan   Assessment       Active Hospital Problems     Diagnosis      **AMS (altered mental status)      COVID-19 virus detected      Dementia      Hyperlipidemia      Brain bleed      Benign essential HTN      DM2  (diabetes mellitus, type 2)           Treatment Plan  COVID-19 detected, patient is saturating well on room air, to continue follow-up of oxygen saturations, currently on zinc and vitamin C    Brain bleed residual effect of recent hemorrhagic stroke patient off Plavix, seen by neurosurgery and recommends to continue conservative treatment no intervention needed at this point    To rule out stroke, CT perfusion scan of the head is negative for major occlusion of the large blood vessel, follow-up of MRI of the brain has ruled out stroke, neurology is following and added aspirin for history of ischemic stroke    Encephalopathy possibly related to underlying cognitive dysfunctions with possible dementia and recent hemorrhagic stroke, in addition to recent COVID action    Diabetes mellitus on insulin regimen for appropriate control of the blood sugar    Hypertension, blood pressure reasonably controlled    Feeding patient has not passed swallowing test, speech therapy following, on IV fluids     Medical Decision Making  Number and Complexity of problems: High complexity  Differential Diagnosis: Rule out stroke     Conditions and Status             MDM Data  External documents reviewed: No  Cardiac tracing (EKG, telemetry) interpretation: No  Radiology interpretation: Yes  Labs reviewed: Yes  Any tests that were considered but not ordered: Yes      Decision rules/scores evaluated (example [id number], [place], etc): No     Discussed with: With granddaughter     Care Planning  Shared decision making: With granddaughter  Code status and discussions: With granddaughter     Disposition  Social Determinants of Health that impact treatment or disposition: Recent diagnosis of COVID to follow oxygen saturation and to follow-up of MRI of the brain to rule out stroke and follow-up of neurology consult and follow-up of improvement of the cognitive status  I expect the patient to be discharged to skilled nursing facility in 2 to 3  days days.      Electronically signed by Guanako Mock MD, 09/01/23, 09:31 CDT.

## 2023-09-02 NOTE — PROGRESS NOTES
Neurology Progress Note      Chief Complaint:  f/u stroke  Length of Stay:  0   Subjective     Subjective:  Sitting up in bed. Grand daughter at bedside. He is awake and alert. He is on room air. He has no new c/o.  OT/PT is recommending discharge to SNF.  Patient noted at 0630 on cardiac telemetry to have bradycardia  30s with 2nd degree AV block for 5 beats and then returned to sinus 80s.  Asymptomatic.     MRI brain  personally viewed by me showed left basal ganglia intraparenchymal hemorrhage with trace amount intraventricular hemorrhage. Extensive atrophy and small vessel disease. There is old right parietal stroke. No new acute stroke or hemorrhage.   Results discussed with patient and grand daughter.     Medications:  Current Facility-Administered Medications   Medication Dose Route Frequency Provider Last Rate Last Admin    acetaminophen (TYLENOL) tablet 650 mg  650 mg Oral Q4H PRN Zoran Sloan DO        Or    acetaminophen (TYLENOL) suppository 650 mg  650 mg Rectal Q4H PRN Zoran Sloan DO        ascorbic acid (VITAMIN C) tablet 500 mg  500 mg Oral Daily Zoran Sloan DO   500 mg at 09/02/23 0910    atorvastatin (LIPITOR) tablet 20 mg  20 mg Oral Nightly Zoran Sloan DO   20 mg at 09/01/23 2102    dextrose (D50W) (25 g/50 mL) IV injection 25 g  25 g Intravenous Q15 Min PRN Zoran Sloan DO        dextrose (GLUTOSE) oral gel 15 g  15 g Oral Q15 Min PRN Zoran Sloan DO        glucagon (human recombinant) (GLUCAGEN DIAGNOSTIC) injection 1 mg  1 mg Intramuscular Q15 Min PRN Zoran Sloan DO        Insulin Lispro (humaLOG) injection 2-7 Units  2-7 Units Subcutaneous 4x Daily AC & at Bedtime Zoran Sloan DO   2 Units at 09/02/23 0910    labetalol (NORMODYNE,TRANDATE) injection 10 mg  10 mg Intravenous Q2H PRN Zoran Sloan DO        labetalol (NORMODYNE,TRANDATE) injection 5 mg  5 mg Intravenous Once Loida Antunez,         memantine (NAMENDA) tablet 2.5 mg  2.5 mg  Oral Q12H NathaliaZoran, DO   2.5 mg at 09/02/23 0910    ondansetron (ZOFRAN) injection 4 mg  4 mg Intravenous Q6H PRN NathaliaZoran, DO        sodium chloride 0.9 % flush 10 mL  10 mL Intravenous PRN TulioLoida Nida, DO        sodium chloride 0.9 % flush 10 mL  10 mL Intravenous Q12H NathaliaZoran, DO   10 mL at 09/02/23 0911    sodium chloride 0.9 % flush 10 mL  10 mL Intravenous PRN NathaliaZoran,         sodium chloride 0.9 % infusion 40 mL  40 mL Intravenous PRN Nathalai, Zoran BLACK, DO        zinc sulfate (ZINCATE) capsule 220 mg  220 mg Oral Daily Zoran Sloan DO   220 mg at 09/02/23 0910             Objective      Vital Signs  Temp:  [98.1 °F (36.7 °C)-98.4 °F (36.9 °C)] 98.4 °F (36.9 °C)  Heart Rate:  [53-80] 69  Resp:  [16-18] 18  BP: (122-151)/(59-75) 151/69    Physical Exam:  General Exam:  Head:  Normocephalic, atraumatic  HEENT:  Neck supple  Fundoscopic Exam:  No signs of disc edema  CVS:  Regular rate and rhythm.  No murmurs  Carotid Examination:  No bruits  Lungs:  Clear to auscultation  Abdomen:  Nontender, Nondistended  Extremities:  No signs of peripheral edema  Skin:  No rashes     Neurologic Exam:     Mental Status:    -Awake, Alert, Oriented X to person and when given choices that he is in the hospital but could not state the name. Not able to state month or year. Could not name president of [place].   -No word finding difficulties  -No aphasia  -No dysarthria  -Follows simple  commands  Palmomental reflex illicited     CN II:  Visual fields full.  Pupils equally reactive to light  CN III, IV, VI:  Extraocular Muscles full with no signs of nystagmus  CN V:  Facial sensory is symmetric with no asymmetries.  CN VII:  Facial motor symmetric  CN VIII:  Gross hearing intact bilaterally  CN IX:  Palate elevates symmetrically  CN X:  Palate elevates symmetrically  CN XI:  Shoulder shrug symmetric  CN XII:  Tongue is midline on protrusion     Motor: (strength out of 5:  1= minimal  movement, 2 = movement in plane of gravity, 3 = movement against gravity, 4 = movement against some resistance, 5 = full strength)     -Right Upper Ext: Proximal: 5 Distal: 5  -Left Upper Ext: Proximal: 5   Distal: 5     -Right Lower Ext: Proximal: 5 Distal: 5  -Left Lower Ext: Proximal: 5   Distal: 5     DTR:  -Right              Biceps: 2+       Triceps: 2+      Brachioradialis: 2+              Patella: 2+       Ankle: 2+         Neg Babinski  -Left              Biceps: 2+       Triceps: 2+      Brachioradialis: 2+              Patella: 2+       Ankle: 2+         Neg Babinski     Sensory:  -Intact to light touch, pinprick, temperature, pain, and proprioception     Coordination:  -Finger to nose intact  -Heel to shin intact  -No ataxia     Gait  -not tested for safety reasons.    Pertinent Neuro Exam:    Last nurse assessment:  Interval: 2 hrs posttreatment (Dr Stewart is aware of unability to obtain NIH assmt, pt is not cooperative)  1a. Level of Consciousness: 1-->Not alert, but arousable by minor stimulation to obey, answer, or respond  1b. LOC Questions: 2-->Answers neither question correctly  1c. LOC Commands: 2-->Performs neither task correctly  2. Best Gaze: 0-->Normal  3. Visual: 0-->No visual loss  4. Facial Palsy: 0-->Normal symmetrical movements  5a. Motor Arm, Left: 0-->No drift, limb holds 90 (or 45) degrees for full 10 secs  5b. Motor Arm, Right: 0-->No drift, limb holds 90 (or 45) degrees for full 10 secs  6a. Motor Leg, Left: 2-->Some effort against gravity, leg falls to bed by 5 secs, but has some effort against gravity  6b. Motor Leg, Right: 2-->Some effort against gravity, leg falls to bed by 5 secs, but has some effort against gravity  7. Limb Ataxia: 0-->Absent  8. Sensory: 0-->Normal, no sensory loss  9. Best Language: 0-->No aphasia, normal  10. Dysarthria: 0-->Normal  11. Extinction and Inattention (formerly Neglect): 0-->No abnormality    Total (NIH Stroke Scale): 9       Results Review:     CT Angiogram Neck    Result Date: 8/31/2023  1.. Calcific plaquing involving the intracranial segment of the left vertebral artery without evidence of rate limiting stenosis. There is a small short segment stenosis within the basilar artery above the anterior inferior cerebellar artery origin. The superior cerebellar arteries are widely patent. The right posterior cerebral artery represents a persistent fetal origin from the anterior circulation. The left PCA emanates from the basilar artery but also has a contribution from the left posterior communicating artery. 2. Calcific plaquing involving the petrous segment of the right ICA as well as the cavernous and proximal supraclinoid segment of both ICAs with mild associated stenosis. Both ICAs are patent to the terminus. The anterior and middle cerebral arteries are normal in appearance with no intraluminal thrombus or berry aneurysm demonstrated. No focal stenosis is appreciated within the anterior or middle cerebral arteries. 3. 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 This is slightly more prominent on the right than on the left with an approximate 40% cross-sectional stenosis within the proximal right ICA and a less than 25% cross-sectional stenosis at the level of the left carotid bulb. The more distal extracranial ICAs are tortuous but otherwise patent. This report was finalized on 08/31/2023 18:37 by Dr. Martinez Gonzales MD.    XR Chest 1 View    Result Date: 8/31/2023  1. No acute disease. This report was finalized on 08/31/2023 18:57 by Dr. Martinez Gonzales MD.    CT Head Without Contrast Stroke Protocol    Result Date: 8/31/2023  1.. Subtle hyperdensity within the left basal ganglia with some mass effect and mild effacement of the frontal horn of the left lateral ventricle. Radiographically I would favor that this represents a focus of subacute intra-axial hemorrhage. There is evidence of blood layering dependently within the  posterior horn of both lateral ventricles. No evidence of acute appearing intra or extra-axial hemorrhage although the blood layering within the posterior horn of the left lateral ventricle is relatively hyperdense. 2. Focal hyperdensity which is more linear distribution within the mesial left temporal lobe. This could potentially represent some hyperdense clot within a left M2 vessel versus an additional small focus of intra-axial hemorrhage. This is different in density in comparison with the adjacent hemorrhage in the left basal ganglia. CT angiography may be helpful for further characterization. 3. Diffuse atrophy and small vessel disease. Evidence of previous infarcts involving the right frontal lobe as well as the right temporal and parietal lobe in the MCA distribution. Remote right basal ganglia and left thalamic lacunar infarcts are also present.  This report was finalized on 08/31/2023 18:03 by Dr. Martinez Gonzales MD.    CT Angiogram Head w AI Analysis of LVO    Result Date: 8/31/2023  1.. Calcific plaquing involving the intracranial segment of the left vertebral artery without evidence of rate limiting stenosis. There is a small short segment stenosis within the basilar artery above the anterior inferior cerebellar artery origin. The superior cerebellar arteries are widely patent. The right posterior cerebral artery represents a persistent fetal origin from the anterior circulation. The left PCA emanates from the basilar artery but also has a contribution from the left posterior communicating artery. 2. Calcific plaquing involving the petrous segment of the right ICA as well as the cavernous and proximal supraclinoid segment of both ICAs with mild associated stenosis. Both ICAs are patent to the terminus. The anterior and middle cerebral arteries are normal in appearance with no intraluminal thrombus or berry aneurysm demonstrated. No focal stenosis is appreciated within the anterior or middle cerebral  arteries. 3. 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 This is slightly more prominent on the right than on the left with an approximate 40% cross-sectional stenosis within the proximal right ICA and a less than 25% cross-sectional stenosis at the level of the left carotid bulb. The more distal extracranial ICAs are tortuous but otherwise patent. This report was finalized on 08/31/2023 18:37 by Dr. Martinez Gonzales MD.    CT CEREBRAL PERFUSION WITH & WITHOUT CONTRAST    Result Date: 8/31/2023  Impression: 1. There is noted to be delayed transit time of contrast in the left basal ganglia at the site of a recent intra-axial hemorrhage. There is associated edema and some mass effect with subtle effacement of the frontal horn of the left lateral ventricle at this site related to the recent hemorrhage likely accounting for the delayed perfusion. I do not see evidence of core infarct or significant penumbra to suggest large vessel occlusion/acute ischemia. This report was finalized on 08/31/2023 18:42 by Dr. Martinez Gonzales MD.      Assessment/Plan   Patient and wife had been living independently in [place] until he had his last stroke 8/2023. He had a stroke in April and June of this year with minimal late affect and then while gathering belongings from his home in [place] he collapsed and was taken to local hospital there and found to have intracranial hemorrhage on left. He has had some right sided weakness then and was transferred to [facility] for rehab. He ambulates with walker. At time of ICH he was taking ASA and Plavix and both had been d/c.  PMH also includes DM, HTN, HLD, cognitive impairment although grand daughter states he lived independently until 8/2023 and unaware of cognitive issues.       Yesterday, while grand daughter was visiting patient they were walking back to his room and he suddenly stopped speaking and stopped walking. He may have stared off but no  rhythmic jerking or bowel/bladder incontinence noted. She noted he was not making sense when speaking. He was brought to [facility] ED for evaluation. CT head showed subacute ICH left basal gangila. CTA head and neck showed no significant stenosis or occlusion.  Patient found to be Covid positive. He denies HA but seems to hold his head.      Hospital Problem List      AMS (altered mental status)    COVID-19 virus detected    Dementia    Hyperlipidemia    Brain bleed    Benign essential HTN    DM2 (diabetes mellitus, type 2)    Impression:  Episode of altered speech and staring. History of 2 ischemic stroke 4/2023 and 6/2023 and ICH 8/2203. If concern for new ischemic stroke IV TNK not considered as patient  has had stroke in the last 3 months and also presence of subacute ICA on CT head. Patient has been off antiplatelet since 8/2023.   Recent strokes as above.  Covid positive  Cognitive impairment. I suspect patient has been very high functioning and now impairment has revealed with multiple hospitalizations for stroke and now Covid positive  DM with hyperglycemia. A1C 8.0        Plan:  Ok to resume ASA 81 mg daily for secondary stroke prevention.   No new ischemic stroke seen on MRI brain so no need for additional storke work up.  Event on day of admission likely exacerbation of underlying cognitive impairment exacerbated by recent strokes and ICH and now Covid positive as well as patient has had several hospitalizations over the last several months and had been just placed in [place] for continued rehab. I expect his cognitive function to improve as Covid resolves.   Lipitor 20 mg daily for now given patient advanced age and LDL is at goal less than 70.  Continue Namenda.  Patient will need outpatient f/u with neurology either here or [place] as he had seen neurology there for prior strokes however, grand daughter discharge plan was for patient and his wife to live with her when he was discharged from  SNF. Grand daughter lives in [place]. F/u neurology 6 weeks.   Neurology will sign off but re consult if needed.   Outpatient A1C goal less than 6.5.      Medical Decision Making    Number/Complexity of Problems  Moderate  1 undiagnosed new problem with uncertain prognosis -   1 acute illness with systemic symptoms -   High  1 acute or chronic illness that pose a threat to life/body function -   high     MDM Data  Moderate - 1/3 categories  Extensive - 2/3 categories    Category 1: 3 of the following  Review of external notes  Review of results  Ordering of each unique test  Independent historian  Category 2:  Independent interpretation of test (ex: imaging)  Category 3:  Discussion of management with another provider    extensive     Treatment Plan  Moderate - Prescription Drug management  High  Drug therapy requiring intensive monitoring for toxicity  Decision regarding hospitalization or escalation of care  De-escalate care/DNR decisions  high Awilda Fuentes, APRN  09/02/23  11:26 CDT

## 2023-09-03 LAB
GLUCOSE BLDC GLUCOMTR-MCNC: 177 MG/DL (ref 70–130)
GLUCOSE BLDC GLUCOMTR-MCNC: 255 MG/DL (ref 70–130)
GLUCOSE BLDC GLUCOMTR-MCNC: 257 MG/DL (ref 70–130)
GLUCOSE BLDC GLUCOMTR-MCNC: 289 MG/DL (ref 70–130)

## 2023-09-03 PROCEDURE — 97116 GAIT TRAINING THERAPY: CPT

## 2023-09-03 PROCEDURE — 92526 ORAL FUNCTION THERAPY: CPT

## 2023-09-03 PROCEDURE — 63710000001 INSULIN LISPRO (HUMAN) PER 5 UNITS: Performed by: INTERNAL MEDICINE

## 2023-09-03 PROCEDURE — 97110 THERAPEUTIC EXERCISES: CPT

## 2023-09-03 PROCEDURE — 82948 REAGENT STRIP/BLOOD GLUCOSE: CPT

## 2023-09-03 RX ADMIN — MEMANTINE HYDROCHLORIDE 2.5 MG: 5 TABLET, FILM COATED ORAL at 08:53

## 2023-09-03 RX ADMIN — OXYCODONE HYDROCHLORIDE AND ACETAMINOPHEN 500 MG: 500 TABLET ORAL at 08:53

## 2023-09-03 RX ADMIN — ASPIRIN 81 MG: 81 TABLET, CHEWABLE ORAL at 08:53

## 2023-09-03 RX ADMIN — ZINC SULFATE 220 MG (50 MG) CAPSULE 220 MG: CAPSULE at 08:53

## 2023-09-03 RX ADMIN — INSULIN LISPRO 4 UNITS: 100 INJECTION, SOLUTION INTRAVENOUS; SUBCUTANEOUS at 13:42

## 2023-09-03 RX ADMIN — INSULIN LISPRO 2 UNITS: 100 INJECTION, SOLUTION INTRAVENOUS; SUBCUTANEOUS at 08:53

## 2023-09-03 RX ADMIN — Medication 10 ML: at 08:53

## 2023-09-03 RX ADMIN — ATORVASTATIN CALCIUM 20 MG: 10 TABLET, FILM COATED ORAL at 20:53

## 2023-09-03 RX ADMIN — Medication 10 ML: at 20:53

## 2023-09-03 RX ADMIN — INSULIN LISPRO 4 UNITS: 100 INJECTION, SOLUTION INTRAVENOUS; SUBCUTANEOUS at 20:55

## 2023-09-03 RX ADMIN — INSULIN LISPRO 4 UNITS: 100 INJECTION, SOLUTION INTRAVENOUS; SUBCUTANEOUS at 16:25

## 2023-09-03 RX ADMIN — MEMANTINE HYDROCHLORIDE 2.5 MG: 5 TABLET, FILM COATED ORAL at 20:53

## 2023-09-03 NOTE — CASE MANAGEMENT/SOCIAL WORK
Continued Stay Note   [place]     Patient Name: Bert Pereira  MRN: [id number]  Today's Date: 9/3/2023    Admit Date: 8/31/2023    Plan: Home Health   Discharge Plan       Row Name 09/03/23 1247       Plan    Plan Home Health    Plan Comments Patient's daughter has decided she would like for patient to discharge home with home health instead of return to [facility]. JENNIFFER can arrange home health at discharge when MD provides order.      Row Name 09/03/23 1027       Plan    Plan [facility]    Plan Comments JENNIFFER contacted Luisa in admissions at [facility] at 9:34AM to see if patient can return. No response yet. JENNIFFER called facility and spoke to Jacque and she says patient cannot return without approval from Luisa. Jacque says she can also contact Luisa to ask her to return JENNIFFER call. Will await response re return to SNF.                      Expected Discharge Date and Time       Expected Discharge Date Expected Discharge Time    Sep 4, 2023               [place]

## 2023-09-03 NOTE — THERAPY TREATMENT NOTE
Acute Care - Speech Language Pathology   Swallow Treatment Note [facility]     Patient Name: Bert Pereira  : 1945  MRN: [id number]  Today's Date: 9/3/2023               Admit Date: 2023  Assisted pt with lunch tray of regular solids, soft solid, pureed, and thin consistencies. Initially SLP fed pt as he made no attempt to self-feed. The PCA reported the pt was tray set up assistance only. With further prompting the pt did begin to self-feed. No overt s/s of aspiration were observed. He had prolonged, but adequate mastication and good clearance of oral residue. Ok to continue with regular solids and thin liquids. SLP will continue to follow to ensure diet tolerance.   Aniceto Pual, CCC-SLP 9/3/2023 13:02 CDT    Visit Dx:     ICD-10-CM ICD-9-CM   1. Altered mental status, unspecified altered mental status type  R41.82 780.97   2. Impaired mobility [Z74.09 (ICD-10-CM)]  Z74.09 799.89   3. Dysphagia, pharyngeal phase  R13.13 787.23     Patient Active Problem List   Diagnosis    AMS (altered mental status)    COVID-19 virus detected    Dementia    Hyperlipidemia    Brain bleed    Benign essential HTN    DM2 (diabetes mellitus, type 2)     Past Medical History:   Diagnosis Date    Constipation, unspecified     Diabetes     Essential (primary) hypertension 2023    Hemiplegia, unspecified affecting right dominant side 2023    History of falling     Hyperlipidemia, unspecified 2023    Nontraumatic intracerebral hemorrhage, unspecified 2023    Personal history of transient ischemic attack (TIA), and cerebral infarction without residual deficits     Unspecified dementia, unspecified severity, without behavioral disturbance, psychotic disturbance, mood disturbance, and anxiety      History reviewed. No pertinent surgical history.    SLP Recommendation and Plan                                               Daily Summary of Progress (SLP): progress toward functional goals as expected  (09/03/23 1203)               Treatment Assessment (SLP): continued (09/03/23 1203)  Treatment Assessment Comments (SLP): Continue to follow (09/03/23 1203)  Plan for Continued Treatment (SLP): continue treatment per plan of care (09/03/23 1203)            Plan of Care Reviewed With: patient  Progress: no change  Outcome Evaluation: See note      SWALLOW EVALUATION (last 72 hours)       SLP Adult Swallow Evaluation       Row Name 09/03/23 1203 09/01/23 1211                Rehab Evaluation    Document Type therapy note (daily note)  -MB evaluation  -MD       Subjective Information no complaints  -MB no complaints  -MD       Patient Observations alert;cooperative  -MB lethargic  -MD       Patient/Family/Caregiver Comments/Observations No family present, enhanced isolation precautions  -MB grand daughter present  -MD       Patient Effort -- good  -MD       Symptoms Noted During/After Treatment -- none  -MD          General Information    Patient Profile Reviewed -- yes  -MD       Pertinent History Of Current Problem -- Patient admitted with Covid. Patient has had a recent brain bleed. History of dementia, HTN, DM, TIA, right sided hemiplegia, and hyperlipidemia.  -MD       Current Method of Nutrition -- NPO  -MD       Precautions/Limitations, Vision -- WFL with corrective lenses  -MD       Precautions/Limitations, Hearing -- WFL  -MD       Prior Level of Function-Communication -- unknown  -MD       Prior Level of Function-Swallowing -- no diet consistency restrictions  -MD       Plans/Goals Discussed with -- family;patient  -MD       Barriers to Rehab -- previous functional deficit  -MD       Patient's Goals for Discharge -- patient did not state  -MD          Pain    Additional Documentation Pain Scale: FACES Pre/Post-Treatment (Group)  -MB --          Pain Scale: Numbers Pre/Post-Treatment    Pretreatment Pain Rating -- 0/10 - no pain  -MD       Posttreatment Pain Rating -- 0/10 - no pain  -MD          Pain Scale:  FACES Pre/Post-Treatment    Pain: FACES Scale, Pretreatment 0-->no hurt  -MB --          Oral Motor Structure and Function    Dentition Assessment -- natural, present and adequate  -MD       Secretion Management -- WNL/WFL  -MD       Mucosal Quality -- moist, healthy  -MD       Gag Response -- WFL  -MD       Volitional Swallow -- OSVALDO  -MD          Oral Musculature and Cranial Nerve Assessment    Oral Motor General Assessment -- WF  -MD          General Eating/Swallowing Observations    Respiratory Support Currently in Use -- room air  -MD       Eating/Swallowing Skills -- fed by SLP  -MD       Positioning During Eating -- upright in bed  -MD       Utensils Used -- straw;spoon  -MD       Consistencies Trialed -- regular textures;pureed;thin liquids  -MD          Respiratory    Respiratory Status -- WFL;room air  -MD          Clinical Swallow Eval    Oral Prep Phase -- WFL  -MD       Oral Transit -- impaired  -MD       Pharyngeal Phase -- suspected pharyngeal impairment  -MD       Esophageal Phase -- unremarkable  -MD       Clinical Swallow Evaluation Summary -- see note  -MD          Oral Transit Concerns    Oral Transit Concerns -- delayed initiation of bolus transit  -MD       Delayed Intiation of Bolus Transit -- all consistencies  -MD       Oral Transit Concerns, Comment -- altered level of consciousness  -MD          Pharyngeal Phase Concerns    Pharyngeal Phase Concerns -- multiple swallows  -MD       Multiple Swallows -- all consistencies  -MD          SLP Evaluation Clinical Impression    SLP Swallowing Diagnosis -- mild;suspected pharyngeal dysphagia  -MD       Functional Impact -- risk of aspiration/pneumonia;risk of malnutrition;risk of dehydration  -MD       Rehab Potential/Prognosis, Swallowing -- good, to achieve stated therapy goals  -MD       Swallow Criteria for Skilled Therapeutic Interventions Met -- demonstrates skilled criteria  -MD          SLP Treatment Clinical Impressions    Treatment  Assessment (SLP) continued  -MB --       Treatment Assessment Comments (SLP) Continue to follow  -MB --       Daily Summary of Progress (SLP) progress toward functional goals as expected  -MB --       Barriers to Overall Progress (SLP) Cognitive status  -MB --       Plan for Continued Treatment (SLP) continue treatment per plan of care  -MB --          Recommendations    Therapy Frequency (Swallow) -- PRN  -MD       Predicted Duration Therapy Intervention (Days) -- 1 week  -MD       SLP Diet Recommendation -- regular textures;thin liquids  -MD       Recommended Diagnostics -- reassess via clinical swallow evaluation  -MD       Recommended Precautions and Strategies -- upright posture during/after eating;small bites of food and sips of liquid;alternate between small bites of food and sips of liquid;general aspiration precautions;assist with feeding  -MD       Oral Care Recommendations -- Oral Care BID/PRN  -MD       SLP Rec. for Method of Medication Administration -- meds whole;with puree  -MD       Monitor for Signs of Aspiration -- yes;notify SLP if any concerns  -MD       Anticipated Discharge Disposition (SLP) -- skilled nursing facility  -MD          Swallow Goals (SLP)    Swallow LTGs -- Patient will demonstrate functional swallow for  -MD       Swallow STGs -- diet tolerance goal selection (SLP);swallow compensatory strategies goal selection (SLP)  -MD       Diet Tolerance Goal Selection (SLP) -- Patient will tolerate trials of  -MD       Swallow Compensatory Strategies Goal Selection (SLP) -- swallow compensatory strategies, SLP goal 1  -MD          (LTG) Patient will demonstrate functional swallow for    Diet Texture (Demonstrate functional swallow) regular textures  -MB regular textures  -MD       Liquid viscosity (Demonstrate functional swallow) thin liquids  -MB thin liquids  -MD       [place] (Demonstrate functional swallow) independently (over 90% accuracy)  -MB independently (over 90% accuracy)   -MD       Time Frame (Demonstrate functional swallow) 1 week  -MB 1 week  -MD       Barriers (Demonstrate functional swallow) previous functional deficits  -MB previous functional deficits  -MD       Progress/Outcomes (Demonstrate functional swallow) continuing progress toward goal  -MB new goal  -MD          (STG) Patient will tolerate trials of    Consistencies Trialed (Tolerate trials) regular textures;thin liquids  -MB regular textures;thin liquids  -MD       Desired Outcome (Tolerate trials) without signs/symptoms of aspiration;with use of compensatory strategies (see comments)  -MB without signs/symptoms of aspiration;with use of compensatory strategies (see comments)  -MD       [place] (Tolerate trials) independently (over 90% accuracy)  -MB independently (over 90% accuracy)  -MD       Time Frame (Tolerate trials) 1 week  -MB 1 week  -MD       Progress/Outcomes (Tolerate trials) continuing progress toward goal  -MB new goal  -MD          (STG) Swallow Compensatory Strategies Goal 1 (SLP)    Activity (Swallow Compensatory Strategies/Techniques Goal 1, SLP) aspiration precautions;effortful swallow  -MB aspiration precautions;effortful swallow  -MD       [place]/Accuracy (Swallow Compensatory Strategies/Techniques Goal 1, SLP) independently (over 90% accuracy)  -MB independently (over 90% accuracy)  -MD       Time Frame (Swallow Compensatory Strategies/Techniques Goal 1, SLP) 1 week  -MB 1 week  -MD       Barriers (Swallow Compensatory Strategies/Techniques Goal 1, SLP) previous functional deficits  -MB previous functional deficits  -MD       Progress/Outcomes (Swallow Compensatory Strategies/Techniques Goal 1, SLP) continuing progress toward goal  -MB new goal  -MD                 User Key  (r) = Recorded By, (t) = Taken By, (c) = Cosigned By      Initials Name Effective Dates    Aniceto Hammond, CCC-SLP 02/03/23 -     Lara Ellsworth, SLP 06/21/22 -                     EDUCATION  The patient  has been educated in the following areas:   Dysphagia (Swallowing Impairment).        SLP GOALS       Row Name 09/03/23 1203 09/01/23 1211          (LTG) Patient will demonstrate functional swallow for    Diet Texture (Demonstrate functional swallow) regular textures  -MB regular textures  -MD     Liquid viscosity (Demonstrate functional swallow) thin liquids  -MB thin liquids  -MD     [place] (Demonstrate functional swallow) independently (over 90% accuracy)  -MB independently (over 90% accuracy)  -MD     Time Frame (Demonstrate functional swallow) 1 week  -MB 1 week  -MD     Barriers (Demonstrate functional swallow) previous functional deficits  -MB previous functional deficits  -MD     Progress/Outcomes (Demonstrate functional swallow) continuing progress toward goal  -MB new goal  -MD        (STG) Patient will tolerate trials of    Consistencies Trialed (Tolerate trials) regular textures;thin liquids  -MB regular textures;thin liquids  -MD     Desired Outcome (Tolerate trials) without signs/symptoms of aspiration;with use of compensatory strategies (see comments)  -MB without signs/symptoms of aspiration;with use of compensatory strategies (see comments)  -MD     [place] (Tolerate trials) independently (over 90% accuracy)  -MB independently (over 90% accuracy)  -MD     Time Frame (Tolerate trials) 1 week  -MB 1 week  -MD     Progress/Outcomes (Tolerate trials) continuing progress toward goal  -MB new goal  -MD        (STG) Swallow Compensatory Strategies Goal 1 (SLP)    Activity (Swallow Compensatory Strategies/Techniques Goal 1, SLP) aspiration precautions;effortful swallow  -MB aspiration precautions;effortful swallow  -MD     [place]/Accuracy (Swallow Compensatory Strategies/Techniques Goal 1, SLP) independently (over 90% accuracy)  -MB independently (over 90% accuracy)  -MD     Time Frame (Swallow Compensatory Strategies/Techniques Goal 1, SLP) 1 week  -MB 1 week  -MD     Barriers (Swallow  Compensatory Strategies/Techniques Goal 1, SLP) previous functional deficits  -MB previous functional deficits  -MD     Progress/Outcomes (Swallow Compensatory Strategies/Techniques Goal 1, SLP) continuing progress toward goal  -MB new goal  -MD               User Key  (r) = Recorded By, (t) = Taken By, (c) = Cosigned By      Initials Name Provider Type    Aniceto Hammond CCC-SLP Speech and Language Pathologist    Lara Ellsworth, SLP Speech and Language Pathologist                       Time Calculation:    Time Calculation- SLP       Row Name 09/03/23 1301             Time Calculation- SLP    SLP Start Time 1203  -MB      SLP Stop Time 1300  -MB      SLP Time Calculation (min) 57 min  -MB      SLP Received On 09/03/23  -MB         Untimed Charges    45545-JK Treatment Swallow Minutes 57  -MB         Total Minutes    Untimed Charges Total Minutes 57  -MB       Total Minutes 57  -MB                User Key  (r) = Recorded By, (t) = Taken By, (c) = Cosigned By      Initials Name Provider Type    Aniceto Hammond CCC-SLP Speech and Language Pathologist                    Therapy Charges for Today       Code Description Service Date Service Provider Modifiers Qty    66386410283 HC ST TREATMENT SWALLOW 4 9/3/2023 Aniceto Paul CCC-SLP GN 1                 ROLANDO Poole  9/3/2023

## 2023-09-03 NOTE — THERAPY TREATMENT NOTE
Acute Care - Physical Therapy Treatment Note  [facility]     Patient Name: Bert Pereira  : 1945  MRN: [id number]  Today's Date: 9/3/2023      Visit Dx:     ICD-10-CM ICD-9-CM   1. Altered mental status, unspecified altered mental status type  R41.82 780.97   2. Impaired mobility [Z74.09 (ICD-10-CM)]  Z74.09 799.89   3. Dysphagia, pharyngeal phase  R13.13 787.23     Patient Active Problem List   Diagnosis    AMS (altered mental status)    COVID-19 virus detected    Dementia    Hyperlipidemia    Brain bleed    Benign essential HTN    DM2 (diabetes mellitus, type 2)     Past Medical History:   Diagnosis Date    Constipation, unspecified     Diabetes     Essential (primary) hypertension 2023    Hemiplegia, unspecified affecting right dominant side 2023    History of falling     Hyperlipidemia, unspecified 2023    Nontraumatic intracerebral hemorrhage, unspecified 2023    Personal history of transient ischemic attack (TIA), and cerebral infarction without residual deficits     Unspecified dementia, unspecified severity, without behavioral disturbance, psychotic disturbance, mood disturbance, and anxiety      History reviewed. No pertinent surgical history.  PT Assessment (last 12 hours)       PT Evaluation and Treatment       Row Name 23 1018          Physical Therapy Time and Intention    Subjective Information complains of;weakness  -LAKIA     Document Type therapy note (daily note)  -LAKIA     Mode of Treatment physical therapy  -LAKIA     Comment confused, required encouragement to participate  -LAKIA       Row Name 23 1018          General Information    Existing Precautions/Restrictions fall  -       Row Name 23 1018          Pain    Pretreatment Pain Rating 0/10 - no pain  -LAKIA     Posttreatment Pain Rating 0/10 - no pain  -       Row Name 23 1018          Bed Mobility    Supine-Sit [place] (Bed Mobility) verbal cues;minimum assist (75% patient effort)  -LAKIA      Sit-Supine [place] (Bed Mobility) verbal cues;minimum assist (75% patient effort)  -LAKIA       Row Name 09/03/23 1018          Sit-Stand Transfer    Sit-Stand [place] (Transfers) verbal cues;minimum assist (75% patient effort);moderate assist (50% patient effort)  -LAKIA     Assistive Device (Sit-Stand Transfers) walker, front-wheeled  -LAKIA       Row Name 09/03/23 1018          Stand-Sit Transfer    Stand-Sit [place] (Transfers) verbal cues;minimum assist (75% patient effort)  -LAKIA     Assistive Device (Stand-Sit Transfers) walker, front-wheeled  -LAKIA       Row Name 09/03/23 1018          Gait/Stairs (Locomotion)    [place] Level (Gait) verbal cues;minimum assist (75% patient effort)  -LAKIA     Assistive Device (Gait) walker, front-wheeled  -LAKIA     Distance in Feet (Gait) 12' pt needed constant cues to keep walker closer to him, to increase step length, and need full assistance to turn the walker  -LAKIA       Row Name 09/03/23 1018          Positioning and Restraints    Pre-Treatment Position in bed  -LAKIA     Post Treatment Position bed  -LAKIA     In Bed fowlers;call light within reach;encouraged to call for assist;exit alarm on;side rails up x2  -LAKIA               User Key  (r) = Recorded By, (t) = Taken By, (c) = Cosigned By      Initials Name Provider Type    Leoncio Pond PTA Physical Therapist Assistant                    Physical Therapy Education       Title: PT OT SLP Therapies (Done)       Topic: Physical Therapy (Done)       Point: Mobility training (Done)       Learning Progress Summary             Patient Acceptance, E,TB, VU by  at 9/2/2023 1410    Acceptance, E,D, DU,VU by  at 9/1/2023 1045    Comment: benefits of PT and POC, call for A OOB   Family Acceptance, E,TB, VU by  at 9/2/2023 1410                         Point: Precautions (Done)       Learning Progress Summary             Patient Acceptance, E,TB, VU by  at 9/2/2023 1410    Acceptance, E,D, DU,VU by  at 9/1/2023 1045     Comment: benefits of PT and POC, call for A OOB   Family Acceptance, E,TB, VU by  at 9/2/2023 1410                                         User Key       Initials Effective Dates Name Provider Type Discipline     02/03/23 -  Pardeep Wells, PT Physical Therapist PT     07/05/23 -  Gayle Mckinney, RN Registered Nurse Nurse                  PT Recommendation and Plan     Plan of Care Reviewed With: patient  Progress: improving  Outcome Evaluation: Pt was in bed, continues to be confused, but able to follow commands.  Required encouragement to participate.  Transfered supine to sitting with min assist with HOB elevated.  Transfered sit to stand with min/mod assist.  Amb 15' with RWX and min assist, pt required constant cues to keep the walker closer to him and to increase step length.  Pt needed full assistance to be able to turn the walker.  Transfered sit to supine with min assist.  Will conitnue to work with pt to increase stength and safety and improve amb.   Outcome Measures       Row Name 09/03/23 1018 09/02/23 1350          How much help from another person do you currently need...    Turning from your back to your side while in flat bed without using bedrails? 2  -LAKIA 2  -LAKIA     Moving from lying on back to sitting on the side of a flat bed without bedrails? 2  -LAKIA 2  -LAKIA     Moving to and from a bed to a chair (including a wheelchair)? 2  -LAKIA 2  -LAKIA     Standing up from a chair using your arms (e.g., wheelchair, bedside chair)? 2  -LAKIA 2  -LAKIA     Climbing 3-5 steps with a railing? 1  -LAKIA 1  -LAKIA     To walk in hospital room? 2  -LAKIA 2  -LAKIA     AM-PAC 6 Clicks Score (PT) 11  -LAKIA 11  -LAKIA        Functional Assessment    Outcome Measure Options AM-PAC 6 Clicks Basic Mobility (PT)  -LAKIA AM-PAC 6 Clicks Basic Mobility (PT)  -LAKIA               User Key  (r) = Recorded By, (t) = Taken By, (c) = Cosigned By      Initials Name Provider Type    Leoncio Pond, PTA Physical Therapist Assistant                      Time Calculation:    PT Charges       Row Name 09/03/23 1018             Time Calculation    Start Time 1018  -LAKIA      Stop Time 1047  -LAKIA      Time Calculation (min) 29 min  -LAKIA      PT Received On 09/03/23  -LAKIA         Time Calculation- PT    Total Timed Code Minutes- PT 29 minute(s)  -LAKIA         Timed Charges    05361 - PT Therapeutic Exercise Minutes 10  -LAKIA      62351 - Gait Training Minutes  19  -LAKIA         Total Minutes    Timed Charges Total Minutes 29  -LAKIA       Total Minutes 29  -LAKIA                User Key  (r) = Recorded By, (t) = Taken By, (c) = Cosigned By      Initials Name Provider Type    Leoncio Pond PTA Physical Therapist Assistant                  Therapy Charges for Today       Code Description Service Date Service Provider Modifiers Qty    49421908926 HC GAIT TRAINING EA 15 MIN 9/2/2023 Leoncio Srivastava, PTA GP 2    69450192769 HC PT THER PROC EA 15 MIN 9/2/2023 Leoncio Srivastava, PTA GP 1    21582654997 HC GAIT TRAINING EA 15 MIN 9/3/2023 Leoncio Srivastava, PTA GP 1    17067190468 HC PT THER PROC EA 15 MIN 9/3/2023 Leoncio Srivastava, PTA GP 1            PT G-Codes  Outcome Measure Options: AM-PAC 6 Clicks Basic Mobility (PT)  AM-PAC 6 Clicks Score (PT): 11  AM-PAC 6 Clicks Score (OT): 12    Leoncio Srivastava PTA  9/3/2023

## 2023-09-03 NOTE — PROGRESS NOTES
Chief Complaint: Confusion  HPI   78-year-old man with past medical history of hypertension diabetes mellitus and recurrent ischemic stroke, has recently about 10 days ago was diagnosed with hemorrhagic stroke treated conservatively and patient sent to rehab, patient before the hemorrhagic stroke with independent but after that there is significant decline of the cognitive function, in rehab it was noted that the patient has significant decline in his alertness was not able to cooperate with physical therapy and this was dramatic change so they sent him to our hospital, in the ER CT scan have shown that there is hemologic changes and neurosurgery was consulted to consider this residual effect of the recent hemorrhagic stroke and no active bleed is there and recommends continuing conservative management, we need to rule out stroke, initial CT perfusion scan of the neck and CT angiogram of the head and neck is not impressive of major occlusion of large blood vessel with some element of mild ICA stenosis bilateral.    Follow-up of MRI of the brain was negative for stroke, neurology was consulted and evaluated the patient and he feels there is no evidence of a stroke and the change of cognitive status is possibly related to the recent brain bleed in addition to COVID infection  Testing for COVID in the ER came positive but patient is saturating well in room air.  I encountered the patient today, and he started to show marked improvement in his cognitive functions, patient was oriented that he is in the hospital, when asked about his granddaughter if she visited him today he said no but patient still shows confusions and his answer is not perfectly normal, I contacted the granddaughter that the patient is improving and we can consider sending him back to skilled nursing facility, she declined for that as she is not happy with the facility and she does not want him to go to skilled nursing facility or rehab she wants him  to go home with home health care, I think the patient needs another few days in the hospital to be in a stable condition to go home with home health care           Review of Systems   Cannot be obtained as patient is not communicating well     Objective    Temp:  [97.3 °F (36.3 °C)-99.5 °F (37.5 °C)] 98.7 °F (37.1 °C)  Heart Rate:  [] 91  Resp:  [16-18] 16  BP: (139-160)/() 141/78  Physical Exam  General patient is more alert today, oriented to the place, can answer some questions but still confused  Neck no elevated jugular venous pressure, no cervical adenopathy  Chest no tachypnea saturating well on room air chest clear  air entry with no rhonchi or crepitations  Heart regular rate and rhythm with no murmurs or gallop  Abdomen soft with no tenderness no rigidity no organomegaly        Results Review:  I have reviewed the labs, radiology results, and diagnostic studies.     Laboratory Data:          Results from last 7 days   Lab Units 09/01/23  0454 08/31/23  1815 08/30/23  1030   WBC 10*3/mm3 8.62 9.93 9.43   HEMOGLOBIN g/dL 15.4 15.0 15.1   HEMATOCRIT % 49.2 46.5 47.6   PLATELETS 10*3/mm3 149 170 199                Results from last 7 days   Lab Units 09/01/23  0454 08/31/23  1928 08/30/23  1030   SODIUM mmol/L 133* 134* 139   POTASSIUM mmol/L 4.3 4.6 4.2   CHLORIDE mmol/L 98 96* 98   CO2 mmol/L 22.0 26.0 28.0   BUN mg/dL 18 22 24*   CREATININE mg/dL 0.77 0.82 0.81   CALCIUM mg/dL 9.9 9.9 10.5   BILIRUBIN mg/dL 0.5 0.5 0.5   ALK PHOS U/L 84 87 92   ALT (SGPT) U/L 16 19 17   AST (SGOT) U/L 18 18 14   GLUCOSE mg/dL 185* 286* 268*         Culture Data:   No results found for: BLOODCX, URINECX, WOUNDCX, MRSACX, RESPCX, STOOLCX     Radiology Data:   Imaging Results (Last 24 Hours)         Procedure Component Value Units Date/Time     XR Chest 1 View [[id number]] Collected: 08/31/23 1857       Updated: 08/31/23 1900     Narrative:       EXAMINATION: Chest 1 view 08/31/2023     HISTORY: Acute stroke  protocol.     FINDINGS: Upright frontal projection of the chest demonstrates mild  cardiomegaly. The thoracic aorta and great vessels are ectatic. Lungs  are fully expanded and clear. No consolidative pneumonia or effusion.        Impression:       1. No acute disease.  This report was finalized on 08/31/2023 18:57 by Dr. Martinez Gonzales MD.     CT CEREBRAL PERFUSION WITH & WITHOUT CONTRAST [[id number]] Collected: 08/31/23 1838       Updated: 08/31/23 1845     Narrative:       Indication: Confusion     Exam:      1. Perfusion CT is performed to acquire images tracking the temporal  course of iodinated contrast material passing through the cerebral  circulation. Perfusion parameters, such as cerebral blood flow (CBF),  cerebral blood volume (CBV), mean transit time (MTT), etc. are  calculated by [employer] with additional provided perfusion maps and  estimated stroke volumes.  2. Automated exposure control (AEC) protocols are utilized on the  scanner to ensure dose lowered technique.      Comparison: Noncontrast CT brain and brain angiogram dated 8/31/2023  5:42 PM CDT     Findings:     Abnormal perfusion exam, with size and distribution of the perfusion  abnormality described below.     Distribution: Perfusion abnormality involves the left basal ganglia and  a site of recent intra-axial hemorrhage..     Tmax >6.0 seconds volume: 11 ml     CBF < 30% volume: 0 ml     Mismatch volume: 11 ml      Mismatch ratio: Infinite        Impression:       Impression:  1. There is noted to be delayed transit time of contrast in the left  basal ganglia at the site of a recent intra-axial hemorrhage. There is  associated edema and some mass effect with subtle effacement of the  frontal horn of the left lateral ventricle at this site related to the  recent hemorrhage likely accounting for the delayed perfusion. I do not  see evidence of core infarct or significant penumbra to suggest large  vessel occlusion/acute ischemia.  This report  was finalized on 08/31/2023 18:42 by Dr. Martinez Gonzales MD.     CT Angiogram Head w AI Analysis of LVO [[id number]] Collected: 08/31/23 1818       Updated: 08/31/23 1841     Narrative:       EXAMINATION: CT angiogram of the neck and head with contrast with AI  analysis for LVO. 08/31/2023     HISTORY: Acute stroke suspected. Neurologic deficit.     DOSE: 414 mGycm. All CT scans are performed using dose optimization  techniques as appropriate to the performed exam and including at least  one of the following: Automated exposure control, adjustment of the mA  and/or kV according to size, and the use of the iterative reconstruction  technique..     FINDINGS: Multiple contiguous axial images are obtained from the aortic  arch through the vertex following intravenous contrast infusion with  reformatted images obtained in the sagittal and coronal projections from  the original data set. MIPS are also obtained.     There is atheromatous calcification of the thoracic aorta and proximal  great vessels without evidence of aneurysm or dissection. No rate  limiting stenosis at the origin of the great vessels. There are  emphysematous changes of the lungs. The thyroid is homogeneous in  density without evidence of enlargement or nodularity. Level of the true  and false cords is unremarkable. No pathologically enlarged cervical  chain or posterior triangle lymphadenopathy is present.     The origin of the vertebral arteries are widely patent. The left  vertebral artery is dominant. There is mild calcific plaquing involving  the intracranial segment of the left vertebral artery without rate  limiting stenosis. Both vertebral arteries are otherwise widely patent  to the basilar artery.     There is scattered plaquing involving the right common carotid artery  without evidence of rate limiting stenosis. There is calcific plaquing  involving the carotid bifurcation with involvement of the carotid bulb  and proximal ICA. This  results in an approximate 40% cross-sectional  stenosis within the proximal right ICA. The right ICA is tortuous but  otherwise widely patent to the skull base.     Examination of the left common carotid artery demonstrates scattered  plaquing without rate limiting stenosis. There is calcific plaquing  involving the carotid bulb and proximal left ICA with a less than 25%  cross-sectional stenosis at the level of the left carotid bulb. The left  ICA is tortuous.     Examination of the [place] including AI analysis for LVO is  also performed including MIPS.     The left vertebral artery is dominant. Mild calcific plaquing involving  the intracranial segment of the left vertebral artery does not result in  significant stenosis. Both vertebral arteries are patent to the basilar  artery. The posterior inferior cerebellar arteries are normal in  appearance. There is mild stenosis involving the mid segment of the  basilar artery. The superior cerebellar artery are widely patent. There  is a persistent fetal origin of the right posterior cerebral artery. The  left posterior cerebral artery emanates from the distal basilar artery  with a patent posterior communicating artery also present and  contributing to the posterior circulation.     Both distal extracranial ICAs are widely patent. There is calcific  plaquing involving the petrous segment of the right ICA without rate  limiting stenosis. There is calcific plaquing involving the cavernous  segment of both ICAs with mild associated stenosis. There is also  calcific plaquing involving the distal cavernous and proximal  supraclinoid segment of both ICAs with mild associated bilateral  stenosis. Both vessels are patent to the terminus. The anterior and  middle cerebral arteries are normal in appearance with no evidence of  intraluminal thrombus or berry aneurysm. No focal steno-occlusive  disease. Previously described focus of linear density within the mesial  left  temporal lobe appears to be separable from the left middle cerebral  artery branches        Impression:       1.. Calcific plaquing involving the intracranial segment of the left  vertebral artery without evidence of rate limiting stenosis. There is a  small short segment stenosis within the basilar artery above the  anterior inferior cerebellar artery origin. The superior cerebellar  arteries are widely patent. The right posterior cerebral artery  represents a persistent fetal origin from the anterior circulation. The  left PCA emanates from the basilar artery but also has a contribution  from the left posterior communicating artery.  2. Calcific plaquing involving the petrous segment of the right ICA as  well as the cavernous and proximal supraclinoid segment of both ICAs  with mild associated stenosis. Both ICAs are patent to the terminus. The  anterior and middle cerebral arteries are normal in appearance with no  intraluminal thrombus or berry aneurysm demonstrated. No focal stenosis  is appreciated within the anterior or middle cerebral arteries.  3. 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  This is slightly more prominent on the right than on the left with an  approximate 40% cross-sectional stenosis within the proximal right ICA  and a less than 25% cross-sectional stenosis at the level of the left  carotid bulb. The more distal extracranial ICAs are tortuous but  otherwise patent.  This report was finalized on 08/31/2023 18:37 by Dr. Martinez Gonzales MD.     CT Angiogram Neck [[id number]] Collected: 08/31/23 1818       Updated: 08/31/23 1841     Narrative:       EXAMINATION: CT angiogram of the neck and head with contrast with AI  analysis for LVO. 08/31/2023     HISTORY: Acute stroke suspected. Neurologic deficit.     DOSE: 414 mGycm. All CT scans are performed using dose optimization  techniques as appropriate to the performed exam and including at least  one of the following:  Automated exposure control, adjustment of the mA  and/or kV according to size, and the use of the iterative reconstruction  technique..     FINDINGS: Multiple contiguous axial images are obtained from the aortic  arch through the vertex following intravenous contrast infusion with  reformatted images obtained in the sagittal and coronal projections from  the original data set. MIPS are also obtained.     There is atheromatous calcification of the thoracic aorta and proximal  great vessels without evidence of aneurysm or dissection. No rate  limiting stenosis at the origin of the great vessels. There are  emphysematous changes of the lungs. The thyroid is homogeneous in  density without evidence of enlargement or nodularity. Level of the true  and false cords is unremarkable. No pathologically enlarged cervical  chain or posterior triangle lymphadenopathy is present.     The origin of the vertebral arteries are widely patent. The left  vertebral artery is dominant. There is mild calcific plaquing involving  the intracranial segment of the left vertebral artery without rate  limiting stenosis. Both vertebral arteries are otherwise widely patent  to the basilar artery.     There is scattered plaquing involving the right common carotid artery  without evidence of rate limiting stenosis. There is calcific plaquing  involving the carotid bifurcation with involvement of the carotid bulb  and proximal ICA. This results in an approximate 40% cross-sectional  stenosis within the proximal right ICA. The right ICA is tortuous but  otherwise widely patent to the skull base.     Examination of the left common carotid artery demonstrates scattered  plaquing without rate limiting stenosis. There is calcific plaquing  involving the carotid bulb and proximal left ICA with a less than 25%  cross-sectional stenosis at the level of the left carotid bulb. The left  ICA is tortuous.     Examination of the [place] including AI  analysis for LVO is  also performed including MIPS.     The left vertebral artery is dominant. Mild calcific plaquing involving  the intracranial segment of the left vertebral artery does not result in  significant stenosis. Both vertebral arteries are patent to the basilar  artery. The posterior inferior cerebellar arteries are normal in  appearance. There is mild stenosis involving the mid segment of the  basilar artery. The superior cerebellar artery are widely patent. There  is a persistent fetal origin of the right posterior cerebral artery. The  left posterior cerebral artery emanates from the distal basilar artery  with a patent posterior communicating artery also present and  contributing to the posterior circulation.     Both distal extracranial ICAs are widely patent. There is calcific  plaquing involving the petrous segment of the right ICA without rate  limiting stenosis. There is calcific plaquing involving the cavernous  segment of both ICAs with mild associated stenosis. There is also  calcific plaquing involving the distal cavernous and proximal  supraclinoid segment of both ICAs with mild associated bilateral  stenosis. Both vessels are patent to the terminus. The anterior and  middle cerebral arteries are normal in appearance with no evidence of  intraluminal thrombus or berry aneurysm. No focal steno-occlusive  disease. Previously described focus of linear density within the mesial  left temporal lobe appears to be separable from the left middle cerebral  artery branches        Impression:       1.. Calcific plaquing involving the intracranial segment of the left  vertebral artery without evidence of rate limiting stenosis. There is a  small short segment stenosis within the basilar artery above the  anterior inferior cerebellar artery origin. The superior cerebellar  arteries are widely patent. The right posterior cerebral artery  represents a persistent fetal origin from the anterior  circulation. The  left PCA emanates from the basilar artery but also has a contribution  from the left posterior communicating artery.  2. Calcific plaquing involving the petrous segment of the right ICA as  well as the cavernous and proximal supraclinoid segment of both ICAs  with mild associated stenosis. Both ICAs are patent to the terminus. The  anterior and middle cerebral arteries are normal in appearance with no  intraluminal thrombus or berry aneurysm demonstrated. No focal stenosis  is appreciated within the anterior or middle cerebral arteries.  3. 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  This is slightly more prominent on the right than on the left with an  approximate 40% cross-sectional stenosis within the proximal right ICA  and a less than 25% cross-sectional stenosis at the level of the left  carotid bulb. The more distal extracranial ICAs are tortuous but  otherwise patent.  This report was finalized on 08/31/2023 18:37 by Dr. Martinez Gonzales MD.     CT Head Without Contrast Stroke Protocol [[id number]] Collected: 08/31/23 1751       Updated: 08/31/23 1807     Narrative:       CT HEAD WO CONTRAST STROKE PROTOCOL- 8/31/2023 5:40 PM CDT     HISTORY: Neuro deficit, acute, stroke suspected     COMPARISON: None      DLP: 748 mGy cm. All CT scans are performed using dose optimization  techniques as appropriate to the performed exam and including at least  one of the following: Automated exposure control, adjustment of the mA  and/or kV according to size, and the use of the iterative reconstruction  technique.     TECHNIQUE: Serial axial tomographic images of the brain were obtained  without the use of intravenous contrast.      FINDINGS:   The midline structures are nondisplaced. There is diffuse atrophy of the  brain with prominence of the subarachnoid spaces and ventricular  enlargement. There is evidence of a previous posterior division right  MCA territory infarct with  encephalomalacia within the right temporal  and parietal lobe. A remote infarct involving the right basal ganglia  and left thalamus are also present.     There is a subtle focus of increased density within the left basal  ganglia. This has ill-defined margins measuring approximately 2.6 x 2.0  cm in size with subtle effacement of the frontal horn of the left  lateral ventricle. I suspect this may represent a focus of subacute  hemorrhage. There is evidence of intraventricular hemorrhage with blood  layering dependently within the posterior horn of both lateral  ventricles. No evidence of acute intra-axial or extra-axial hemorrhage.  There is also evidence of a previous right frontal lobe infarct or  posttraumatic change with encephalomalacia.     Noted on image 13 of series 5 and image 12 of series 9 is focal more  linear hyperdensity within the mesial left temporal lobe. This is in  close proximity to the M2 segments of the left MCA and could potentially  represent some clot within one of these vessels. CT angiography may be  helpful for better delineation.     The orbits are unremarkable.     The visualized paranasal sinuses and mastoid air cells are normally  aerated.        Impression:       1.. Subtle hyperdensity within the left basal ganglia with some mass  effect and mild effacement of the frontal horn of the left lateral  ventricle. Radiographically I would favor that this represents a focus  of subacute intra-axial hemorrhage. There is evidence of blood layering  dependently within the posterior horn of both lateral ventricles. No  evidence of acute appearing intra or extra-axial hemorrhage although the  blood layering within the posterior horn of the left lateral ventricle  is relatively hyperdense.  2. Focal hyperdensity which is more linear distribution within the  mesial left temporal lobe. This could potentially represent some  hyperdense clot within a left M2 vessel versus an additional small  focus  of intra-axial hemorrhage. This is different in density in comparison  with the adjacent hemorrhage in the left basal ganglia. CT angiography  may be helpful for further characterization.  3. Diffuse atrophy and small vessel disease. Evidence of previous  infarcts involving the right frontal lobe as well as the right temporal  and parietal lobe in the MCA distribution. Remote right basal ganglia  and left thalamic lacunar infarcts are also present.     This report was finalized on 08/31/2023 18:03 by Dr. Martinez Gonzales MD.                I have reviewed the patient's current medications.      Assessment/Plan   Assessment       Active Hospital Problems     Diagnosis      **AMS (altered mental status)      COVID-19 virus detected      Dementia      Hyperlipidemia      Brain bleed      Benign essential HTN      DM2 (diabetes mellitus, type 2)           Treatment Plan  COVID-19 detected, patient is saturating well on room air, to continue follow-up of oxygen saturations, currently on zinc and vitamin C    Brain bleed residual effect of recent hemorrhagic stroke patient off Plavix, seen by neurosurgery and recommends to continue conservative treatment no intervention needed at this point    To rule out stroke, CT perfusion scan of the head is negative for major occlusion of the large blood vessel, follow-up of MRI of the brain has ruled out stroke, neurology is following and added aspirin for history of ischemic stroke, neurology signed off    Encephalopathy possibly related to underlying cognitive dysfunctions with possible dementia and recent hemorrhagic stroke, in addition to recent COVID action, patient started to show improvement in his cognitive function    Diabetes mellitus on insulin regimen for appropriate control of the blood sugar    Hypertension, blood pressure reasonably controlled    Feeding patient has not passed swallowing test, speech therapy following, on IV fluids    Disposition, after  discussion with the granddaughter with whom the patient is living she wants him to go home with home health care, declining for any skilled nursing facility placement, I guess patient needs few days till his cognitive function improved better and his physical ability is improving to be safe to go home with home health care, granddaughter told me that she is working from home and she is living with her  and her mother so patient can has enough care     Medical Decision Making  Number and Complexity of problems: High complexity  Differential Diagnosis: Rule out stroke     Conditions and Status             MDM Data  External documents reviewed: No  Cardiac tracing (EKG, telemetry) interpretation: No  Radiology interpretation: Yes  Labs reviewed: Yes  Any tests that were considered but not ordered: Yes      Decision rules/scores evaluated (example [id number], [place], etc): No     Discussed with: With granddaughter     Care Planning  Shared decision making: With granddaughter  Code status and discussions: With granddaughter     Disposition  Social Determinants of Health that impact treatment or disposition: Recent diagnosis of COVID to follow oxygen saturation and to follow-up of MRI of the brain to rule out stroke and follow-up of neurology consult and follow-up of improvement of the cognitive status  I expect the patient to be discharged to home with home health care in 2 to 3 days days.      Electronically signed by Guanako Mock MD, 09/01/23, 09:31 CDT.

## 2023-09-03 NOTE — PLAN OF CARE
Goal Outcome Evaluation:  Plan of Care Reviewed With: patient        Progress: improving  Outcome Evaluation: Pt was in bed, continues to be confused, but able to follow commands.  Required encouragement to participate.  Transfered supine to sitting with min assist with HOB elevated.  Transfered sit to stand with min/mod assist.  Amb 15' with RWX and min assist, pt required constant cues to keep the walker closer to him and to increase step length.  Pt needed full assistance to be able to turn the walker.  Transfered sit to supine with min assist.  Will javonnue to work with pt to increase stength and safety and improve amb.

## 2023-09-03 NOTE — PLAN OF CARE
Goal Outcome Evaluation:  Plan of Care Reviewed With: patient        Progress: no change  Outcome Evaluation: Alert, oriented to self only. No c/o pain this shift. Incontinent of bladder, care provided. NIH=5. Call light in reach, safety maintained.

## 2023-09-03 NOTE — PLAN OF CARE
Goal Outcome Evaluation:   Pt alert and oriented x1. VSS. no c/o pain. WILLIS. PPP. SCDS  for VTE. . Tolerating regular diet. Skin intact. Voiding . Last BM 9/1/23. BS monitoring AC/HS Isolation Enhanced Droplet ( COVID) D/c plans rehab. Call light within reach. Safety maintained.

## 2023-09-03 NOTE — CASE MANAGEMENT/SOCIAL WORK
Continued Stay Note  [facility]     Patient Name: Bert Pereira  MRN: [id number]  Today's Date: 9/3/2023    Admit Date: 8/31/2023    Plan: [facility]   Discharge Plan       Row Name 09/03/23 1027       Plan    Plan [facility]    Plan Comments JENNIFFER contacted Luisa in admissions at [facility] at 9:34AM to see if patient can return. No response yet. JENNIFFER called facility and spoke to Jacque and she says patient cannot return without approval from Luisa. Jacque says she can also contact Luisa to ask her to return JENNIFFER call. Will await response re return to SNF.               BANDAR Green

## 2023-09-04 LAB
GLUCOSE BLDC GLUCOMTR-MCNC: 180 MG/DL (ref 70–130)
GLUCOSE BLDC GLUCOMTR-MCNC: 236 MG/DL (ref 70–130)
GLUCOSE BLDC GLUCOMTR-MCNC: 245 MG/DL (ref 70–130)
GLUCOSE BLDC GLUCOMTR-MCNC: 318 MG/DL (ref 70–130)

## 2023-09-04 PROCEDURE — 97110 THERAPEUTIC EXERCISES: CPT

## 2023-09-04 PROCEDURE — 97530 THERAPEUTIC ACTIVITIES: CPT

## 2023-09-04 PROCEDURE — 97116 GAIT TRAINING THERAPY: CPT

## 2023-09-04 PROCEDURE — 82948 REAGENT STRIP/BLOOD GLUCOSE: CPT

## 2023-09-04 PROCEDURE — 63710000001 INSULIN DETEMIR PER 5 UNITS: Performed by: STUDENT IN AN ORGANIZED HEALTH CARE EDUCATION/TRAINING PROGRAM

## 2023-09-04 PROCEDURE — 63710000001 INSULIN LISPRO (HUMAN) PER 5 UNITS: Performed by: INTERNAL MEDICINE

## 2023-09-04 RX ADMIN — INSULIN LISPRO 5 UNITS: 100 INJECTION, SOLUTION INTRAVENOUS; SUBCUTANEOUS at 21:02

## 2023-09-04 RX ADMIN — Medication 10 ML: at 09:02

## 2023-09-04 RX ADMIN — ASPIRIN 81 MG: 81 TABLET, CHEWABLE ORAL at 09:02

## 2023-09-04 RX ADMIN — OXYCODONE HYDROCHLORIDE AND ACETAMINOPHEN 500 MG: 500 TABLET ORAL at 09:02

## 2023-09-04 RX ADMIN — Medication 10 ML: at 21:02

## 2023-09-04 RX ADMIN — MEMANTINE HYDROCHLORIDE 2.5 MG: 5 TABLET, FILM COATED ORAL at 09:02

## 2023-09-04 RX ADMIN — INSULIN LISPRO 2 UNITS: 100 INJECTION, SOLUTION INTRAVENOUS; SUBCUTANEOUS at 09:10

## 2023-09-04 RX ADMIN — ATORVASTATIN CALCIUM 20 MG: 10 TABLET, FILM COATED ORAL at 21:02

## 2023-09-04 RX ADMIN — INSULIN DETEMIR 10 UNITS: 100 INJECTION, SOLUTION SUBCUTANEOUS at 21:02

## 2023-09-04 RX ADMIN — INSULIN LISPRO 3 UNITS: 100 INJECTION, SOLUTION INTRAVENOUS; SUBCUTANEOUS at 13:01

## 2023-09-04 RX ADMIN — INSULIN LISPRO 3 UNITS: 100 INJECTION, SOLUTION INTRAVENOUS; SUBCUTANEOUS at 16:28

## 2023-09-04 RX ADMIN — MEMANTINE HYDROCHLORIDE 2.5 MG: 5 TABLET, FILM COATED ORAL at 21:02

## 2023-09-04 RX ADMIN — ZINC SULFATE 220 MG (50 MG) CAPSULE 220 MG: CAPSULE at 09:02

## 2023-09-04 NOTE — PLAN OF CARE
Goal Outcome Evaluation:  Plan of Care Reviewed With: patient        Progress: no change  Outcome Evaluation: Alert, oriented to self only. No c/o pain this shift. Incontinent of bladder, care provided. NIH=2. Call light in reach, safety maintained.

## 2023-09-04 NOTE — THERAPY TREATMENT NOTE
Acute Care - Physical Therapy Treatment Note  [facility]     Patient Name: Bert Pereira  : 1945  MRN: [id number]  Today's Date: 2023      Visit Dx:     ICD-10-CM ICD-9-CM   1. Altered mental status, unspecified altered mental status type  R41.82 780.97   2. Impaired mobility [Z74.09 (ICD-10-CM)]  Z74.09 799.89   3. Dysphagia, pharyngeal phase  R13.13 787.23     Patient Active Problem List   Diagnosis    AMS (altered mental status)    COVID-19 virus detected    Dementia    Hyperlipidemia    Brain bleed    Benign essential HTN    DM2 (diabetes mellitus, type 2)     Past Medical History:   Diagnosis Date    Constipation, unspecified     Diabetes     Essential (primary) hypertension 2023    Hemiplegia, unspecified affecting right dominant side 2023    History of falling     Hyperlipidemia, unspecified 2023    Nontraumatic intracerebral hemorrhage, unspecified 2023    Personal history of transient ischemic attack (TIA), and cerebral infarction without residual deficits     Unspecified dementia, unspecified severity, without behavioral disturbance, psychotic disturbance, mood disturbance, and anxiety      History reviewed. No pertinent surgical history.  PT Assessment (last 12 hours)       PT Evaluation and Treatment       Row Name 23 1353          Physical Therapy Time and Intention    Subjective Information no complaints  -KJ     Document Type therapy note (daily note)  -KJ     Mode of Treatment physical therapy  -KJ     Patient Effort good  -KJ     Comment required encouragement to participate; slow to follow tasks  -KJ       Row Name 23 1353          General Information    Existing Precautions/Restrictions fall  -KJ       Row Name 23 1353          Pain    Pretreatment Pain Rating 0/10 - no pain  -KJ     Posttreatment Pain Rating 0/10 - no pain  -KJ       Row Name 23 1353          Bed Mobility    Supine-Sit [place] (Bed Mobility) verbal cues;minimum  assist (75% patient effort)  -KJ       Row Name 09/04/23 1353          Sit-Stand Transfer    Sit-Stand [place] (Transfers) verbal cues;minimum assist (75% patient effort)  -KJ     Assistive Device (Sit-Stand Transfers) walker, front-wheeled  -KJ       Row Name 09/04/23 1353          Stand-Sit Transfer    Stand-Sit [place] (Transfers) verbal cues;contact guard  -KJ     Assistive Device (Stand-Sit Transfers) walker, front-wheeled  -KJ       Row Name 09/04/23 1353          Gait/Stairs (Locomotion)    [place] Level (Gait) verbal cues;contact guard  -KJ     Assistive Device (Gait) walker, front-wheeled  -KJ     Distance in Feet (Gait) 15' x 3  -KJ     Deviations/Abnormal Patterns (Gait) nandini decreased  -KJ     Bilateral Gait Deviations forward flexed posture  -KJ     Comment, (Gait/Stairs) cues for walker placement  -KJ       Row Name 09/04/23 1353          Motor Skills    Comments, Therapeutic Exercise A/AAROM BLE  -KJ       Row Name 09/04/23 1353          Positioning and Restraints    Pre-Treatment Position in bed  -KJ     Post Treatment Position chair  -KJ     In Bed with family/caregiver  -KJ               User Key  (r) = Recorded By, (t) = Taken By, (c) = Cosigned By      Initials Name Provider Type    Gogo Soler, PTA Physical Therapist Assistant                    Physical Therapy Education       Title: PT OT SLP Therapies (Done)       Topic: Physical Therapy (Done)       Point: Mobility training (Done)       Learning Progress Summary             Patient Acceptance, E,TB, VU by  at 9/3/2023 1405    Acceptance, E,TB, VU by  at 9/2/2023 1410    Acceptance, E,D, DU,VU by  at 9/1/2023 1045    Comment: benefits of PT and POC, call for A OOB   Family Acceptance, E,TB, VU by  at 9/2/2023 1410                         Point: Precautions (Done)       Learning Progress Summary             Patient Acceptance, E,TB, VU by  at 9/3/2023 1405    Acceptance, E,TB, VU by  at 9/2/2023 1410     Acceptance, LEIZABETH MOULTON DU,VU by  at 9/1/2023 1045    Comment: benefits of PT and POC, call for A OOB   Family Acceptance, KENNEY,TB, VU by  at 9/2/2023 1410                                         User Key       Initials Effective Dates Name Provider Type Discipline     02/03/23 -  Pardeep Wells, PT Physical Therapist PT     07/05/23 -  Gayle Mckinney, RN Registered Nurse Nurse                  PT Recommendation and Plan     Plan of Care Reviewed With: patient  Progress: improving  Outcome Evaluation: PT tx completed. Pt granddaughter present. Denies any pn. Slow to follow tasks and requires moderate repetitive cues and  time to process. Tenzin bed mobility, Tenzin sit<>stand, amb 15' x 3 in room with r wx CG. Assistance guiding walker. Decreased safety awareness. Granddaughter states he is discharging to her home with HH.   Outcome Measures       Row Name 09/03/23 1018 09/02/23 1350          How much help from another person do you currently need...    Turning from your back to your side while in flat bed without using bedrails? 2  -LAKIA 2  -LAKIA     Moving from lying on back to sitting on the side of a flat bed without bedrails? 2  -LAKIA 2  -LAKIA     Moving to and from a bed to a chair (including a wheelchair)? 2  -LAKIA 2  -LAKIA     Standing up from a chair using your arms (e.g., wheelchair, bedside chair)? 2  -LAKIA 2  -LAKIA     Climbing 3-5 steps with a railing? 1  -LAKIA 1  -LAKIA     To walk in hospital room? 2  -LAKIA 2  -LAKIA     AM-PAC 6 Clicks Score (PT) 11  -LAKIA 11  -LAKIA        Functional Assessment    Outcome Measure Options AM-PAC 6 Clicks Basic Mobility (PT)  -LAKIA AM-PAC 6 Clicks Basic Mobility (PT)  -LAKIA               User Key  (r) = Recorded By, (t) = Taken By, (c) = Cosigned By      Initials Name Provider Type    Leoncio Pond PTA Physical Therapist Assistant                     Time Calculation:    PT Charges       Row Name 09/04/23 1417             Time Calculation    Start Time 1353  -KJ      Stop Time 1417  -KJ      Time  Calculation (min) 24 min  -KJ      PT Received On 09/04/23  -KJ      PT Goal Re-Cert Due Date 09/11/23  -KJ         Time Calculation- PT    Total Timed Code Minutes- PT 24 minute(s)  -KJ                User Key  (r) = Recorded By, (t) = Taken By, (c) = Cosigned By      Initials Name Provider Type    Gogo Soler, HILARY Physical Therapist Assistant                  Therapy Charges for Today       Code Description Service Date Service Provider Modifiers Qty    44427548594 HC GAIT TRAINING EA 15 MIN 9/4/2023 Gogo Cisneros, PTA  1    57734880953 HC PT THER PROC EA 15 MIN 9/4/2023 Gogo Cisneros PTA  1            PT G-Codes  Outcome Measure Options: AM-PAC 6 Clicks Basic Mobility (PT)  AM-PAC 6 Clicks Score (PT): 12  AM-PAC 6 Clicks Score (OT): 12    Gogo Cisneros PTA  9/4/2023

## 2023-09-04 NOTE — PLAN OF CARE
Goal Outcome Evaluation:  Plan of Care Reviewed With: patient        Progress: improving  Outcome Evaluation: PT tx completed. Pt granddaughter present. Denies any pn. Slow to follow tasks and requires moderate repetitive cues and  time to process. Tenzin bed mobility, Tenzin sit<>stand, amb 15' x 3 in room with r wx CG. Assistance guiding walker. Decreased safety awareness. Granddaughter states he is discharging to her home with HH.

## 2023-09-04 NOTE — PROGRESS NOTES
[facility] Medicine Services  INPATIENT PROGRESS NOTE    Patient Name: Bert Pereira  Date of Admission: 8/31/2023  Today's Date: 09/04/23  Length of Stay: 1  Primary Care Physician: Provider, No Known    Subjective   Chief Complaint: Follow-up  Altered Mental Status     Patient is a 78-year-old male with a history of hypertension, diabetes, stroke, dementia who was recently diagnosed with hemorrhagic stroke about 10 days ago.  Patient is now on admission for altered mental status, and was found to be COVID-19 positive.  Neurosurgery consulted and suggestion was that the new AMS was likely secondary to COVID-19 infection.  Chest x-ray shows no evidence of an acute disease in the lungs.  Patient is currently on room air and satting in the 90s.    No acute event reported overnight.  Vital signs are stable.  Patient laying down comfortably when seen this morning.  Patient is oriented to self.    Review of Systems   Unable to perform ROS: Dementia    All pertinent negatives and positives are as above. All other systems have been reviewed and are negative unless otherwise stated.     Objective    Temp:  [97.7 °F (36.5 °C)-98.6 °F (37 °C)] 98.4 °F (36.9 °C)  Heart Rate:  [77-86] 81  Resp:  [18] 18  BP: (108-148)/(73-92) 120/75  Physical Exam  Vitals and nursing note reviewed.   Constitutional:       General: He is not in acute distress.     Appearance: Normal appearance. He is not diaphoretic.   HENT:      Head: Normocephalic and atraumatic.      Right Ear: External ear normal.      Left Ear: External ear normal.   Cardiovascular:      Rate and Rhythm: Normal rate and regular rhythm.      Heart sounds: Normal heart sounds.   Pulmonary:      Effort: Pulmonary effort is normal. No respiratory distress.      Breath sounds: Normal breath sounds.   Chest:      Chest wall: No tenderness.   Abdominal:      General: Bowel sounds are normal.      Palpations: Abdomen is soft.      Tenderness:  There is no abdominal tenderness.   Musculoskeletal:         General: No swelling or tenderness.      Cervical back: Normal range of motion and neck supple.      Right lower leg: No edema.      Left lower leg: No edema.   Skin:     General: Skin is warm and dry.      Capillary Refill: Capillary refill takes less than 2 seconds.      Findings: No erythema or rash.   Neurological:      Mental Status: He is alert. Mental status is at baseline. He is disoriented.      Motor: No weakness.           Results Review:  I have reviewed the labs, radiology results, and diagnostic studies.    Laboratory Data:   Results from last 7 days   Lab Units 09/01/23  0454 08/31/23  1815 08/30/23  1030   WBC 10*3/mm3 8.62 9.93 9.43   HEMOGLOBIN g/dL 15.4 15.0 15.1   HEMATOCRIT % 49.2 46.5 47.6   PLATELETS 10*3/mm3 149 170 199        Results from last 7 days   Lab Units 09/01/23  0454 08/31/23  1928 08/30/23  1030   SODIUM mmol/L 133* 134* 139   POTASSIUM mmol/L 4.3 4.6 4.2   CHLORIDE mmol/L 98 96* 98   CO2 mmol/L 22.0 26.0 28.0   BUN mg/dL 18 22 24*   CREATININE mg/dL 0.77 0.82 0.81   CALCIUM mg/dL 9.9 9.9 10.5   BILIRUBIN mg/dL 0.5 0.5 0.5   ALK PHOS U/L 84 87 92   ALT (SGPT) U/L 16 19 17   AST (SGOT) U/L 18 18 14   GLUCOSE mg/dL 185* 286* 268*       Culture Data:   No results found for: BLOODCX, URINECX, WOUNDCX, MRSACX, RESPCX, STOOLCX    Radiology Data:   Imaging Results (Last 24 Hours)       ** No results found for the last 24 hours. **            I have reviewed the patient's current medications.     Assessment/Plan   Assessment  Active Hospital Problems    Diagnosis     **AMS (altered mental status)     COVID-19 virus detected     Dementia     Hyperlipidemia     Brain bleed     Benign essential HTN     DM2 (diabetes mellitus, type 2)        Treatment Plan  Patient is a 78-year-old male with a history of hypertension, diabetes, stroke, dementia who was recently diagnosed with hemorrhagic stroke about 10 days ago.  Patient is now on  admission for altered mental status, and was found to be COVID-19 positive.  Neurosurgery consulted and suggestion was that the new AMS was likely secondary to COVID-19 infection.  Chest x-ray shows no evidence of an acute disease in the lungs.  Patient is on room air and satting in the 90s.  Neurology has seen patient and has signed off.    Encephalopathy is likely multifactorial, underlying dementia and COVID-19 infection.  Patient is now at baseline terms of mental status.    Dementia: Continue memantine    COVID-19 infection: No respiratory issues.  Currently on zinc to 20 mg daily.    Diabetes: Serum glucose has been on the high side.  Start basal insulin at night.  Continue insulin sliding scale.  Monitor serum glucose.    SLP evaluated patient and parents diet    Continue PT    Reviewed case management note.  Looks like patient's daughter wants patient to go home with home health services.    SCDs for DVT prophylaxis    Medical Decision Making  Number and Complexity of problems: 2 acute medical problems with high complexity.  Chronic medical issues.  Differential Diagnosis: As above    Conditions and Status        Condition is improving.     [facility] Data  External documents reviewed: Epic records  Cardiac tracing (EKG, telemetry) interpretation: EKG reviewed  Radiology interpretation: MRI brain, chest x-ray, CTA head and neck, CT head without contrast, CT cerebral perfusion.  Labs reviewed: Reviewed  Any tests that were considered but not ordered: None     Decision rules/scores evaluated (example [id number], Wells, etc): N/A     Discussed with: Nursing staff     Care Planning  Shared decision making: Unable to discuss with patient due to dementia.    Code status and discussions:   Code Status and Medical Interventions:   Ordered at: 08/31/23 2691     Level Of Support Discussed With:    Health Care Surrogate     Code Status (Patient has no pulse and is not breathing):    CPR (Attempt to Resuscitate)     Medical  Interventions (Patient has pulse or is breathing):    Full Support         Disposition  Social Determinants of Health that impact treatment or disposition: Dementia  I expect the patient to be discharged to  in 1 day.     Electronically signed by Raleigh Art MD, 09/04/23, 13:33 CDT.

## 2023-09-04 NOTE — PLAN OF CARE
Goal Outcome Evaluation:   Pt alert and oriented x1. VSS. no c/o pain. WILLIS. PPP. SCDS for VTE. . Tolerating regular diet. Skin intact coccyx is red but blanchable. Voiding via inconstant Last BM 9/1/23. BS monitoring AC/HS Isolation enhanced droplet ( COVID) D/c plans. Call light within reach. Safety maintained.

## 2023-09-05 ENCOUNTER — READMISSION MANAGEMENT (OUTPATIENT)
Dept: CALL CENTER | Facility: HOSPITAL | Age: 78
End: 2023-09-05
Payer: MEDICARE

## 2023-09-05 ENCOUNTER — HOME HEALTH ADMISSION (OUTPATIENT)
Dept: HOME HEALTH SERVICES | Facility: HOME HEALTHCARE | Age: 78
End: 2023-09-05
Payer: MEDICARE

## 2023-09-05 VITALS
SYSTOLIC BLOOD PRESSURE: 137 MMHG | HEART RATE: 81 BPM | RESPIRATION RATE: 16 BRPM | DIASTOLIC BLOOD PRESSURE: 80 MMHG | WEIGHT: 165.6 LBS | HEIGHT: 69 IN | OXYGEN SATURATION: 96 % | TEMPERATURE: 98.9 F | BODY MASS INDEX: 24.53 KG/M2

## 2023-09-05 LAB
GLUCOSE BLDC GLUCOMTR-MCNC: 148 MG/DL (ref 70–130)
GLUCOSE BLDC GLUCOMTR-MCNC: 234 MG/DL (ref 70–130)
QT INTERVAL: 392 MS
QTC INTERVAL: 429 MS

## 2023-09-05 PROCEDURE — 63710000001 INSULIN LISPRO (HUMAN) PER 5 UNITS: Performed by: INTERNAL MEDICINE

## 2023-09-05 PROCEDURE — 82948 REAGENT STRIP/BLOOD GLUCOSE: CPT

## 2023-09-05 RX ORDER — METFORMIN HYDROCHLORIDE 500 MG/1
1000 TABLET, EXTENDED RELEASE ORAL
Qty: 60 TABLET | Refills: 0 | Status: SHIPPED | OUTPATIENT
Start: 2023-09-05 | End: 2023-10-05

## 2023-09-05 RX ADMIN — ASPIRIN 81 MG: 81 TABLET, CHEWABLE ORAL at 10:17

## 2023-09-05 RX ADMIN — Medication 10 ML: at 10:18

## 2023-09-05 RX ADMIN — MEMANTINE HYDROCHLORIDE 2.5 MG: 5 TABLET, FILM COATED ORAL at 10:17

## 2023-09-05 RX ADMIN — OXYCODONE HYDROCHLORIDE AND ACETAMINOPHEN 500 MG: 500 TABLET ORAL at 10:17

## 2023-09-05 RX ADMIN — ZINC SULFATE 220 MG (50 MG) CAPSULE 220 MG: CAPSULE at 10:17

## 2023-09-05 RX ADMIN — INSULIN LISPRO 3 UNITS: 100 INJECTION, SOLUTION INTRAVENOUS; SUBCUTANEOUS at 13:04

## 2023-09-05 NOTE — DISCHARGE SUMMARY
[facility] Medicine Services  DISCHARGE SUMMARY       Date of Admission: 8/31/2023  Date of Discharge:  9/5/2023  Primary Care Physician: Provider, No Known    Presenting Problem/History of Present Illness: AMS  Adapted from H&P from 8/31/2023    Patient unable to give any reliable ROS or provide any history.  He is confused.  Somewhat of a confusing story in general.  Patient presents to the ER today from [facility].  He apparently had recently been admitted to [place] for a reported brain bleed with a platelet count of 20.  He had been on aspirin and Plavix which were discontinued.  He presents here today from the nursing facility for an acute change in mental status.  Apparently around 3 PM he became confused and combative.  Granddaughter states he was fine prior to that.  She denies any history of severe cognitive issues.  She says he is a little forgetful at times.  Nursing home has documented history of dementia history and he is on Namenda.  Granddaughter seems to be unaware of this diagnosis.  Other history includes hypertension, diabetes, TIA.  In the ER he was afebrile without a white count elevation.  Urinalysis without signs of infection.  Chest x-ray clear.  Head CT showed what appears to be subacute intra-axial hemorrhage within the left basal ganglia some mass effect and effacement of the frontal horn.  Some other question of possible M2 vessel clot versus intra-axial hemorrhage.  Per ER provider call was made to Dr. Anna of neurosurgery who stated the bleed was not new and had no acute interventional needs.  Call was also made from ER provider to Dr. Helms of neurology due to concern for stroke.  Reported recommendation to admit and get an MRI.  Was called for admission and by time I saw the patient he was already up in 348.  Granddaughter at bedside.  He is awake and interactive.  He denies any complaints.  Denies headache, chest  pain, shortness of breath, weakness.  Says he feels fine.  He is not combative but patient is only oriented to himself and does not appear to be reliable historian.  He is slow to respond to questions and commands but is following commands.  Good  bilaterally.  Moving legs bilaterally.     Final Discharge Diagnoses:  Active Hospital Problems    Diagnosis     **AMS (altered mental status)     COVID-19 virus detected     Dementia     Hyperlipidemia     Brain bleed     Benign essential HTN     DM2 (diabetes mellitus, type 2)        Consults: Neurosurgery, neurology    Procedures Performed: None    Pertinent Test Results:       Imaging Results (All)       Procedure Component Value Units Date/Time    MRI Brain Without Contrast [[id number]] Collected: 09/01/23 1630     Updated: 09/01/23 1637    Narrative:      EXAMINATION: MRI BRAIN WO CONTRAST-     9/1/2023 3:53 PM CDT     HISTORY: Stroke, follow up; R41.82-Altered mental status, unspecified;  Z74.09-Other reduced mobility; R13.13-Dysphagia, pharyngeal phase     Noncontrast MR imaging of the brain.     Comparison is made with the noncontrast head CT performed yesterday at  5:38 PM.     Left basal ganglia 28 x 18 x 21 mm T1 hyperintense focus representing  focal parenchymal hemorrhage.     Moderate diffuse atrophy and extensive small vessel disease.     Old ischemic changes noted in the right parietal lobe.     Trace amount of intraventricular hemorrhage layers dependently.     No midline shift.     Sinuses stable compared with yesterday.     Summary:  1. Left basal ganglia intraparenchymal hemorrhage measuring 2-3 cm.  Associated trace amount of intraventricular hemorrhage.  2. Extensive atrophy and small vessel disease.   3. Old ischemic changes in the right parietal lobe.        This report was finalized on 09/01/2023 16:34 by Dr. Emil Gonzalez MD.    XR Chest 1 View [[id number]] Collected: 08/31/23 1857     Updated: 08/31/23 1900    Narrative:      EXAMINATION:  Chest 1 view 08/31/2023     HISTORY: Acute stroke protocol.     FINDINGS: Upright frontal projection of the chest demonstrates mild  cardiomegaly. The thoracic aorta and great vessels are ectatic. Lungs  are fully expanded and clear. No consolidative pneumonia or effusion.       Impression:      1. No acute disease.  This report was finalized on 08/31/2023 18:57 by Dr. Martinez Gonzales MD.    CT CEREBRAL PERFUSION WITH & WITHOUT CONTRAST [[id number]] Collected: 08/31/23 1838     Updated: 08/31/23 1845    Narrative:      Indication: Confusion     Exam:      1. Perfusion CT is performed to acquire images tracking the temporal  course of iodinated contrast material passing through the cerebral  circulation. Perfusion parameters, such as cerebral blood flow (CBF),  cerebral blood volume (CBV), mean transit time (MTT), etc. are  calculated by [employer] with additional provided perfusion maps and  estimated stroke volumes.  2. Automated exposure control (AEC) protocols are utilized on the  scanner to ensure dose lowered technique.      Comparison: Noncontrast CT brain and brain angiogram dated 8/31/2023  5:42 PM CDT     Findings:     Abnormal perfusion exam, with size and distribution of the perfusion  abnormality described below.     Distribution: Perfusion abnormality involves the left basal ganglia and  a site of recent intra-axial hemorrhage..     Tmax >6.0 seconds volume: 11 ml     CBF < 30% volume: 0 ml     Mismatch volume: 11 ml      Mismatch ratio: Infinite       Impression:      Impression:  1. There is noted to be delayed transit time of contrast in the left  basal ganglia at the site of a recent intra-axial hemorrhage. There is  associated edema and some mass effect with subtle effacement of the  frontal horn of the left lateral ventricle at this site related to the  recent hemorrhage likely accounting for the delayed perfusion. I do not  see evidence of core infarct or significant penumbra to suggest  large  vessel occlusion/acute ischemia.  This report was finalized on 08/31/2023 18:42 by Dr. Martinez Gonzales MD.    CT Angiogram Head w AI Analysis of LVO [[id number]] Collected: 08/31/23 1818     Updated: 08/31/23 1841    Narrative:      EXAMINATION: CT angiogram of the neck and head with contrast with AI  analysis for LVO. 08/31/2023     HISTORY: Acute stroke suspected. Neurologic deficit.     DOSE: 414 mGycm. All CT scans are performed using dose optimization  techniques as appropriate to the performed exam and including at least  one of the following: Automated exposure control, adjustment of the mA  and/or kV according to size, and the use of the iterative reconstruction  technique..     FINDINGS: Multiple contiguous axial images are obtained from the aortic  arch through the vertex following intravenous contrast infusion with  reformatted images obtained in the sagittal and coronal projections from  the original data set. MIPS are also obtained.     There is atheromatous calcification of the thoracic aorta and proximal  great vessels without evidence of aneurysm or dissection. No rate  limiting stenosis at the origin of the great vessels. There are  emphysematous changes of the lungs. The thyroid is homogeneous in  density without evidence of enlargement or nodularity. Level of the true  and false cords is unremarkable. No pathologically enlarged cervical  chain or posterior triangle lymphadenopathy is present.     The origin of the vertebral arteries are widely patent. The left  vertebral artery is dominant. There is mild calcific plaquing involving  the intracranial segment of the left vertebral artery without rate  limiting stenosis. Both vertebral arteries are otherwise widely patent  to the basilar artery.     There is scattered plaquing involving the right common carotid artery  without evidence of rate limiting stenosis. There is calcific plaquing  involving the carotid bifurcation with involvement of  the carotid bulb  and proximal ICA. This results in an approximate 40% cross-sectional  stenosis within the proximal right ICA. The right ICA is tortuous but  otherwise widely patent to the skull base.     Examination of the left common carotid artery demonstrates scattered  plaquing without rate limiting stenosis. There is calcific plaquing  involving the carotid bulb and proximal left ICA with a less than 25%  cross-sectional stenosis at the level of the left carotid bulb. The left  ICA is tortuous.     Examination of the [place] including AI analysis for LVO is  also performed including MIPS.     The left vertebral artery is dominant. Mild calcific plaquing involving  the intracranial segment of the left vertebral artery does not result in  significant stenosis. Both vertebral arteries are patent to the basilar  artery. The posterior inferior cerebellar arteries are normal in  appearance. There is mild stenosis involving the mid segment of the  basilar artery. The superior cerebellar artery are widely patent. There  is a persistent fetal origin of the right posterior cerebral artery. The  left posterior cerebral artery emanates from the distal basilar artery  with a patent posterior communicating artery also present and  contributing to the posterior circulation.     Both distal extracranial ICAs are widely patent. There is calcific  plaquing involving the petrous segment of the right ICA without rate  limiting stenosis. There is calcific plaquing involving the cavernous  segment of both ICAs with mild associated stenosis. There is also  calcific plaquing involving the distal cavernous and proximal  supraclinoid segment of both ICAs with mild associated bilateral  stenosis. Both vessels are patent to the terminus. The anterior and  middle cerebral arteries are normal in appearance with no evidence of  intraluminal thrombus or berry aneurysm. No focal steno-occlusive  disease. Previously described focus  of linear density within the mesial  left temporal lobe appears to be separable from the left middle cerebral  artery branches       Impression:      1.. Calcific plaquing involving the intracranial segment of the left  vertebral artery without evidence of rate limiting stenosis. There is a  small short segment stenosis within the basilar artery above the  anterior inferior cerebellar artery origin. The superior cerebellar  arteries are widely patent. The right posterior cerebral artery  represents a persistent fetal origin from the anterior circulation. The  left PCA emanates from the basilar artery but also has a contribution  from the left posterior communicating artery.  2. Calcific plaquing involving the petrous segment of the right ICA as  well as the cavernous and proximal supraclinoid segment of both ICAs  with mild associated stenosis. Both ICAs are patent to the terminus. The  anterior and middle cerebral arteries are normal in appearance with no  intraluminal thrombus or berry aneurysm demonstrated. No focal stenosis  is appreciated within the anterior or middle cerebral arteries.  3. 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  This is slightly more prominent on the right than on the left with an  approximate 40% cross-sectional stenosis within the proximal right ICA  and a less than 25% cross-sectional stenosis at the level of the left  carotid bulb. The more distal extracranial ICAs are tortuous but  otherwise patent.  This report was finalized on 08/31/2023 18:37 by Dr. Martinez Gonzales MD.    CT Angiogram Neck [[id number]] Collected: 08/31/23 1818     Updated: 08/31/23 1841    Narrative:      EXAMINATION: CT angiogram of the neck and head with contrast with AI  analysis for LVO. 08/31/2023     HISTORY: Acute stroke suspected. Neurologic deficit.     DOSE: 414 mGycm. All CT scans are performed using dose optimization  techniques as appropriate to the performed exam and  including at least  one of the following: Automated exposure control, adjustment of the mA  and/or kV according to size, and the use of the iterative reconstruction  technique..     FINDINGS: Multiple contiguous axial images are obtained from the aortic  arch through the vertex following intravenous contrast infusion with  reformatted images obtained in the sagittal and coronal projections from  the original data set. MIPS are also obtained.     There is atheromatous calcification of the thoracic aorta and proximal  great vessels without evidence of aneurysm or dissection. No rate  limiting stenosis at the origin of the great vessels. There are  emphysematous changes of the lungs. The thyroid is homogeneous in  density without evidence of enlargement or nodularity. Level of the true  and false cords is unremarkable. No pathologically enlarged cervical  chain or posterior triangle lymphadenopathy is present.     The origin of the vertebral arteries are widely patent. The left  vertebral artery is dominant. There is mild calcific plaquing involving  the intracranial segment of the left vertebral artery without rate  limiting stenosis. Both vertebral arteries are otherwise widely patent  to the basilar artery.     There is scattered plaquing involving the right common carotid artery  without evidence of rate limiting stenosis. There is calcific plaquing  involving the carotid bifurcation with involvement of the carotid bulb  and proximal ICA. This results in an approximate 40% cross-sectional  stenosis within the proximal right ICA. The right ICA is tortuous but  otherwise widely patent to the skull base.     Examination of the left common carotid artery demonstrates scattered  plaquing without rate limiting stenosis. There is calcific plaquing  involving the carotid bulb and proximal left ICA with a less than 25%  cross-sectional stenosis at the level of the left carotid bulb. The left  ICA is tortuous.      Examination of the [place] including AI analysis for LVO is  also performed including MIPS.     The left vertebral artery is dominant. Mild calcific plaquing involving  the intracranial segment of the left vertebral artery does not result in  significant stenosis. Both vertebral arteries are patent to the basilar  artery. The posterior inferior cerebellar arteries are normal in  appearance. There is mild stenosis involving the mid segment of the  basilar artery. The superior cerebellar artery are widely patent. There  is a persistent fetal origin of the right posterior cerebral artery. The  left posterior cerebral artery emanates from the distal basilar artery  with a patent posterior communicating artery also present and  contributing to the posterior circulation.     Both distal extracranial ICAs are widely patent. There is calcific  plaquing involving the petrous segment of the right ICA without rate  limiting stenosis. There is calcific plaquing involving the cavernous  segment of both ICAs with mild associated stenosis. There is also  calcific plaquing involving the distal cavernous and proximal  supraclinoid segment of both ICAs with mild associated bilateral  stenosis. Both vessels are patent to the terminus. The anterior and  middle cerebral arteries are normal in appearance with no evidence of  intraluminal thrombus or berry aneurysm. No focal steno-occlusive  disease. Previously described focus of linear density within the mesial  left temporal lobe appears to be separable from the left middle cerebral  artery branches       Impression:      1.. Calcific plaquing involving the intracranial segment of the left  vertebral artery without evidence of rate limiting stenosis. There is a  small short segment stenosis within the basilar artery above the  anterior inferior cerebellar artery origin. The superior cerebellar  arteries are widely patent. The right posterior cerebral artery  represents a  persistent fetal origin from the anterior circulation. The  left PCA emanates from the basilar artery but also has a contribution  from the left posterior communicating artery.  2. Calcific plaquing involving the petrous segment of the right ICA as  well as the cavernous and proximal supraclinoid segment of both ICAs  with mild associated stenosis. Both ICAs are patent to the terminus. The  anterior and middle cerebral arteries are normal in appearance with no  intraluminal thrombus or berry aneurysm demonstrated. No focal stenosis  is appreciated within the anterior or middle cerebral arteries.  3. 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  This is slightly more prominent on the right than on the left with an  approximate 40% cross-sectional stenosis within the proximal right ICA  and a less than 25% cross-sectional stenosis at the level of the left  carotid bulb. The more distal extracranial ICAs are tortuous but  otherwise patent.  This report was finalized on 08/31/2023 18:37 by Dr. Martinez Gonzales MD.    CT Head Without Contrast Stroke Protocol [[id number]] Collected: 08/31/23 1751     Updated: 08/31/23 1807    Narrative:      CT HEAD WO CONTRAST STROKE PROTOCOL- 8/31/2023 5:40 PM CDT     HISTORY: Neuro deficit, acute, stroke suspected     COMPARISON: None      DLP: 748 mGy cm. All CT scans are performed using dose optimization  techniques as appropriate to the performed exam and including at least  one of the following: Automated exposure control, adjustment of the mA  and/or kV according to size, and the use of the iterative reconstruction  technique.     TECHNIQUE: Serial axial tomographic images of the brain were obtained  without the use of intravenous contrast.      FINDINGS:   The midline structures are nondisplaced. There is diffuse atrophy of the  brain with prominence of the subarachnoid spaces and ventricular  enlargement. There is evidence of a previous posterior division  right  MCA territory infarct with encephalomalacia within the right temporal  and parietal lobe. A remote infarct involving the right basal ganglia  and left thalamus are also present.     There is a subtle focus of increased density within the left basal  ganglia. This has ill-defined margins measuring approximately 2.6 x 2.0  cm in size with subtle effacement of the frontal horn of the left  lateral ventricle. I suspect this may represent a focus of subacute  hemorrhage. There is evidence of intraventricular hemorrhage with blood  layering dependently within the posterior horn of both lateral  ventricles. No evidence of acute intra-axial or extra-axial hemorrhage.  There is also evidence of a previous right frontal lobe infarct or  posttraumatic change with encephalomalacia.     Noted on image 13 of series 5 and image 12 of series 9 is focal more  linear hyperdensity within the mesial left temporal lobe. This is in  close proximity to the M2 segments of the left MCA and could potentially  represent some clot within one of these vessels. CT angiography may be  helpful for better delineation.     The orbits are unremarkable.     The visualized paranasal sinuses and mastoid air cells are normally  aerated.       Impression:      1.. Subtle hyperdensity within the left basal ganglia with some mass  effect and mild effacement of the frontal horn of the left lateral  ventricle. Radiographically I would favor that this represents a focus  of subacute intra-axial hemorrhage. There is evidence of blood layering  dependently within the posterior horn of both lateral ventricles. No  evidence of acute appearing intra or extra-axial hemorrhage although the  blood layering within the posterior horn of the left lateral ventricle  is relatively hyperdense.  2. Focal hyperdensity which is more linear distribution within the  mesial left temporal lobe. This could potentially represent some  hyperdense clot within a left M2 vessel  versus an additional small focus  of intra-axial hemorrhage. This is different in density in comparison  with the adjacent hemorrhage in the left basal ganglia. CT angiography  may be helpful for further characterization.  3. Diffuse atrophy and small vessel disease. Evidence of previous  infarcts involving the right frontal lobe as well as the right temporal  and parietal lobe in the MCA distribution. Remote right basal ganglia  and left thalamic lacunar infarcts are also present.     This report was finalized on 08/31/2023 18:03 by Dr. Martinez Gonzales MD.          LAB RESULTS:      Lab 09/01/23 0454 08/31/23 1823 08/31/23 1815 08/30/23  1030   WBC 8.62  --  9.93 9.43   HEMOGLOBIN 15.4  --  15.0 15.1   HEMATOCRIT 49.2  --  46.5 47.6   PLATELETS 149  --  170 199   NEUTROS ABS  --   --  8.20* 6.90   IMMATURE GRANS (ABS)  --   --  0.03 0.02   LYMPHS ABS  --   --  0.82 1.74   MONOS ABS  --   --  0.84 0.55   EOS ABS  --   --  0.01 0.19   MCV 93.2  --  91.9 92.6   PROTIME  --  12.7  --   --    APTT  --  30.1  --   --          Lab 09/01/23 0454 08/31/23 1928 08/31/23 1815 08/30/23  1030   SODIUM 133* 134*  --  139   POTASSIUM 4.3 4.6  --  4.2   CHLORIDE 98 96*  --  98   CO2 22.0 26.0  --  28.0   ANION GAP 13.0 12.0  --  13.0   BUN 18 22  --  24*   CREATININE 0.77 0.82  --  0.81   EGFR 90.5 88.8  --  89.1   GLUCOSE 185* 286*  --  268*   CALCIUM 9.9 9.9  --  10.5   MAGNESIUM 2.0  --   --   --    HEMOGLOBIN A1C  --   --  8.00*  --    TSH 2.920  --   --   --          Lab 09/01/23 0454 08/31/23 1928 08/30/23  1030   TOTAL PROTEIN 7.2 7.3 6.8   ALBUMIN 4.0 4.5 4.6   GLOBULIN 3.2 2.8 2.2   ALT (SGPT) 16 19 17   AST (SGOT) 18 18 14   BILIRUBIN 0.5 0.5 0.5   ALK PHOS 84 87 92         Lab 08/31/23 1928 08/31/23 1823   HSTROP T 34*  --    PROTIME  --  12.7   INR  --  0.94         Lab 09/01/23  0454   CHOLESTEROL 126   LDL CHOL 66   HDL CHOL 47   TRIGLYCERIDES 59         Lab 08/31/23  1931   ABO TYPING O   RH TYPING  Positive   ANTIBODY SCREEN Negative         Brief Urine Lab Results  (Last result in the past 365 days)        Color   Clarity   Blood   Leuk Est   Nitrite   Protein   CREAT   Urine HCG        08/31/23 1841 Yellow   Clear   Negative   Negative   Negative   Negative                 Microbiology Results (last 10 days)       Procedure Component Value - Date/Time    Respiratory Panel PCR w/COVID-19(SARS-CoV-2) SERA/BRUNO/WHIT/PAD/COR/MAD/NASIM In-House, NP Swab in UTM/VTM, 3-4 HR TAT - Swab, Nasopharynx [[id number]]  (Abnormal) Collected: 08/31/23 2055    Lab Status: Final result Specimen: Swab from Nasopharynx Updated: 08/31/23 2203     ADENOVIRUS, PCR Not Detected     Coronavirus 229E Not Detected     Coronavirus HKU1 Not Detected     Coronavirus NL63 Not Detected     Coronavirus OC43 Not Detected     COVID19 Detected     Human Metapneumovirus Not Detected     Human Rhinovirus/Enterovirus Not Detected     Influenza A PCR Not Detected     Influenza B PCR Not Detected     Parainfluenza Virus 1 Not Detected     Parainfluenza Virus 2 Not Detected     Parainfluenza Virus 3 Not Detected     Parainfluenza Virus 4 Not Detected     RSV, PCR Not Detected     Bordetella pertussis pcr Not Detected     Bordetella parapertussis PCR Not Detected     Chlamydophila pneumoniae PCR Not Detected     Mycoplasma pneumo by PCR Not Detected    Narrative:      In the setting of a positive respiratory panel with a viral infection PLUS a negative procalcitonin without other underlying concern for bacterial infection, consider observing off antibiotics or discontinuation of antibiotics and continue supportive care. If the respiratory panel is positive for atypical bacterial infection (Bordetella pertussis, Chlamydophila pneumoniae, or Mycoplasma pneumoniae), consider antibiotic de-escalation to target atypical bacterial infection.            Hospital Course: Patient is a 78-year-old male with a history of hypertension, diabetes, stroke, dementia who  "was recently diagnosed with hemorrhagic stroke about 10 days ago.  Patient admitted for altered mental status, and was found to be secondary to COVID-19 infection.  Neurosurgery consulted and suggestion was that the new AMS was likely secondary to COVID-19 infection.  Patient continued to improve.  Patient was discharged home her granddaughters request.  On the day of discharge, I spoke with patient's granddaughter and updated her on plans to discharge patient.       Physical Exam on Discharge:  /80 (BP Location: Left arm, Patient Position: Lying)   Pulse 81   Temp 98.9 °F (37.2 °C) (Temporal)   Resp 16   Ht 175.3 cm (69\")   Wt 75.1 kg (165 lb 9.6 oz)   SpO2 96%   BMI 24.45 kg/m²   Physical Exam  Vitals and nursing note reviewed.   Constitutional:       General: He is not in acute distress.     Appearance: He is not diaphoretic.   HENT:      Head: Normocephalic and atraumatic.      Right Ear: External ear normal.      Left Ear: External ear normal.   Cardiovascular:      Rate and Rhythm: Normal rate and regular rhythm.      Heart sounds: Normal heart sounds.   Pulmonary:      Effort: Pulmonary effort is normal. No respiratory distress.      Breath sounds: Wheezing present.   Chest:      Chest wall: No tenderness.   Abdominal:      General: Bowel sounds are normal.      Palpations: Abdomen is soft.      Tenderness: There is no abdominal tenderness.   Musculoskeletal:         General: No swelling or tenderness.      Cervical back: Normal range of motion and neck supple.      Right lower leg: No edema.      Left lower leg: No edema.   Skin:     General: Skin is warm and dry.      Capillary Refill: Capillary refill takes less than 2 seconds.      Findings: No erythema or rash.   Neurological:      Mental Status: He is alert. Mental status is at baseline. He is disoriented.      Motor: No weakness.   Psychiatric:         Behavior: Behavior normal.         Condition on Discharge: Stable and " improving    Discharge Disposition:  [place] [facility] [place]    Discharge Medications:     Discharge Medications        New Medications        Instructions Start Date   metFORMIN  MG 24 hr tablet  Commonly known as: GLUCOPHAGE-XR  Notes to patient: Next dose due tonight 9-5   1,000 mg, Oral, Daily With Breakfast             Continue These Medications        Instructions Start Date   amLODIPine 10 MG tablet  Commonly known as: NORVASC  Notes to patient: Next dose due in am 9-6   10 mg, Oral, Daily      atorvastatin 20 MG tablet  Commonly known as: LIPITOR  Notes to patient: Next dose due in am 9-6   20 mg, Oral, Daily      bisacodyl 10 MG suppository  Commonly known as: DULCOLAX   10 mg, Rectal, Daily PRN      docusate sodium 100 MG capsule  Commonly known as: COLACE  Notes to patient: Next dose due tonight 9-5   100 mg, Oral, 2 Times Daily      losartan 100 MG tablet  Commonly known as: COZAAR  Notes to patient: Next dose due in am 9-6   100 mg, Oral, Daily      memantine 7 MG capsule sustained-release 24 hr extended release capsule  Commonly known as: NAMENDA XR  Notes to patient: Next dose due in am 9-6   7 mg, Oral, Daily             Stop These Medications      insulin glargine 100 UNIT/ML injection  Commonly known as: LANANDREINA SEMGLEE              Discharge Diet:   Diet Instructions       Diet: Diabetic Diets; Consistent Carbohydrate; Regular Texture (IDDSI 7); Thin (IDDSI 0)      Discharge Diet: Diabetic Diets    Diabetic Diet: Consistent Carbohydrate    Texture: Regular Texture (IDDSI 7)    Fluid Consistency: Thin (IDDSI 0)            Activity at Discharge:   Activity Instructions       Up WIth Assist              Follow-up Appointments:   Future Appointments   Date Time Provider Department Center   9/19/2023 12:15 PM Bob Andrews APRN [facility]   10/25/2023  3:30 PM Rory Andres APRN [facility]       Test Results Pending at Discharge: none    Electronically signed by Raleigh Art  MD, 09/05/23, 19:19 CDT.    Time: 32 minutes.

## 2023-09-05 NOTE — CASE MANAGEMENT/SOCIAL WORK
Continued Stay Note  [facility]     Patient Name: Bert Pereira  MRN: [id number]  Today's Date: 9/5/2023    Admit Date: 8/31/2023    Plan: Home Health   Discharge Plan       Row Name 09/05/23 1358       Plan    Final Discharge Disposition Code 06 - home with home health care    Final Note Pt is being dcd home with grandtr today. JENNIFFER spoke to pt grandtr (Carmen) and she is requesting a rolling walker and bedside commode. Will order DME from [facility] and have it delivered to room. Carmen is agreeable to [facility]. JENNIFFER has messaged Adelina with [facility] to see if they can accept the referral. Carmen states her home address is [address] [place], [place].                   Discharge Codes    No documentation.                 Expected Discharge Date and Time       Expected Discharge Date Expected Discharge Time    Sep 5, 2023               BANDAR Escalante

## 2023-09-05 NOTE — THERAPY TREATMENT NOTE
Acute Care - Physical Therapy Treatment Note  [facility]     Patient Name: Bert Pereira  : 1945  MRN: [id number]  Today's Date: 2023      Visit Dx:     ICD-10-CM ICD-9-CM   1. Altered mental status, unspecified altered mental status type  R41.82 780.97   2. Impaired mobility [Z74.09 (ICD-10-CM)]  Z74.09 799.89   3. Dysphagia, pharyngeal phase  R13.13 787.23     Patient Active Problem List   Diagnosis    AMS (altered mental status)    COVID-19 virus detected    Dementia    Hyperlipidemia    Brain bleed    Benign essential HTN    DM2 (diabetes mellitus, type 2)     Past Medical History:   Diagnosis Date    Constipation, unspecified     Diabetes     Essential (primary) hypertension 2023    Hemiplegia, unspecified affecting right dominant side 2023    History of falling     Hyperlipidemia, unspecified 2023    Nontraumatic intracerebral hemorrhage, unspecified 2023    Personal history of transient ischemic attack (TIA), and cerebral infarction without residual deficits     Unspecified dementia, unspecified severity, without behavioral disturbance, psychotic disturbance, mood disturbance, and anxiety      History reviewed. No pertinent surgical history.  PT Assessment (last 12 hours)       PT Evaluation and Treatment       Row Name 23          Physical Therapy Time and Intention    Subjective Information no complaints  -AE     Document Type therapy note (daily note)  -AE     Mode of Treatment physical therapy  -AE       Row Name 23 08          General Information    Existing Precautions/Restrictions fall  -AE       Row Name 23          Pain    Pretreatment Pain Rating 0/10 - no pain  -AE     Posttreatment Pain Rating 0/10 - no pain  -AE       Row Name 23          Bed Mobility    Supine-Sit [place] (Bed Mobility) minimum assist (75% patient effort);verbal cues  -AE     Comment, (Bed Mobility) slow to sit up min x 1 to scoot  -AE       Row  Name 09/05/23 0812          Sit-Stand Transfer    Sit-Stand [place] (Transfers) minimum assist (75% patient effort);verbal cues  -AE       Row Name 09/05/23 0812          Stand-Sit Transfer    Stand-Sit [place] (Transfers) contact guard;verbal cues  -AE       Row Name 09/05/23 0812          Gait/Stairs (Locomotion)    [place] Level (Gait) contact guard  -AE     Assistive Device (Gait) walker, front-wheeled  -AE     Distance in Feet (Gait) 30  x 1 sit rest plus 15  -AE     Deviations/Abnormal Patterns (Gait) nandini decreased  -AE     Bilateral Gait Deviations forward flexed posture  -AE     Comment, (Gait/Stairs) cues for RW placement  -AE       Row Name 09/05/23 0812          Hip (Therapeutic Exercise)    Hip (Therapeutic Exercise) AROM (active range of motion)  -AE     Hip AROM (Therapeutic Exercise) flexion;10 repetitions  -AE       Row Name 09/05/23 0812          Knee (Therapeutic Exercise)    Knee (Therapeutic Exercise) AROM (active range of motion)  -AE     Knee AROM (Therapeutic Exercise) LAQ (long arc quad);10 repetitions;2 sets  -AE       Row Name 09/05/23 0812          Ankle (Therapeutic Exercise)    Ankle (Therapeutic Exercise) AROM (active range of motion)  -AE     Ankle AROM (Therapeutic Exercise) dorsiflexion;plantarflexion;10 repetitions;2 sets  -AE       Row Name 09/05/23 0812          Positioning and Restraints    Pre-Treatment Position in bed  -AE     Post Treatment Position bed   no chair alarm available  -AE     In Bed fowlers;call light within reach;exit alarm on  -AE               User Key  (r) = Recorded By, (t) = Taken By, (c) = Cosigned By      Initials Name Provider Type    AE Brionna Huber PTA Physical Therapist Assistant                    Physical Therapy Education       Title: PT OT SLP Therapies (Done)       Topic: Physical Therapy (Done)       Point: Mobility training (Done)       Learning Progress Summary             Patient Acceptance, E, VU by AE at 9/5/2023 5612     Comment: T/F'S RW bed moblity    Acceptance, E,TB, VU by  at 9/3/2023 1405    Acceptance, E,TB, VU by  at 9/2/2023 1410    Acceptance, E,D, DU,VU by  at 9/1/2023 1045    Comment: benefits of PT and POC, call for A OOB   Family Acceptance, E,TB, VU by  at 9/2/2023 1410                         Point: Precautions (Done)       Learning Progress Summary             Patient Acceptance, E,TB, VU by  at 9/3/2023 1405    Acceptance, E,TB, VU by  at 9/2/2023 1410    Acceptance, E,D, DU,VU by  at 9/1/2023 1045    Comment: benefits of PT and POC, call for A OOB   Family Acceptance, E,TB, VU by  at 9/2/2023 1410                                         User Key       Initials Effective Dates Name Provider Type Discipline    AE 02/03/23 -  Brionna Huber, PTA Physical Therapist Assistant PT     02/03/23 -  Pardeep Wells, PT Physical Therapist PT     07/05/23 -  Gayle Mckinney, RN Registered Nurse Nurse                  PT Recommendation and Plan     Plan of Care Reviewed With: patient  Progress: improving  Outcome Evaluation: Denies any pn. Slow to follow tasks and requires repetitive cues and time to process. Tenzin bed mobility, [place] sit<>stand, amb 30ft x 1 seaated rest plus 15ft in room with RW CGA. Assistance guiding walker. Decreased safety awareness. Granddaughter states he is discharging to her home with HH.   Outcome Measures       Row Name 09/05/23 0900 09/04/23 1400 09/03/23 1018       How much help from another person do you currently need...    Turning from your back to your side while in flat bed without using bedrails? 3  -AE 3  -KJ 2  -LAKIA    Moving from lying on back to sitting on the side of a flat bed without bedrails? 3  -AE 3  -KJ 2  -LAKIA    Moving to and from a bed to a chair (including a wheelchair)? 3  -AE 3  -KJ 2  -LAKIA    Standing up from a chair using your arms (e.g., wheelchair, bedside chair)? 3  -AE 3  -KJ 2  -LAKIA    Climbing 3-5 steps with a railing? 2  -AE 2  -KJ 1  -LAKIA    To  walk in hospital room? 3  -AE 3  -KJ 2  -LAKIA    AM-PAC 6 Clicks Score (PT) 17  -AE 17  -KJ 11  -LAKIA       Functional Assessment    Outcome Measure Options AM-PAC 6 Clicks Basic Mobility (PT)  -AE AM-PAC 6 Clicks Basic Mobility (PT)  -KJ AM-PAC 6 Clicks Basic Mobility (PT)  -LAKIA      Row Name 09/02/23 1350             How much help from another person do you currently need...    Turning from your back to your side while in flat bed without using bedrails? 2  -LAKIA      Moving from lying on back to sitting on the side of a flat bed without bedrails? 2  -LAKIA      Moving to and from a bed to a chair (including a wheelchair)? 2  -LAKIA      Standing up from a chair using your arms (e.g., wheelchair, bedside chair)? 2  -LAKIA      Climbing 3-5 steps with a railing? 1  -LAKIA      To walk in hospital room? 2  -LAKIA      AM-PAC 6 Clicks Score (PT) 11  -LAKIA         Functional Assessment    Outcome Measure Options AM-PAC 6 Clicks Basic Mobility (PT)  -LAKIA                User Key  (r) = Recorded By, (t) = Taken By, (c) = Cosigned By      Initials Name Provider Type    AE Brionna Huber, PTA Physical Therapist Assistant    Gogo Soler, PTA Physical Therapist Assistant    Leoncio Pond, PTA Physical Therapist Assistant                     Time Calculation:    PT Charges       Row Name 09/05/23 0923             Time Calculation    Start Time 0812  -AE      Stop Time 0850  -AE      Time Calculation (min) 38 min  -AE      PT Received On 09/05/23  -AE      PT Goal Re-Cert Due Date 09/11/23  -AE         Time Calculation- PT    Total Timed Code Minutes- PT 38 minute(s)  -AE         Timed Charges    98590 - PT Therapeutic Exercise Minutes 8  -AE      20466 - Gait Training Minutes  15  -AE      85964 - PT Therapeutic Activity Minutes 15  -AE         Total Minutes    Timed Charges Total Minutes 38  -AE       Total Minutes 38  -AE                User Key  (r) = Recorded By, (t) = Taken By, (c) = Cosigned By      Initials Name Provider Type     AE Brionna Huber, HILARY Physical Therapist Assistant                      PT G-Codes  Outcome Measure Options: AM-PAC 6 Clicks Basic Mobility (PT)  AM-PAC 6 Clicks Score (PT): 17  AM-PAC 6 Clicks Score (OT): 12    Brionna Huber, HILARY  9/5/2023

## 2023-09-05 NOTE — PLAN OF CARE
Goal Outcome Evaluation:  Plan of Care Reviewed With: patient        Progress: no change  Outcome Evaluation: Alert, oriented to self only. No c/o pain this shift. Incontinent of bladder, care provided. NIH=1. Call light in reach, safety maintained.

## 2023-09-05 NOTE — PLAN OF CARE
Goal Outcome Evaluation:  Plan of Care Reviewed With: patient        Progress: improving  Outcome Evaluation: Denies any pn. Slow to follow tasks and requires repetitive cues and time to process. Tenzin bed mobility, [place] sit<>stand, amb 30ft x 1 seaated rest plus 15ft in room with RW CGA. Assistance guiding walker. Decreased safety awareness. Granddaughter states he is discharging to her home with HH.

## 2023-09-06 ENCOUNTER — NURSE TRIAGE (OUTPATIENT)
Dept: CALL CENTER | Facility: HOSPITAL | Age: 78
End: 2023-09-06
Payer: MEDICARE

## 2023-09-06 NOTE — THERAPY DISCHARGE NOTE
Acute Care - Physical Therapy Discharge Summary  [facility]       Patient Name: Bert Pereira  : 1945  MRN: [id number]    Today's Date: 2023                 Admit Date: 2023      PT Recommendation and Plan    Visit Dx:    ICD-10-CM ICD-9-CM   1. Altered mental status, unspecified altered mental status type  R41.82 780.97   2. Impaired mobility [Z74.09 (ICD-10-CM)]  Z74.09 799.89   3. Dysphagia, pharyngeal phase  R13.13 787.23   4. Type 2 diabetes mellitus with other specified complication, unspecified whether long term insulin use  E11.69 250.80        Outcome Measures       Row Name 23 0900 23 1400          How much help from another person do you currently need...    Turning from your back to your side while in flat bed without using bedrails? 3  -AE 3  -KJ     Moving from lying on back to sitting on the side of a flat bed without bedrails? 3  -AE 3  -KJ     Moving to and from a bed to a chair (including a wheelchair)? 3  -AE 3  -KJ     Standing up from a chair using your arms (e.g., wheelchair, bedside chair)? 3  -AE 3  -KJ     Climbing 3-5 steps with a railing? 2  -AE 2  -KJ     To walk in hospital room? 3  -AE 3  -KJ     AM-PAC 6 Clicks Score (PT) 17  -AE 17  -KJ        Functional Assessment    Outcome Measure Options AM-PAC 6 Clicks Basic Mobility (PT)  -AE AM-PAC 6 Clicks Basic Mobility (PT)  -KJ               User Key  (r) = Recorded By, (t) = Taken By, (c) = Cosigned By      Initials Name Provider Type    AE Brionna Huber, PTA Physical Therapist Assistant    Gogo Soler, HILARY Physical Therapist Assistant                         PT Rehab Goals       Row Name 23 1400 23 0700          Bed Mobility Goal 1 (PT)    Activity/Assistive Device (Bed Mobility Goal 1, PT) bed mobility activities, all  -AB bed mobility activities, all  -TS     [place] Level/Cues Needed (Bed Mobility Goal 1, PT) minimum assist (75% or more patient effort)  -AB minimum assist (75% or  more patient effort)  -TS     Time Frame (Bed Mobility Goal 1, PT) 10 days  -AB 10 days  -TS     Progress/Outcomes (Bed Mobility Goal 1, PT) goal met  -AB goal not met  -TS        Transfer Goal 1 (PT)    Activity/Assistive Device (Transfer Goal 1, PT) sit-to-stand/stand-to-sit  -AB sit-to-stand/stand-to-sit  -TS     [place] Level/Cues Needed (Transfer Goal 1, PT) minimum assist (75% or more patient effort)  -AB minimum assist (75% or more patient effort)  -TS     Time Frame (Transfer Goal 1, PT) 10 days  -AB 10 days  -TS     Progress/Outcome (Transfer Goal 1, PT) goal met  -AB goal not met  -TS        Gait Training Goal 1 (PT)    Activity/Assistive Device (Gait Training Goal 1, PT) gait (walking locomotion);assistive device use;decrease fall risk  -AB gait (walking locomotion);assistive device use;decrease fall risk  -TS     [place] Level (Gait Training Goal 1, PT) minimum assist (75% or more patient effort)  -AB minimum assist (75% or more patient effort)  -TS     Distance (Gait Training Goal 1, PT) 10ft  -AB 10ft  -TS     Time Frame (Gait Training Goal 1, PT) 10 days  -AB 10 days  -TS     Progress/Outcome (Gait Training Goal 1, PT) goal met  -AB goal not met  -TS               User Key  (r) = Recorded By, (t) = Taken By, (c) = Cosigned By      Initials Name Provider Type Discipline    Tia Almaraz PTA Physical Therapist Assistant PT    TS Melissa Millard COTA Occupational Therapist Assistant OT                        PT Discharge Summary  Anticipated Discharge Disposition (PT): skilled nursing facility  Reason for Discharge: Discharge from facility  Outcomes Achieved: Refer to plan of care for updates on goals achieved  Discharge Destination: Home with home health      Tia Gamble PTA   9/6/2023

## 2023-09-06 NOTE — OUTREACH NOTE
Prep Survey      Flowsheet Row Responses   [facility] facility patient discharged from? [facility]   Is LACE score < 7 ? No   Eligibility Readm Mgmt   Discharge diagnosis AMS   Does the patient have one of the following disease processes/diagnoses(primary or secondary)? Other   Does the patient have Home health ordered? Yes   What is the Home health agency?  [facility]   Is there a DME ordered? Yes   What DME was ordered? rolling walker and bedside commode. Will order DME from [facility]   Prep survey completed? Yes            HARLEY STATON - Registered Nurse

## 2023-09-06 NOTE — THERAPY DISCHARGE NOTE
Acute Care - Occupational Therapy Discharge Summary  [facility]     Patient Name: Bert Pereira  : 1945  MRN: [id number]    Today's Date: 2023                 Admit Date: 2023        OT Recommendation and Plan    Visit Dx:    ICD-10-CM ICD-9-CM   1. Altered mental status, unspecified altered mental status type  R41.82 780.97   2. Impaired mobility [Z74.09 (ICD-10-CM)]  Z74.09 799.89   3. Dysphagia, pharyngeal phase  R13.13 787.23   4. Type 2 diabetes mellitus with other specified complication, unspecified whether long term insulin use  E11.69 250.80                    Outcome Measures       Row Name 23 0900 23 1400 23 1018       How much help from another person do you currently need...    Turning from your back to your side while in flat bed without using bedrails? 3  -AE 3  -KJ 2  -LAKIA    Moving from lying on back to sitting on the side of a flat bed without bedrails? 3  -AE 3  -KJ 2  -LAKIA    Moving to and from a bed to a chair (including a wheelchair)? 3  -AE 3  -KJ 2  -LAKIA    Standing up from a chair using your arms (e.g., wheelchair, bedside chair)? 3  -AE 3  -KJ 2  -LAKIA    Climbing 3-5 steps with a railing? 2  -AE 2  -KJ 1  -LAKIA    To walk in hospital room? 3  -AE 3  -KJ 2  -LAKIA    AM-PAC 6 Clicks Score (PT) 17  -AE 17  -KJ 11  -LAKIA       Functional Assessment    Outcome Measure Options AM-PAC 6 Clicks Basic Mobility (PT)  -AE AM-PAC 6 Clicks Basic Mobility (PT)  -KJ AM-PAC 6 Clicks Basic Mobility (PT)  -LAKIA              User Key  (r) = Recorded By, (t) = Taken By, (c) = Cosigned By      Initials Name Provider Type    AE Brionna Huber, PTA Physical Therapist Assistant    Gogo Soler, PTA Physical Therapist Assistant    Leoncio Pond, PTA Physical Therapist Assistant                            OT Discharge Summary  Anticipated Discharge Disposition (OT): home with home health  Reason for Discharge: Discharge from facility  Outcomes Achieved: Refer to plan of care for  updates on goals achieved  Discharge Destination: Home with home health      GAUDENCIO Pulido  9/6/2023

## 2023-09-06 NOTE — THERAPY DISCHARGE NOTE
Acute Care - Speech Language Pathology Discharge Summary  [facility]       Patient Name: Bert Pereira  : 1945  MRN: [id number]    Today's Date: 2023                   Admit Date: 2023      SLP Recommendation and Plan  Regular solids and thin liquids    Visit Dx:    ICD-10-CM ICD-9-CM   1. Altered mental status, unspecified altered mental status type  R41.82 780.97   2. Impaired mobility [Z74.09 (ICD-10-CM)]  Z74.09 799.89   3. Dysphagia, pharyngeal phase  R13.13 787.23   4. Type 2 diabetes mellitus with other specified complication, unspecified whether long term insulin use  E11.69 250.80                SLP GOALS       Row Name 23 1500             (LTG) Patient will demonstrate functional swallow for    Diet Texture (Demonstrate functional swallow) regular textures  -MB      Liquid viscosity (Demonstrate functional swallow) thin liquids  -MB      [place] (Demonstrate functional swallow) independently (over 90% accuracy)  -MB      Time Frame (Demonstrate functional swallow) 1 week  -MB      Barriers (Demonstrate functional swallow) previous functional deficits  -MB      Progress/Outcomes (Demonstrate functional swallow) goal partially met  -MB         (STG) Patient will tolerate trials of    Consistencies Trialed (Tolerate trials) regular textures;thin liquids  -MB      Desired Outcome (Tolerate trials) without signs/symptoms of aspiration;with use of compensatory strategies (see comments)  -MB      [place] (Tolerate trials) independently (over 90% accuracy)  -MB      Time Frame (Tolerate trials) 1 week  -MB      Progress/Outcomes ([place]olerate trials) goal partially met  -MB         (STG) Swallow Compensatory Strategies Goal 1 (SLP)    Activity (Swallow Compensatory Strategies/Techniques Goal 1, SLP) aspiration precautions;effortful swallow  -MB      [place]/Accuracy (Swallow Compensatory Strategies/Techniques Goal 1, SLP) independently (over 90% accuracy)  -MB      Time Frame  (Swallow Compensatory Strategies/Techniques Goal 1, SLP) 1 week  -MB      Barriers (Swallow Compensatory Strategies/Techniques Goal 1, SLP) previous functional deficits  -MB      Progress/Outcomes (Swallow Compensatory Strategies/Techniques Goal 1, SLP) goal partially met  -MB                User Key  (r) = Recorded By, (t) = Taken By, (c) = Cosigned By      Initials Name Provider Type    Aniceto Hammond CCC-SLP Speech and Language Pathologist                            SLP Discharge Summary  Anticipated Discharge Disposition (SLP): skilled nursing facility  Reason for Discharge: discharge from this facility  Progress Toward Achieving Short/long Term Goals: goals partially met within established timelines  Discharge Destination: home w/ home health      Aniceto Paul CCC-SLP  9/6/2023

## 2023-09-06 NOTE — TELEPHONE ENCOUNTER
Reason for Disposition   [1] Caller has URGENT medicine question about med that PCP or specialist prescribed AND [2] triager unable to answer question    Additional Information   Negative: [1] Intentional drug overdose AND [2] suicidal thoughts or ideas   Negative: Drug overdose and triager unable to answer question   Negative: Caller requesting a renewal or refill of a medicine patient is currently taking   Negative: Caller requesting information unrelated to medicine   Negative: Caller requesting information about COVID-19 Vaccine   Negative: Caller requesting information about Emergency Contraception   Negative: Caller requesting information about Combined Birth Control Pills   Negative: Caller requesting information about Progestin Birth Control Pills   Negative: Caller requesting information about Post-Op pain or medicines   Negative: Caller requesting a prescription antibiotic (such as Penicillin) for Strep throat and has a positive culture result   Negative: Caller requesting a prescription anti-viral med (such as Tamiflu) and has influenza (flu) symptoms   Negative: Immunization reaction suspected   Negative: Rash while taking a medicine or within 3 days of stopping it   Negative: [1] Asthma and [2] having symptoms of asthma (cough, wheezing, etc.)   Negative: [1] Symptom of illness (e.g., headache, abdominal pain, earache, vomiting) AND [2] more than mild   Negative: Breastfeeding questions about mother's medicines and diet   Negative: MORE THAN A DOUBLE DOSE of a prescription or over-the-counter (OTC) drug   Negative: [1] DOUBLE DOSE (an extra dose or lesser amount) of prescription drug AND [2] any symptoms (e.g., dizziness, nausea, pain, sleepiness)   Negative: [1] DOUBLE DOSE (an extra dose or lesser amount) of over-the-counter (OTC) drug AND [2] any symptoms (e.g., dizziness, nausea, pain, sleepiness)   Negative: Took another person's prescription drug   Negative: [1] DOUBLE DOSE (an extra dose or  "lesser amount) of prescription drug AND [2] NO symptoms  (Exception: A double dose of antibiotics.)   Negative: Diabetes drug error or overdose (e.g., took wrong type of insulin or took extra dose)   Negative: [1] Prescription not at pharmacy AND [2] was prescribed by PCP recently (Exception: Triager has access to EMR and prescription is recorded there. Go to Home Care and confirm for pharmacy.)   Negative: [1] Pharmacy calling with prescription question AND [2] triager unable to answer question    Answer Assessment - Initial Assessment Questions  1. NAME of MEDICINE: \"What medicine(s) are you calling about?\"      BP Meds from home  2. QUESTION: \"What is your question?\" (e.g., double dose of medicine, side effect)      Out of them needs refill  3. PRESCRIBER: \"Who prescribed the medicine?\" Reason: if prescribed by specialist, call should be referred to that group.      PCP   4. SYMPTOMS: \"Do you have any symptoms?\" If Yes, ask: \"What symptoms are you having?\"  \"How bad are the symptoms (e.g., mild, moderate, severe)      htn  5. PREGNANCY:  \"Is there any chance that you are pregnant?\" \"When was your last menstrual period?\"      no    Protocols used: Medication Question Call-ADULT-    "

## 2023-09-07 ENCOUNTER — TELEPHONE (OUTPATIENT)
Dept: NEUROLOGY | Facility: CLINIC | Age: 78
End: 2023-09-07
Payer: MEDICARE

## 2023-09-07 ENCOUNTER — HOME CARE VISIT (OUTPATIENT)
Dept: HOME HEALTH SERVICES | Facility: CLINIC | Age: 78
End: 2023-09-07
Payer: MEDICARE

## 2023-09-07 ENCOUNTER — HOME CARE VISIT (OUTPATIENT)
Dept: HOME HEALTH SERVICES | Facility: HOME HEALTHCARE | Age: 78
End: 2023-09-07

## 2023-09-07 PROBLEM — G93.41 METABOLIC ENCEPHALOPATHY: Status: ACTIVE | Noted: 2023-09-07

## 2023-09-07 PROCEDURE — G0299 HHS/HOSPICE OF RN EA 15 MIN: HCPCS

## 2023-09-07 NOTE — Clinical Note
"SOC Note:78 year old male admitted to [facility] post Covid Dx on Aug 31st. Pt was residing with wife in [place]. Daughter relocated them to [place] to help care for them in her home. Pt alert but slow to answer admission questions. Wife present and assisted with admission process. Pt has general weakness. Pt amb short distances with assist and walker use.    Home Health ordered for: disciplines SN, PT, OT    Reason for Hosp/Primary Dx/Co-morbidities: Pt dx on 8/31 with Covid. Pt also has diabetes and HTN/ Pt also has an open skin sheer on right gluteal area.    Focus of Care: COVID 19    Patient's goal(s):\"I want to feel stronger\"    Current Functional status/mobility/DME: General weakness. Amb short distances with walker and assist of 1    HB status/Living Arrangements: Currently living with daughter and hsi wife. Leaving home is a taxing effort due to weakness and immobility.    Skin Integrity/wound status: Intact. Has small skin tear on right buttock.    Code Status: Pt is a full code    Fall Risk/Safety concerns: Pt is at risk for falls    Medication issues/Concerns:Pt does not have script for Amlodipine. Call to MD. Pt must be seen by Dr Turner before script will be written. MD has appointment available tomorrow. Call to daughter to schedule.    Additional Problems/Concerns: Pt also needs bloodsugar monitor. MD will write script when pt is seen in MD office    SDOH Barriers (i.e. caregiver concerns, social isolation, transportation, food insecurity, environment, income etc.)/Need for MSW: [place] to call daughter regarding paid caregivers. Names of local sitting services given to daughter.    Plan for next visit: cp, med compliance, home management of covid 19"

## 2023-09-07 NOTE — TELEPHONE ENCOUNTER
Caller: JONY    Relationship: [facility]    Best call back number: [phone]    Who are you requesting to speak with (clinical staff, provider,  specific staff member): ALYCIA JARRELL    What was the call regarding:   PT IS NOW UNDER HOME HEALTH CARE.

## 2023-09-08 ENCOUNTER — HOME CARE VISIT (OUTPATIENT)
Dept: HOME HEALTH SERVICES | Facility: HOME HEALTHCARE | Age: 78
End: 2023-09-08
Payer: MEDICARE

## 2023-09-08 ENCOUNTER — HOME CARE VISIT (OUTPATIENT)
Dept: HOME HEALTH SERVICES | Facility: HOME HEALTHCARE | Age: 78
End: 2023-09-08

## 2023-09-08 ENCOUNTER — READMISSION MANAGEMENT (OUTPATIENT)
Dept: CALL CENTER | Facility: HOSPITAL | Age: 78
End: 2023-09-08
Payer: MEDICARE

## 2023-09-08 ENCOUNTER — HOME CARE VISIT (OUTPATIENT)
Dept: HOME HEALTH SERVICES | Facility: CLINIC | Age: 78
End: 2023-09-08
Payer: MEDICARE

## 2023-09-08 VITALS
DIASTOLIC BLOOD PRESSURE: 60 MMHG | TEMPERATURE: 97 F | SYSTOLIC BLOOD PRESSURE: 106 MMHG | OXYGEN SATURATION: 98 % | HEART RATE: 60 BPM

## 2023-09-08 VITALS
HEART RATE: 96 BPM | RESPIRATION RATE: 18 BRPM | SYSTOLIC BLOOD PRESSURE: 118 MMHG | OXYGEN SATURATION: 93 % | TEMPERATURE: 97 F | DIASTOLIC BLOOD PRESSURE: 68 MMHG

## 2023-09-08 PROCEDURE — G0151 HHCP-SERV OF PT,EA 15 MIN: HCPCS

## 2023-09-08 NOTE — CASE COMMUNICATION
Appointment made with Dr. Rodney vieira for today at 3pm. Granddaughter unable to get him there today but will call and make appointment for monday.

## 2023-09-08 NOTE — TELEPHONE ENCOUNTER
CALLED BACK AND SPOKE WITH ONE OF THEIR NURSES IN HOME HEALTH AND THEY WERE JUST CALLING TO LET US KNOW. THEY ALSO STATED THEY WERE NEEDING A SIGNING PHYSICIAN BUT SHE STATED HE HAD AN APPOINTMENT WITH DR EDGAR TODAY SO THEY SHOULD BE ABLE TO SIGN

## 2023-09-08 NOTE — OUTREACH NOTE
Medical Week 1 Survey      Flowsheet Row Responses   [facility] facility patient discharged from? [place]   Does the patient have one of the following disease processes/diagnoses(primary or secondary)? Other   Week 1 attempt successful? No   Unsuccessful attempts Attempt 1            [facility] KELLOGG - Registered Nurse

## 2023-09-08 NOTE — HOME HEALTH
Reason for Hosp/Primary Dx : 77 yo male Primary Dx: COVID-19 .  Pt / psp reports hx recent CVA and Covid dx with hospitalization and return to family home 9-5-23       referral orders : physical deconditioning, dementia    Focus of Care: deconditioning, gait, transfers, safety per  dx     Current Functional status/mobility/DME: wc suggested      See DME    HB status/Living Arrangements: lives with family - ramp      Skin Integrity/wound status: na    Code Status: FULL CODE    Fall Risk: high risk    PmHx: hx CVA  R alee -     PLOF : household amb - community prn with A      Infectious disease screen : Pt denies s/s or a exposure to anyone with  s/s of Covid -19     Skills :Education in ther ex to faciltate functional strength for transfers and mobility - reduce fall risk     Education in transfers with hand and AD placement for safety     Education in AD use to promote reduced fall risk - safe functional gait     Education on proper hand/foot placement, transitional movements, and safety awareness to decrease risk of falls

## 2023-09-11 ENCOUNTER — OFFICE VISIT (OUTPATIENT)
Dept: INTERNAL MEDICINE | Facility: CLINIC | Age: 78
End: 2023-09-11
Payer: MEDICARE

## 2023-09-11 VITALS
TEMPERATURE: 97.5 F | HEIGHT: 69 IN | BODY MASS INDEX: 24.44 KG/M2 | SYSTOLIC BLOOD PRESSURE: 112 MMHG | WEIGHT: 165 LBS | HEART RATE: 95 BPM | DIASTOLIC BLOOD PRESSURE: 64 MMHG | OXYGEN SATURATION: 96 %

## 2023-09-11 DIAGNOSIS — E78.5 HYPERLIPIDEMIA LDL GOAL <70: ICD-10-CM

## 2023-09-11 DIAGNOSIS — G30.1 MILD LATE ONSET ALZHEIMER'S DEMENTIA WITHOUT BEHAVIORAL DISTURBANCE, PSYCHOTIC DISTURBANCE, MOOD DISTURBANCE, OR ANXIETY: ICD-10-CM

## 2023-09-11 DIAGNOSIS — E11.9 TYPE 2 DIABETES MELLITUS WITHOUT COMPLICATION, WITHOUT LONG-TERM CURRENT USE OF INSULIN: ICD-10-CM

## 2023-09-11 DIAGNOSIS — I10 BENIGN ESSENTIAL HTN: ICD-10-CM

## 2023-09-11 DIAGNOSIS — Z86.79 HISTORY OF INTRACRANIAL HEMORRHAGE: Primary | ICD-10-CM

## 2023-09-11 DIAGNOSIS — F02.A0 MILD LATE ONSET ALZHEIMER'S DEMENTIA WITHOUT BEHAVIORAL DISTURBANCE, PSYCHOTIC DISTURBANCE, MOOD DISTURBANCE, OR ANXIETY: ICD-10-CM

## 2023-09-11 PROCEDURE — 1111F DSCHRG MED/CURRENT MED MERGE: CPT | Performed by: INTERNAL MEDICINE

## 2023-09-11 PROCEDURE — 3074F SYST BP LT 130 MM HG: CPT | Performed by: INTERNAL MEDICINE

## 2023-09-11 PROCEDURE — 99214 OFFICE O/P EST MOD 30 MIN: CPT | Performed by: INTERNAL MEDICINE

## 2023-09-11 PROCEDURE — 1159F MED LIST DOCD IN RCRD: CPT | Performed by: INTERNAL MEDICINE

## 2023-09-11 PROCEDURE — 3078F DIAST BP <80 MM HG: CPT | Performed by: INTERNAL MEDICINE

## 2023-09-11 PROCEDURE — 1160F RVW MEDS BY RX/DR IN RCRD: CPT | Performed by: INTERNAL MEDICINE

## 2023-09-11 RX ORDER — DIPHENHYDRAMINE HYDROCHLORIDE 25 MG/1
1 CAPSULE, LIQUID FILLED ORAL TAKE AS DIRECTED
Qty: 1 EACH | Refills: 0 | Status: SHIPPED | OUTPATIENT
Start: 2023-09-11

## 2023-09-11 NOTE — HOME HEALTH
Routine Visit Note:SOC. 78 year old male admitted to [facility] due to increased weakness  after Covid 19 diagnosis on 8/31/2023    Skill/education provided: Admission process. Teaching and assessment of medications, safety, home management of covid recovery    Patient/caregiver response: cooperative    Plan for next visit: CP, med compliance    Other pertinent info: Pt was diagnosed with covid 19 on 8/31/2023

## 2023-09-11 NOTE — PROGRESS NOTES
CC: Hospital follow-up for intracranial hemorrhage and altered mental status.    History:  Bert Pereira is a 78 y.o. male who presents today for transitional care management after hospitalization.  He was diagnosed with an intracranial hemorrhage in [place] a few weeks ago and then was sent to a local skilled nursing facility here.  He was sent back into our emergency department and admitted on 8/31 secondary to worsening altered mental status.  He also tested positive for COVID-19.  He was evaluated by neurosurgery and they felt like his worsening mental status was caused by the COVID-19 positivity and not the ICH.  He has been off antiplatelet drugs.    He presents today to follow-up on his recent hospitalizations and also establish care.    He had previously lived independently in [place] with his wife.  He is in [place] for the time being to be assisted by his granddaughter.  The plan is to return to [place] later on if he can recover to that extent.    Home health has been visiting him at his granddaughter's home.  They last visited on Friday.  His granddaughter thinks that he has made significant improvements over the weekend.  He can stand without assistance now.  He is utilizing a rolling walker.  He ambulated from the waiting room to the exam room and back without any additional assistance.    Date of Discharge: 9/5/23.  TCM call successfully made by Bernice Fuller on 9/5.     ROS:  Review of Systems    Mr. Pereira  reports that he has never smoked. He has never used smokeless tobacco. He reports that he does not drink alcohol and does not use drugs.      Current Outpatient Medications:     atorvastatin (LIPITOR) 20 MG tablet, Take 1 tablet by mouth Daily., Disp: , Rfl:     bisacodyl (DULCOLAX) 10 MG suppository, Insert 1 suppository into the rectum Daily As Needed for Constipation., Disp: , Rfl:     docusate sodium (COLACE) 100 MG capsule, Take 1 capsule by mouth 2 (Two) Times a Day.,  "Disp: , Rfl:     losartan (COZAAR) 100 MG tablet, Take 1 tablet by mouth Daily., Disp: , Rfl:     memantine (NAMENDA XR) 7 MG capsule sustained-release 24 hr extended release capsule, Take 1 capsule by mouth Daily., Disp: , Rfl:     metFORMIN ER (GLUCOPHAGE-XR) 500 MG 24 hr tablet, Take 2 tablets by mouth Daily With Breakfast for 30 days., Disp: 60 tablet, Rfl: 0    amLODIPine (NORVASC) 10 MG tablet, Take 1 tablet by mouth Daily. (Patient not taking: Reported on 9/11/2023), Disp: , Rfl:     Blood Glucose Monitoring Suppl (Blood Glucose Monitor System) w/Device kit, Use 1 each Take As Directed., Disp: 1 each, Rfl: 0    glucose blood test strip, Use as instructed. Testing 2 to 3 times daily., Disp: 100 each, Rfl: 1    Lancets 30G misc, Testing 2 to 3 times daily., Disp: 100 each, Rfl: 1    OBJECTIVE:  /64 (BP Location: Left arm, Patient Position: Sitting, Cuff Size: Adult)   Pulse 95   Temp 97.5 °F (36.4 °C) (Infrared)   Ht 175.3 cm (69\")   Wt 74.8 kg (165 lb) Comment: per pt  SpO2 96%   BMI 24.37 kg/m²    Physical Exam  Constitutional:       Comments: Seen and discussed with his granddaughter.   HENT:      Head: Normocephalic and atraumatic.   Eyes:      Conjunctiva/sclera: Conjunctivae normal.      Pupils: Pupils are equal, round, and reactive to light.   Neck:      Vascular: No JVD.   Cardiovascular:      Rate and Rhythm: Normal rate and regular rhythm.      Heart sounds: Normal heart sounds.   Pulmonary:      Effort: Pulmonary effort is normal. No respiratory distress.      Breath sounds: Normal breath sounds.   Abdominal:      General: Bowel sounds are normal. There is no distension.      Palpations: Abdomen is soft.   Musculoskeletal:         General: No swelling.   Skin:     General: Skin is warm and dry.      Findings: No rash.   Neurological:      General: No focal deficit present.      Mental Status: He is alert and oriented to person, place, and time.      Motor: Weakness present.      Gait: " Gait abnormal (using a wheeled walker without much difficulty).   Psychiatric:      Comments: Extremely flat affect.  I was able to get him to laugh once at the end of the visit.  He otherwise stayed quiet and let his granddaughter do most of the talking.     Assessment/Plan  Diagnoses and all orders for this visit:    1. History of intracranial hemorrhage (Primary)    2. Mild late onset Alzheimer's dementia without behavioral disturbance, psychotic disturbance, mood disturbance, or anxiety    3. Benign essential HTN    4. Type 2 diabetes mellitus without complication, without long-term current use of insulin  -     Blood Glucose Monitoring Suppl (Blood Glucose Monitor System) w/Device kit; Use 1 each Take As Directed.  Dispense: 1 each; Refill: 0  -     glucose blood test strip; Use as instructed. Testing 2 to 3 times daily.  Dispense: 100 each; Refill: 1  -     Lancets 30G misc; Testing 2 to 3 times daily.  Dispense: 100 each; Refill: 1    5. Hyperlipidemia LDL goal <70      He typically lives near [place].  He lives with his wife typically.  He has been in [place] to be near his granddaughter after recent health problems.  He was at [facility] recently to recover after being admitted in [place] for an intracranial hemorrhage.  He had been on Plavix and aspirin and was also reportedly thrombocytopenic.  He had an acute change in mental status and was sent to the ER at [facility] on 8/31. He was seen by neurosurgery.  They felt that the changes in his mental status are more related to metabolic disturbances caused by COVID-19 infection.  He was asymptomatic from a pulmonary standpoint.  He required no treatment.  No intervention was offered for his intracranial hemorrhage.    Remain off antiplatelet medications for now.  He is to follow with EDUARDO Braxton in the neurosurgery clinic on 9/19.  It is presumed that he was on dual antiplatelet therapy due to history of repeated ischemic stroke.   His granddaughter states that he had had at least 2 ischemic strokes within the last year.    Continue losartan.  Blood pressure at goal today. He has been off amlodipine recently.     He is on metformin.  He has a hemoglobin A1c of 8.0.  We will have a relaxed goal for him given his dementia and advanced age.  No change in therapy for his diabetes at present.  His granddaughter requested a new glucose monitor.  Prescription sent to [facility].  Instructed her that if his glucose is consistently greater than 170 or definitely greater than 200 they need to call the office for updated medication instructions.      He is on 20mg of atorvastatin, which he will continue.  His LDL cholesterol is less than 70 and at goal.    No plans to screen for prostate cancer or colorectal cancer moving forward given his dementia and advanced age.    He is going to receive ongoing home health services.  He and his wife are currently living with her granddaughter.  If he continues to recover well, then the plan will be for them to move back to [place] and reside at their home.  His granddaughter is also employing a caregiver to start tomorrow to assist them while she is at work.    Result Review :  Records reviewed from [facility] from late August, early September:  CT of the head as well as CT angiogram of the head neck/CT perfusion reviewed.  MRI of the brain without contrast was done and showed a left basal ganglia intraparenchymal hemorrhage measuring 2-3 cm.  Associated trace amount of intraventricular hemorrhage.  Extensive atrophy and small vessel disease.  Old ischemic changes in the right parietal lobe.    CMP from 9/1 showed a slight decrease in sodium at 133.  TSH normal at 2.920.  Hemoglobin A1c 8.0.  Lipid panel shows total cholesterol of 126 with an HDL of 47 and LDL of 66.  Triglycerides 59.  CBC from 9/1 was unremarkable with normal platelets.  Coagulation parameters were within normal limits.    COVID-19  testing was positive.    An After Visit Summary was printed and given to the patient at discharge.  Return in about 3 months (around 12/11/2023). Sooner if problems arise.       LIANE Turner DO  Electronically signed by ZENA Turner DO, 09/11/23, 2:43 PM CDT.

## 2023-09-12 ENCOUNTER — HOME CARE VISIT (OUTPATIENT)
Dept: HOME HEALTH SERVICES | Facility: CLINIC | Age: 78
End: 2023-09-12
Payer: MEDICARE

## 2023-09-12 VITALS
OXYGEN SATURATION: 99 % | SYSTOLIC BLOOD PRESSURE: 110 MMHG | TEMPERATURE: 97.8 F | DIASTOLIC BLOOD PRESSURE: 48 MMHG | RESPIRATION RATE: 16 BRPM | HEART RATE: 69 BPM

## 2023-09-12 VITALS
HEART RATE: 52 BPM | DIASTOLIC BLOOD PRESSURE: 74 MMHG | TEMPERATURE: 97.6 F | RESPIRATION RATE: 18 BRPM | OXYGEN SATURATION: 98 % | SYSTOLIC BLOOD PRESSURE: 144 MMHG

## 2023-09-12 PROCEDURE — G0300 HHS/HOSPICE OF LPN EA 15 MIN: HCPCS

## 2023-09-12 PROCEDURE — G0151 HHCP-SERV OF PT,EA 15 MIN: HCPCS

## 2023-09-13 ENCOUNTER — HOME CARE VISIT (OUTPATIENT)
Dept: HOME HEALTH SERVICES | Facility: HOME HEALTHCARE | Age: 78
End: 2023-09-13
Payer: MEDICARE

## 2023-09-13 ENCOUNTER — HOME CARE VISIT (OUTPATIENT)
Dept: HOME HEALTH SERVICES | Facility: CLINIC | Age: 78
End: 2023-09-13
Payer: MEDICARE

## 2023-09-13 VITALS
OXYGEN SATURATION: 98 % | TEMPERATURE: 97.9 F | SYSTOLIC BLOOD PRESSURE: 144 MMHG | HEART RATE: 88 BPM | DIASTOLIC BLOOD PRESSURE: 78 MMHG | RESPIRATION RATE: 16 BRPM

## 2023-09-13 PROCEDURE — G0152 HHCP-SERV OF OT,EA 15 MIN: HCPCS

## 2023-09-13 NOTE — CASE COMMUNICATION
Spoke with patient's granddaughter at length regarding in-home and out-of-home resources for support. At this time, she is not interested in SNF inpatient rehab. MSW will continue to assist over the phone with Medicaid entitlements and HCBW waiver application.

## 2023-09-13 NOTE — HOME HEALTH
Routine Visit Note:    SKill/Education Provided: Assessment, Education, Wound care    Patient/Caregiver Response: Ongoing    Falls: No    Med Changes: No    Physician Contact: No    Plan for Next Visit: Assessment, Education, wound care    Other/pertinent info: Pt had previous COVID infection at Hospital recently

## 2023-09-14 NOTE — HOME HEALTH
OCCUPATIONAL THERAPY EVALUATION    REASON FOR REFERRAL-  Patient referred due to recent decline in ADL's and strength.    PRIMARY DIAGNOSIS-  Covid 19   SECONDARY DIAGNOSIS-  DM, hemiplegia and hemiparesis  SURGICAL PROCEDURES-   none recently    PREVIOUS OCCUPATIONAL THERAPY- no  OXYGEN USE-no    CLINICAL FINDINGS:  WOUND / SKIN CONDITION- no issues  EDEMA-  none        EQUIPMENT walker with front wheels, wheelchair, shower chair,                          DRIVING- no longer driving          FUNCTIONAL ENDURANCE FOR SAFE ACTIVITIES OF DAILY LIVING / INSTRUMENTAL ACTIVITIES OF DAILY LIVING: fair        WEIGHT BEARING STATUS/PRECAUTIONS-  WBAT   ASSISTIVE DEVICE-  walker with front wheels      ACTIVITIES OF DAILY LIVING MOBILITY/FALLS RISK ASSESSMENT- at risk for falls due to weakness, impaired gait and balance, dependence on AD      MEDICAL NECESSITY- Skilled OT medically necessary to address UB strength and ADL's.    PATIENT GOAL FOR THIS EPISODE OF CARE- caregivers hope patient can assist with toileting and get up and down easier.    TODAY'S INTERVENTIONS-   Initial eval completed. Goals and POC discussed with patient and family and ADL training initiated. Patient very quiet but cooperative with requests. Slight difficulty following cues.     REHAB POTENTIAL-   good for stated goals    DATE OF NEXT APPOINTMENT WITH DOCTOR- 9/18/23   PATIENT / CAREGIVER AGREE WITH DISCHARGE PLAN- yes  COMMUNICATION / CARE COORDINATION- staff re: OT eval.

## 2023-09-15 ENCOUNTER — READMISSION MANAGEMENT (OUTPATIENT)
Dept: CALL CENTER | Facility: HOSPITAL | Age: 78
End: 2023-09-15
Payer: MEDICARE

## 2023-09-15 NOTE — OUTREACH NOTE
Medical Week 2 Survey      Flowsheet Row Responses   [facility] patient discharged from? [place]   Does the patient have one of the following disease processes/diagnoses(primary or secondary)? Other   Week 2 attempt successful? No   Unsuccessful attempts Attempt 1            Brisa Lynn Nurse

## 2023-09-18 ENCOUNTER — HOME CARE VISIT (OUTPATIENT)
Dept: HOME HEALTH SERVICES | Facility: CLINIC | Age: 78
End: 2023-09-18
Payer: MEDICARE

## 2023-09-18 PROCEDURE — G0299 HHS/HOSPICE OF RN EA 15 MIN: HCPCS

## 2023-09-19 ENCOUNTER — OFFICE VISIT (OUTPATIENT)
Dept: NEUROSURGERY | Facility: CLINIC | Age: 78
End: 2023-09-19
Payer: MEDICARE

## 2023-09-19 ENCOUNTER — READMISSION MANAGEMENT (OUTPATIENT)
Dept: CALL CENTER | Facility: HOSPITAL | Age: 78
End: 2023-09-19
Payer: MEDICARE

## 2023-09-19 ENCOUNTER — HOME CARE VISIT (OUTPATIENT)
Dept: HOME HEALTH SERVICES | Facility: CLINIC | Age: 78
End: 2023-09-19
Payer: MEDICARE

## 2023-09-19 VITALS
RESPIRATION RATE: 16 BRPM | DIASTOLIC BLOOD PRESSURE: 70 MMHG | HEART RATE: 68 BPM | SYSTOLIC BLOOD PRESSURE: 132 MMHG | OXYGEN SATURATION: 98 % | TEMPERATURE: 97 F

## 2023-09-19 VITALS
SYSTOLIC BLOOD PRESSURE: 112 MMHG | OXYGEN SATURATION: 97 % | TEMPERATURE: 95.9 F | HEART RATE: 64 BPM | RESPIRATION RATE: 18 BRPM | DIASTOLIC BLOOD PRESSURE: 72 MMHG

## 2023-09-19 VITALS — WEIGHT: 165 LBS | BODY MASS INDEX: 24.44 KG/M2 | HEIGHT: 69 IN

## 2023-09-19 DIAGNOSIS — I61.9 LEFT-SIDED NONTRAUMATIC INTRACEREBRAL HEMORRHAGE, UNSPECIFIED CEREBRAL LOCATION: Primary | ICD-10-CM

## 2023-09-19 DIAGNOSIS — Z78.9 NONSMOKER: ICD-10-CM

## 2023-09-19 PROBLEM — I61.5 INTRAVENTRICULAR HEMORRHAGE: Status: ACTIVE | Noted: 2023-09-19

## 2023-09-19 PROCEDURE — 99213 OFFICE O/P EST LOW 20 MIN: CPT | Performed by: NURSE PRACTITIONER

## 2023-09-19 PROCEDURE — G0151 HHCP-SERV OF PT,EA 15 MIN: HCPCS

## 2023-09-19 PROCEDURE — 1159F MED LIST DOCD IN RCRD: CPT | Performed by: NURSE PRACTITIONER

## 2023-09-19 PROCEDURE — 1160F RVW MEDS BY RX/DR IN RCRD: CPT | Performed by: NURSE PRACTITIONER

## 2023-09-19 NOTE — HOME HEALTH
Routine Visit Note:Alertt and pleasant. Daughter reports pt is more ambulatory with assist of one and by using his walker. Pt did see MD and a glucometer has been ordered. Pt has a redened heel. Explained to daughter that insurance has authed the current amt of visits.Call to ANI Sharp at [facility]. Will attempt to get more visits for heel assessment and care.    Skill/education provided: Instructed family to lotion heels daily and to float heels by placing pillows under ankles while sitting of lying down. Shoes removed and gripper socks applied    Patient/caregiver response: Pleasant and cooperative    Plan for next visit: DC unless further visits can be obtained.    Other pertinent info: No s/s of covid19

## 2023-09-19 NOTE — HOME HEALTH
pt/family denie new issues and compliance HEP and activity as allows   MD this a.m and long day  per family     Reason for Hosp/Primary Dx : 79 yo male Primary Dx: COVID-19 . Pt / psp reports hx recent CVA and Covid dx with hospitalization and return to family home 9-5-23   referral orders : physical deconditioning, dementia   Focus of Care: deconditioning, gait, transfers, safety per dx   Current Functional status/mobility/DME: wc suggested   See DME   HB status/Living Arrangements: lives with family - ramp   Skin Integrity/wound status: na   Code Status: FULL CODE   Fall Risk: high risk   PmHx: hx CVA R alee -   PLOF : household amb - community prn with A   Infectious disease screen : Pt denies s/s or a exposure to anyone with s/s of Covid -19   Skills :Education in ther ex to faciltate functional strength for transfers and mobility - reduce fall risk   Education in transfers with hand and AD placement for safety   Education in AD use to promote reduced fall risk - safe functional gait   Education on proper hand/foot placement, transitional movements, and safety awareness to decrease risk of falls

## 2023-09-19 NOTE — PROGRESS NOTES
"    Chief complaint:   Chief Complaint   Patient presents with    intracerebral hemorrhage     Pt here for 2wk HS f/u. Since pt has been D/C from HS his symptoms have improved. Pt is in wheel chair today.        Subjective     HPI: This is a 78-year-old male gentleman who was in a nursing facility.  He was initially admitted at [place] for intracranial hemorrhage and was found to have a platelet count of 20 and had been on aspirin and Plavix.  The patient did get sent to a nursing facility and felt like the patient had become confused.  He does have some degree of underlying dementia and is on Namenda.  He was tested for COVID and was found to be positive.  Imaging of his head did show a resolving left basal ganglia hemorrhage.  He comes in today for follow-up.  He is accompanied by his granddaughter and he is at her house and getting home health care.  She states that he is doing much better than what he was doing at the hospital.  He does still have some confusion.  Overall his ambulation is improving.    Review of Systems   Musculoskeletal:  Positive for gait problem.   Neurological:  Positive for weakness.   Psychiatric/Behavioral:  Positive for confusion.        Objective      Vital Signs  Ht 175.3 cm (69\")   Wt 74.8 kg (165 lb)   BMI 24.37 kg/m²     Physical Exam  Constitutional:       Appearance: Normal appearance. He is well-developed.   HENT:      Head: Normocephalic.   Eyes:      General: Lids are normal.      Conjunctiva/sclera: Conjunctivae normal.      Pupils: Pupils are equal, round, and reactive to light.   Pulmonary:      Effort: Pulmonary effort is normal.      Breath sounds: Normal breath sounds.   Musculoskeletal:         General: Normal range of motion.      Cervical back: Normal range of motion.   Skin:     General: Skin is warm.   Neurological:      Mental Status: He is alert.      GCS: GCS eye subscore is 4. GCS verbal subscore is 5. GCS motor subscore is 6.      Cranial Nerves: No " cranial nerve deficit.      Sensory: No sensory deficit.      Motor: Weakness present.      Gait: Gait abnormal.      Deep Tendon Reflexes: Reflexes are normal and symmetric. Reflexes normal.      Comments: Assistance needed with walking   Psychiatric:         Speech: Speech normal.         Behavior: Behavior normal.         Thought Content: Thought content normal.       Neurologic Exam     Mental Status   Oriented to person.   Disoriented to place.   Disoriented to year.   Speech: speech is normal   Follows commands     Cranial Nerves     CN III, IV, VI   Pupils are equal, round, and reactive to light.    Imaging review: No new imaging        Assessment/Plan: The patient is making improvements from his intracranial hemorrhage.  I am going to have him go for CT scan of his head to be done in 3 weeks to reevaluate the hemorrhage and make sure that it has resolved.  If the imaging looks good we will need to see him on an as-needed basis.  He was told to call us if any further problems or concerns    Patient is a nonsmoker  The patient's Body mass index is 24.37 kg/m².. BMI is within normal parameters. No follow-up required.  Advance Care Planning   ACP discussion was held with the patient during this visit. Patient does not have an advance directive, information provided.   STEADI Fall Risk Assessment was completed, and patient is at HIGH risk for falls. Assessment completed on:9/19/2023     Diagnoses and all orders for this visit:    1. Left-sided nontraumatic intracerebral hemorrhage, unspecified cerebral location (Primary)  -     CT Head Without Contrast; Future    2. Body mass index (BMI) of 24.0-24.9 in adult    3. Nonsmoker        I discussed the patients findings and my recommendations with patient  Bob Andrews, APRN  09/19/23  13:30 CDT

## 2023-09-19 NOTE — OUTREACH NOTE
Medical Week 2 Survey      Flowsheet Row Responses   [facility] patient discharged from? [facility]   Does the patient have one of the following disease processes/diagnoses(primary or secondary)? Other   Week 2 attempt successful? Yes   Call start time 1408   Discharge diagnosis AMS   Call end time 1411   Person spoke with today (if not patient) and relationship grandchild   Meds reviewed with patient/caregiver? Yes   Is the patient having any side effects they believe may be caused by any medication additions or changes? No   Does the patient have all medications ordered at discharge? Yes   Is the patient taking all medications as directed (includes completed medication regime)? Yes   Comments regarding appointments pt is currently seeing neurosurgery   Does the patient have a primary care provider?  Yes   Does the patient have an appointment with their PCP within 7 days of discharge? Yes   Comments regarding PCP Johnny   Has the patient kept scheduled appointments due by today? Yes   What is the Home health agency?  [facility]   Has home health visited the patient within 72 hours of discharge? Yes   Home health comments HH is visiting   Psychosocial issues? No   Did the patient receive a copy of their discharge instructions? Yes   Nursing interventions Reviewed instructions with patient   What is the patient's perception of their health status since discharge? Improving   Is the patient/caregiver able to teach back the hierarchy of who to call/visit for symptoms/problems? PCP, Specialist, Home health nurse, Urgent Care, ED, 911 Yes   If the patient is a current smoker, are they able to teach back resources for cessation? Not a smoker   Additional teach back comments pt is waiting on glucose monitor   Week 2 Call Completed? Yes   Graduated Yes   Did the patient feel the follow up calls were helpful during their recovery period? Yes   Wrap up additional comments Grandchild states hunter is doing ok at  this time.  They are working to get monitor, PCP is assisting.  No needs at this time.   Call end time 1411            ANKUSH CORREIA - Registered Nurse

## 2023-09-19 NOTE — PATIENT INSTRUCTIONS
Advance Care Planning and Advance Directives     You make decisions on a daily basis - decisions about where you want to live, your career, your home, your life. Perhaps one of the most important decisions you face is your choice for future medical care. Take time to talk with your family and your healthcare team and start planning today.  Advance Care Planning is a process that can help you:  Understand possible future healthcare decisions in light of your own experiences  Reflect on those decision in light of your goals and values  Discuss your decisions with those closest to you and the healthcare professionals that care for you  Make a plan by creating a document that reflects your wishes    Surrogate Decision Maker  In the event of a medical emergency, which has left you unable to communicate or to make your own decisions, you would need someone to make decisions for you.  It is important to discuss your preferences for medical treatment with this person while you are in good health.     Qualities of a surrogate decision maker:  Willing to take on this role and responsibility  Knows what you want for future medical care  Willing to follow your wishes even if they don't agree with them  Able to make difficult medical decisions under stressful circumstances    Advance Directives  These are legal documents you can create that will guide your healthcare team and decision maker(s) when needed. These documents can be stored in the electronic medical record.    Living Will - a legal document to guide your care if you have a terminal condition or a serious illness and are unable to communicate. States vary by statute in document names/types, but most forms may include one or more of the following:        -  Directions regarding life-prolonging treatments        -  Directions regarding artificially provided nutrition/hydration        -  Choosing a healthcare decision maker        -  Direction regarding organ/tissue  donation    Durable Power of  for Healthcare - this document names an -in-fact to make medical decisions for you, but it may also allow this person to make personal and financial decisions for you. Please seek the advice of an  if you need this type of document.    **Advance Directives are not required and no one may discriminate against you if you do not sign one.    Medical Orders  Many states allow specific forms/orders signed by your physician to record your wishes for medical treatment in your current state of health. This form, signed in personal communication with your physician, addresses resuscitation and other medical interventions that you may or may not want.      For more information or to schedule a time with a [facility] Advance Care Planning Facilitator contact: [email or web address] or call [phone] and someone will contact you directly.

## 2023-09-21 ENCOUNTER — HOME CARE VISIT (OUTPATIENT)
Dept: HOME HEALTH SERVICES | Facility: CLINIC | Age: 78
End: 2023-09-21
Payer: MEDICARE

## 2023-09-21 VITALS
TEMPERATURE: 98.1 F | SYSTOLIC BLOOD PRESSURE: 148 MMHG | DIASTOLIC BLOOD PRESSURE: 64 MMHG | RESPIRATION RATE: 16 BRPM | HEART RATE: 59 BPM | OXYGEN SATURATION: 99 %

## 2023-09-21 PROCEDURE — G0162 HHC RN E&M PLAN SVS, 15 MIN: HCPCS

## 2023-09-21 NOTE — HOME HEALTH
FOCUS OF CARE/ SKILLED NEED: Weakness from Covid 19, right heel wound    TEACHING/INTERVENTIONS: Right heel wound measured and photo'd. Zinc paste applied w/ optifoam for protection. Family checking blood sugar daily, encouraged family to keep a log of sugars. Pt able to state name, but unable to get birthday correct. Pt orientation waxes and wanes.  Daughter present and main caregiver. Discusses s/s of hypoglycemia and hyperglycemia and actions to take if either occur. Daughter verbalizes understanding. She is knowledgeable about Diabetes. Educated daughter/patient on fall risk, wearing nonskid socks and using walker appropriately. Pt states he does not use walker, however there is one right in front of him.  SN will continue to see patient until heel is healed.    PROGRESS TOWARD GOALS: Ongoing    PHYSICIAN CONTACT: NO    INSURANCE CHANGES? No    FALLS SINCE LAST VISIT? No    MEDICATION CHANGES SINCE LAST VISIT? NO    PLANS FOR NEXT VISIT: Wound care to right heel, monitor, cardiopulmonary assessment.    PRE-SCREENED FOR COVID?  Yes, no s/s of Covid, no recent exposure

## 2023-09-22 ENCOUNTER — TELEPHONE (OUTPATIENT)
Dept: INTERNAL MEDICINE | Facility: CLINIC | Age: 78
End: 2023-09-22
Payer: MEDICARE

## 2023-09-22 ENCOUNTER — HOME CARE VISIT (OUTPATIENT)
Dept: HOME HEALTH SERVICES | Facility: CLINIC | Age: 78
End: 2023-09-22
Payer: MEDICARE

## 2023-09-22 VITALS
RESPIRATION RATE: 20 BRPM | HEART RATE: 68 BPM | SYSTOLIC BLOOD PRESSURE: 112 MMHG | TEMPERATURE: 97.4 F | DIASTOLIC BLOOD PRESSURE: 78 MMHG

## 2023-09-22 DIAGNOSIS — E11.9 TYPE 2 DIABETES MELLITUS WITHOUT COMPLICATION, WITHOUT LONG-TERM CURRENT USE OF INSULIN: ICD-10-CM

## 2023-09-22 PROCEDURE — G0157 HHC PT ASSISTANT EA 15: HCPCS

## 2023-09-22 RX ORDER — DIPHENHYDRAMINE HYDROCHLORIDE 25 MG/1
1 CAPSULE, LIQUID FILLED ORAL TAKE AS DIRECTED
Qty: 1 EACH | Refills: 0 | Status: SHIPPED | OUTPATIENT
Start: 2023-09-22

## 2023-09-22 NOTE — TELEPHONE ENCOUNTER
"    Caller: donny gifford    Relationship: Emergency Contact    Best call back number: [phone]     What medications are you currently taking:   Current Outpatient Medications on File Prior to Visit   Medication Sig Dispense Refill    atorvastatin (LIPITOR) 20 MG tablet Take 1 tablet by mouth Daily. Indications: Inherited Heterozygous Hypercholesterolemia      bisacodyl (DULCOLAX) 10 MG suppository Insert 1 suppository into the rectum Daily As Needed for Constipation. Indications: Constipation      Blood Glucose Monitoring Suppl (Blood Glucose Monitor System) w/Device kit Use 1 each Take As Directed. 1 each 0    docusate sodium (COLACE) 100 MG capsule Take 1 capsule by mouth 2 (Two) Times a Day. Indications: Constipation      glucose blood test strip Use as instructed. Testing 2 to 3 times daily. 100 each 1    Lancets 30G misc Testing 2 to 3 times daily. 100 each 1    losartan (COZAAR) 100 MG tablet Take 1 tablet by mouth Daily. Indications: High Blood Pressure Disorder      memantine (NAMENDA XR) 7 MG capsule sustained-release 24 hr extended release capsule Take 1 capsule by mouth Daily. Indications: Lewy Body Dementia      metFORMIN ER (GLUCOPHAGE-XR) 500 MG 24 hr tablet Take 2 tablets by mouth Daily With Breakfast for 30 days. 60 tablet 0     No current facility-administered medications on file prior to visit.        Which medication are you concerned about: GLUCOSE MONITOR     Who prescribed you this medication: DR. EDGAR    What are your concerns: PATIENTS GRANDDAUGHTER STATES THE PHARMACY WAS UNABLE TO GIVE THIS SCRIPT TO PATIENT DUE TO A \"MISSING DIAGNOSES CODE\"        "

## 2023-09-22 NOTE — HOME HEALTH
COVID SCREENING: no s/s  FOCUS OF CARE/SKILLED NEED: gait, ther ex  TEACHING/INTERVENTIONS: HEP  PROGRESS TOWARD GOALS: pt is progressing toward goals  PHYSICIAN CONTACT: n/a  INSURANCE CHANGES: no  MEDICATION CHANGES SINCE LAST HH VISIT? no  PLAN FOR NEXT VISIT: anna ther ex    Pt has no new c/o today.

## 2023-09-26 ENCOUNTER — HOME CARE VISIT (OUTPATIENT)
Dept: HOME HEALTH SERVICES | Facility: CLINIC | Age: 78
End: 2023-09-26
Payer: MEDICARE

## 2023-09-26 PROCEDURE — G0300 HHS/HOSPICE OF LPN EA 15 MIN: HCPCS

## 2023-09-26 PROCEDURE — G0151 HHCP-SERV OF PT,EA 15 MIN: HCPCS

## 2023-09-27 VITALS
TEMPERATURE: 97 F | HEART RATE: 94 BPM | SYSTOLIC BLOOD PRESSURE: 128 MMHG | DIASTOLIC BLOOD PRESSURE: 62 MMHG | RESPIRATION RATE: 18 BRPM | OXYGEN SATURATION: 99 %

## 2023-09-27 NOTE — HOME HEALTH
Routine Visit Note:  Pt in living room recliner with spouse and daughter present. Daughter requested info regarding services for bathing Pt and along those lines. Pt informed OT could come in and help teach Pt and caregiver on bathing Pt safely with home supplies, Daughter stated she was wanting someone to do it due to it being difficult for her. Reached out to clinical manager and provided 2 agencies in [facility] that provide those services. Pt's daughter also inquired about services that pay for pads and incontinent care. Informed per clinical manager that insurance typically only covers durable medical equipment. Pt had new area that nurse found last visit per family, wound care provided.    SKill/Education Provided: Assessmnet, education, wound care    Patient/Caregiver Response: Ongoing    Falls: No    Med Changes: No    Physician Contact: No    Plan for Next Visit: Education, assessment, wound care, supplies    Other/Pertinent info: No s/s of COVID voiced or noted.

## 2023-09-28 ENCOUNTER — HOME CARE VISIT (OUTPATIENT)
Dept: HOME HEALTH SERVICES | Facility: CLINIC | Age: 78
End: 2023-09-28
Payer: MEDICARE

## 2023-09-28 PROCEDURE — G0299 HHS/HOSPICE OF RN EA 15 MIN: HCPCS

## 2023-09-29 ENCOUNTER — HOME CARE VISIT (OUTPATIENT)
Dept: HOME HEALTH SERVICES | Facility: CLINIC | Age: 78
End: 2023-09-29
Payer: MEDICARE

## 2023-09-29 VITALS
SYSTOLIC BLOOD PRESSURE: 112 MMHG | DIASTOLIC BLOOD PRESSURE: 70 MMHG | TEMPERATURE: 98 F | OXYGEN SATURATION: 98 % | HEART RATE: 76 BPM

## 2023-09-29 PROCEDURE — G0151 HHCP-SERV OF PT,EA 15 MIN: HCPCS

## 2023-09-29 NOTE — HOME HEALTH
Routine Visit Note:Alert. Slow to respond but does respond appropriatly. Sitting on couch watching TV. Wife present    Skill/education provided: wd assessment and care    Patient/caregiver response: cooperative    Plan for next visit: wd assessment and care    Other pertinent info: No s/s of covid 19

## 2023-09-30 VITALS
TEMPERATURE: 96.6 F | SYSTOLIC BLOOD PRESSURE: 118 MMHG | HEART RATE: 94 BPM | OXYGEN SATURATION: 95 % | DIASTOLIC BLOOD PRESSURE: 58 MMHG | RESPIRATION RATE: 18 BRPM

## 2023-10-02 ENCOUNTER — HOME CARE VISIT (OUTPATIENT)
Dept: HOME HEALTH SERVICES | Facility: CLINIC | Age: 78
End: 2023-10-02
Payer: MEDICARE

## 2023-10-02 VITALS
RESPIRATION RATE: 18 BRPM | TEMPERATURE: 98 F | SYSTOLIC BLOOD PRESSURE: 102 MMHG | HEART RATE: 85 BPM | OXYGEN SATURATION: 99 % | DIASTOLIC BLOOD PRESSURE: 60 MMHG

## 2023-10-02 PROCEDURE — G0157 HHC PT ASSISTANT EA 15: HCPCS

## 2023-10-02 NOTE — HOME HEALTH
Covid 19 pre-screen performed.Pt states no falls or changes to insurance since last visit. New med added to med list. Next visit to address gt training, transfer training, bed mobility, and progressive HEP.

## 2023-10-03 ENCOUNTER — HOME CARE VISIT (OUTPATIENT)
Dept: HOME HEALTH SERVICES | Facility: CLINIC | Age: 78
End: 2023-10-03
Payer: MEDICARE

## 2023-10-03 VITALS
DIASTOLIC BLOOD PRESSURE: 62 MMHG | OXYGEN SATURATION: 99 % | TEMPERATURE: 97.7 F | HEART RATE: 72 BPM | RESPIRATION RATE: 16 BRPM | SYSTOLIC BLOOD PRESSURE: 140 MMHG

## 2023-10-03 PROCEDURE — G0300 HHS/HOSPICE OF LPN EA 15 MIN: HCPCS

## 2023-10-03 NOTE — HOME HEALTH
Routine Visit Note:  Pt is quiet, but follows commands and answers when questions when asked directly. Spouse and caregiver present, expressed concerns regarding old area on buttocks reopened. Dressed per previous orders.  notified.    SKill/Education Provided: Assessment, education, wound care, measurements and pictures taken    Patient/Caregiver Response: Ongoing    Falls: No    Med Changes: No    Physician Contact: No    Plan for Next Visit: assessment, education, wound care    Other/Pertinent info: No s/s of COVID voiced or noted.

## 2023-10-04 ENCOUNTER — HOME CARE VISIT (OUTPATIENT)
Dept: HOME HEALTH SERVICES | Facility: CLINIC | Age: 78
End: 2023-10-04
Payer: MEDICARE

## 2023-10-04 VITALS — DIASTOLIC BLOOD PRESSURE: 80 MMHG | RESPIRATION RATE: 16 BRPM | SYSTOLIC BLOOD PRESSURE: 110 MMHG

## 2023-10-04 PROCEDURE — G0157 HHC PT ASSISTANT EA 15: HCPCS

## 2023-10-04 NOTE — HOME HEALTH
Covid 19 pre-screen performed. Pt states no falls or changes to insurance or medicaiton since last visit. Pt up amb w/RW upon arrival. Cgs state pt is doing better today.Next visit to address gt training, transfer training, bed mobiilty, & ed on HEP.

## 2023-10-05 ENCOUNTER — HOME CARE VISIT (OUTPATIENT)
Dept: HOME HEALTH SERVICES | Facility: CLINIC | Age: 78
End: 2023-10-05
Payer: MEDICARE

## 2023-10-05 VITALS
DIASTOLIC BLOOD PRESSURE: 70 MMHG | OXYGEN SATURATION: 99 % | TEMPERATURE: 96.9 F | SYSTOLIC BLOOD PRESSURE: 142 MMHG | HEART RATE: 86 BPM | RESPIRATION RATE: 16 BRPM

## 2023-10-05 PROCEDURE — G0300 HHS/HOSPICE OF LPN EA 15 MIN: HCPCS

## 2023-10-06 NOTE — HOME HEALTH
Routine Visit Note:  Pt alert to person and place. Caregiver/wife answers most questions for Pt. Swelling noted in Right leg/foot with 1+ pitting. Family voiced understadning with elevating, states Pt only tolerates elevating feet for 10 minutes at a time.    SKill/Education Provided: Assessment, Education, wound care    Patient/Caregiver Response: Ongoing    Falls: No     Med Changes: No    Physician Contact: No    Plan for Next Visit: Education, assessment, wound care    Other: No s/s of COVID voiced or noted.

## 2023-10-10 ENCOUNTER — HOME CARE VISIT (OUTPATIENT)
Dept: HOME HEALTH SERVICES | Facility: CLINIC | Age: 78
End: 2023-10-10
Payer: MEDICARE

## 2023-10-10 PROCEDURE — G0157 HHC PT ASSISTANT EA 15: HCPCS

## 2023-10-10 PROCEDURE — G0300 HHS/HOSPICE OF LPN EA 15 MIN: HCPCS

## 2023-10-10 NOTE — HOME HEALTH
Covid 19 pre-screen performed.Pt states no falls or changes to insurance or medicaiton since last visit. Pt has 1+ R foot edema. Nurse just saw pt for woundcare and checked vitals. PT to reassess next visit for cont'd therapy to improve transfer training, gt training, & ed on HEP.

## 2023-10-11 VITALS
DIASTOLIC BLOOD PRESSURE: 72 MMHG | SYSTOLIC BLOOD PRESSURE: 142 MMHG | TEMPERATURE: 97.3 F | HEART RATE: 90 BPM | RESPIRATION RATE: 16 BRPM | OXYGEN SATURATION: 99 %

## 2023-10-11 NOTE — HOME HEALTH
Routine Visit Note:    SKill/Education Provided: assessment, fall prevention, education, wound care, wound care supplies provided    Patient/Caregiver Response: Ongoing    Falls: No    Med Changes: No    Physician Contact: No    Plan for Next Visit: Education, fall prevention, wound care    Other: No s/s of Covid voiced or noted.

## 2023-10-13 ENCOUNTER — HOME CARE VISIT (OUTPATIENT)
Dept: HOME HEALTH SERVICES | Facility: CLINIC | Age: 78
End: 2023-10-13
Payer: MEDICARE

## 2023-10-13 VITALS
HEART RATE: 102 BPM | OXYGEN SATURATION: 99 % | TEMPERATURE: 96.6 F | RESPIRATION RATE: 16 BRPM | SYSTOLIC BLOOD PRESSURE: 122 MMHG | DIASTOLIC BLOOD PRESSURE: 60 MMHG

## 2023-10-13 PROCEDURE — G0159 HHC PT MAINT EA 15 MIN: HCPCS

## 2023-10-13 NOTE — HOME HEALTH
pre-screened covid 19    left heel posterior blister on non-weight bearing surface.  Covered with meplix, no drainage or bleeding at this time.  Patient's next MD appointment is 10/20/2023.

## 2023-10-16 ENCOUNTER — HOME CARE VISIT (OUTPATIENT)
Dept: HOME HEALTH SERVICES | Facility: CLINIC | Age: 78
End: 2023-10-16
Payer: MEDICARE

## 2023-10-16 VITALS
TEMPERATURE: 98.1 F | SYSTOLIC BLOOD PRESSURE: 138 MMHG | DIASTOLIC BLOOD PRESSURE: 82 MMHG | OXYGEN SATURATION: 99 % | RESPIRATION RATE: 16 BRPM | HEART RATE: 59 BPM

## 2023-10-16 PROCEDURE — G0157 HHC PT ASSISTANT EA 15: HCPCS

## 2023-10-16 NOTE — HOME HEALTH
Covid 19 pre-screen performed. Pt states no changes to insurance or medicaiton since last visit. Next visit to address gt training, transfer training, and progressive HEP.

## 2023-10-17 ENCOUNTER — TELEPHONE (OUTPATIENT)
Dept: INTERNAL MEDICINE | Facility: CLINIC | Age: 78
End: 2023-10-17

## 2023-10-17 ENCOUNTER — HOME CARE VISIT (OUTPATIENT)
Dept: HOME HEALTH SERVICES | Facility: CLINIC | Age: 78
End: 2023-10-17
Payer: MEDICARE

## 2023-10-17 VITALS
RESPIRATION RATE: 18 BRPM | HEART RATE: 78 BPM | DIASTOLIC BLOOD PRESSURE: 70 MMHG | TEMPERATURE: 98 F | SYSTOLIC BLOOD PRESSURE: 144 MMHG | OXYGEN SATURATION: 98 %

## 2023-10-17 PROCEDURE — G0299 HHS/HOSPICE OF RN EA 15 MIN: HCPCS

## 2023-10-17 NOTE — TELEPHONE ENCOUNTER
Caller: KIMO LAWRENCE LPN    Relationship to patient: Home Health    Best call back number: [phone]    Patient is needing: A REFERRAL TO WOUND CARE FOR CONSULT. THERE WILL BE PICTURES OF IT IN HIS CHART    HE HAS  UNSTAGABLE ULCER ON RIGHT FOOT. RIGHT FOOT IS BLACK. STAGE 2 ULCER ON THE LEFT   WORSE SINCE THE LAST TIME HE WAS SEEN.

## 2023-10-18 ENCOUNTER — HOME CARE VISIT (OUTPATIENT)
Dept: HOME HEALTH SERVICES | Facility: CLINIC | Age: 78
End: 2023-10-18
Payer: MEDICARE

## 2023-10-18 VITALS
DIASTOLIC BLOOD PRESSURE: 78 MMHG | OXYGEN SATURATION: 99 % | SYSTOLIC BLOOD PRESSURE: 110 MMHG | HEART RATE: 95 BPM | RESPIRATION RATE: 16 BRPM | TEMPERATURE: 98.1 F

## 2023-10-18 PROCEDURE — G0157 HHC PT ASSISTANT EA 15: HCPCS

## 2023-10-18 NOTE — HOME HEALTH
Routine Visit Note:Sitting on the couch with family. Slightly disoriented    Skill/education provided: Wd care and assessment. Right heel has an unstageable ulcer, left has a stage 2 ulcer. Family to take pt to MD to be eval for wound care eval. Family agreeable    Patient/caregiver response: cooperative    Plan for next visit: wd care assessment and treatment    Other pertinent info: No s/s of covid 19

## 2023-10-18 NOTE — HOME HEALTH
Covid 19 pre-screen performed. Pt states no falls or changes to insurance or medicaiton since last visit. Next visit to address gt training, transfer training, and ed on HEP.

## 2023-10-20 ENCOUNTER — HOSPITAL ENCOUNTER (OUTPATIENT)
Dept: CT IMAGING | Facility: HOSPITAL | Age: 78
Discharge: HOME | End: 2023-10-20
Admitting: NURSE PRACTITIONER
Payer: MEDICARE

## 2023-10-20 DIAGNOSIS — I61.9 LEFT-SIDED NONTRAUMATIC INTRACEREBRAL HEMORRHAGE, UNSPECIFIED CEREBRAL LOCATION: ICD-10-CM

## 2023-10-20 PROCEDURE — 70450 CT HEAD/BRAIN W/O DYE: CPT

## 2023-10-24 ENCOUNTER — TELEPHONE (OUTPATIENT)
Dept: NEUROSURGERY | Facility: CLINIC | Age: 78
End: 2023-10-24
Payer: MEDICARE

## 2023-10-24 ENCOUNTER — TELEPHONE (OUTPATIENT)
Dept: INTERNAL MEDICINE | Facility: CLINIC | Age: 78
End: 2023-10-24

## 2023-10-24 ENCOUNTER — HOME CARE VISIT (OUTPATIENT)
Dept: HOME HEALTH SERVICES | Facility: CLINIC | Age: 78
End: 2023-10-24
Payer: MEDICARE

## 2023-10-24 VITALS
RESPIRATION RATE: 18 BRPM | SYSTOLIC BLOOD PRESSURE: 132 MMHG | TEMPERATURE: 97.2 F | OXYGEN SATURATION: 96 % | DIASTOLIC BLOOD PRESSURE: 62 MMHG | HEART RATE: 62 BPM

## 2023-10-24 PROCEDURE — G0300 HHS/HOSPICE OF LPN EA 15 MIN: HCPCS

## 2023-10-24 NOTE — HOME HEALTH
Routine Visit Note:  Pt's family reports they were told they were refferreed to wound care and was supposed to get a call regarding appt. last week but still hasn't. This nurse messaged office nurse, gave Family 's office number to call due to reports of unable to reach family/caregiver. Chance called while nurse was present, set up appointment and per Granddaughter office stated they were sending referral to insurance for approval.     SKill/Education Provided: Assessment, wound care, education- fall prevention, wound care, safety regarding using wheeled walker when ambulating d/t Pt forgetting     Patient/Caregiver Response: Ongoing    Falls: No    Med Changes: No-Pt may be starting on aspirin soon per family.    Physician Contact: Yes, see above note.    Plan for Next Visit: Education, assessment, wound care, follow-up regarding wound care referral    Other: No s/s of COVID voiced or noted.

## 2023-10-24 NOTE — TELEPHONE ENCOUNTER
Placed a call to inform pts daughter that the CT scan of his head looks good and the bleeding has resolved.  No need for further follow-up. N/A LVM

## 2023-10-24 NOTE — TELEPHONE ENCOUNTER
Pt granddaughter called back was given information of imaging results and pt doesn't need a f/u apt.    Pt granddaughter wanted to know if pt can now take baby ASA now? She as informed pt will have a 6mo recovery process, does pt need to f/u with PCP going forward? Pt wakes up around 3am every morning and pt granddaughter wanted to know if there is anything besides melatonin to give pt she was instructed by home health she to give pt 20mg of melatonin and it has not helped pt slp. Informed pts granddaughter Bbo I not in office right now and I will send him a message. Ended call with pt daughter understanding and acknowledgment.

## 2023-10-24 NOTE — TELEPHONE ENCOUNTER
Placed a call to inform pts granddaughter per Bob pt can now take ASA and will need to f/u/ with family doctor for sleep. Ended call with pt granddaughter understanding and acknowledgment.

## 2023-10-24 NOTE — TELEPHONE ENCOUNTER
Caller: donny gifford    Relationship: Emergency Contact    Best call back number: [phone]  OK TO LEAVE VOICE MAIL    What form or medical record are you requesting: REFERRAL AND PRESCRIPTION TO GO TO WOUND CARE FOR HIS RIGHT HEEL     Who is requesting this form or medical record from you: PATIENT'S GRANDDAUGHTER    PLEASE CALL DONNY PATIENT'S GRANDDAUGHTER ABOUT APPOINTMENT

## 2023-10-24 NOTE — TELEPHONE ENCOUNTER
Spoke with granddaughter advised we would need to see pt  We had previously tried to reach out to patient and had to leave a message never able to make contact   Will see patient Friday

## 2023-10-25 ENCOUNTER — HOME CARE VISIT (OUTPATIENT)
Dept: HOME HEALTH SERVICES | Facility: CLINIC | Age: 78
End: 2023-10-25
Payer: MEDICARE

## 2023-10-25 VITALS
OXYGEN SATURATION: 97 % | DIASTOLIC BLOOD PRESSURE: 60 MMHG | RESPIRATION RATE: 16 BRPM | SYSTOLIC BLOOD PRESSURE: 130 MMHG | HEART RATE: 65 BPM | TEMPERATURE: 97.3 F

## 2023-10-25 PROCEDURE — G0151 HHCP-SERV OF PT,EA 15 MIN: HCPCS

## 2023-10-27 ENCOUNTER — OFFICE VISIT (OUTPATIENT)
Dept: INTERNAL MEDICINE | Facility: CLINIC | Age: 78
End: 2023-10-27
Payer: MEDICARE

## 2023-10-27 ENCOUNTER — HOME CARE VISIT (OUTPATIENT)
Dept: HOME HEALTH SERVICES | Facility: CLINIC | Age: 78
End: 2023-10-27
Payer: MEDICARE

## 2023-10-27 VITALS — SYSTOLIC BLOOD PRESSURE: 100 MMHG | RESPIRATION RATE: 16 BRPM | TEMPERATURE: 97.7 F | DIASTOLIC BLOOD PRESSURE: 60 MMHG

## 2023-10-27 VITALS
WEIGHT: 158 LBS | HEIGHT: 69 IN | SYSTOLIC BLOOD PRESSURE: 128 MMHG | TEMPERATURE: 97.5 F | BODY MASS INDEX: 23.4 KG/M2 | DIASTOLIC BLOOD PRESSURE: 78 MMHG

## 2023-10-27 DIAGNOSIS — E11.9 TYPE 2 DIABETES MELLITUS WITHOUT COMPLICATION, WITHOUT LONG-TERM CURRENT USE OF INSULIN: ICD-10-CM

## 2023-10-27 DIAGNOSIS — F02.A0 MILD LATE ONSET ALZHEIMER'S DEMENTIA WITHOUT BEHAVIORAL DISTURBANCE, PSYCHOTIC DISTURBANCE, MOOD DISTURBANCE, OR ANXIETY: ICD-10-CM

## 2023-10-27 DIAGNOSIS — E78.5 HYPERLIPIDEMIA LDL GOAL <70: ICD-10-CM

## 2023-10-27 DIAGNOSIS — Z86.79 HISTORY OF INTRACRANIAL HEMORRHAGE: ICD-10-CM

## 2023-10-27 DIAGNOSIS — G30.1 MILD LATE ONSET ALZHEIMER'S DEMENTIA WITHOUT BEHAVIORAL DISTURBANCE, PSYCHOTIC DISTURBANCE, MOOD DISTURBANCE, OR ANXIETY: ICD-10-CM

## 2023-10-27 DIAGNOSIS — I10 BENIGN ESSENTIAL HTN: ICD-10-CM

## 2023-10-27 DIAGNOSIS — L89.612 PRESSURE INJURY OF RIGHT HEEL, STAGE 2: Primary | ICD-10-CM

## 2023-10-27 PROCEDURE — G0300 HHS/HOSPICE OF LPN EA 15 MIN: HCPCS

## 2023-10-27 PROCEDURE — G0151 HHCP-SERV OF PT,EA 15 MIN: HCPCS

## 2023-10-27 RX ORDER — CEFDINIR 300 MG/1
300 CAPSULE ORAL 2 TIMES DAILY
Qty: 20 CAPSULE | Refills: 0 | Status: SHIPPED | OUTPATIENT
Start: 2023-10-27

## 2023-10-27 RX ORDER — MEMANTINE HYDROCHLORIDE 7 MG/1
7 CAPSULE, EXTENDED RELEASE ORAL DAILY
Qty: 30 CAPSULE | Refills: 3 | Status: SHIPPED | OUTPATIENT
Start: 2023-10-27

## 2023-10-27 RX ORDER — QUETIAPINE FUMARATE 25 MG/1
12.5 TABLET, FILM COATED ORAL NIGHTLY
Qty: 15 TABLET | Refills: 1 | Status: SHIPPED | OUTPATIENT
Start: 2023-10-27

## 2023-10-27 RX ORDER — DOXYCYCLINE HYCLATE 100 MG/1
100 CAPSULE ORAL 2 TIMES DAILY
Qty: 20 CAPSULE | Refills: 0 | Status: SHIPPED | OUTPATIENT
Start: 2023-10-27

## 2023-10-27 NOTE — PROGRESS NOTES
"    Chief Complaint  Wound Check (Pt has wound on L and R heel./R heel wound is painful and swelling occurs/Seeking referral to wound care. )    Subjective        Bert Pereira presents to [facility]  Wound Check    See below.     He is accompanied by his granddaughter.  She provides all the history.  He is limited with his dementia.    Objective   Vital Signs:  /78 (BP Location: Left arm, Patient Position: Sitting, Cuff Size: Adult)   Temp 97.5 °F (36.4 °C) (Infrared)   Ht 175.3 cm (69\")   Wt 71.7 kg (158 lb)   BMI 23.33 kg/m²   Estimated body mass index is 23.33 kg/m² as calculated from the following:    Height as of this encounter: 175.3 cm (69\").    Weight as of this encounter: 71.7 kg (158 lb).       BMI is within normal parameters. No other follow-up for BMI required.    Physical Exam  Constitutional:       Comments: Seen and discussed with his granddaughter.   HENT:      Head: Normocephalic and atraumatic.   Eyes:      Conjunctiva/sclera: Conjunctivae normal.      Pupils: Pupils are equal, round, and reactive to light.   Pulmonary:      Effort: Pulmonary effort is normal. No respiratory distress.   Musculoskeletal:         General: No swelling.   Skin:     General: Skin is warm and dry.      Findings: No rash.      Comments: See photos of his heels. The right has a soft area with drainage underneath the scabbed area. It drains clear serous fluid. No purulence or blood. No foul smell.    Neurological:      General: No focal deficit present.      Mental Status: He is alert. Mental status is at baseline. He is disoriented.      Motor: Weakness present.   Psychiatric:      Comments: Extremely flat affect.  Answers most questions with one-word answers.  Looks to his granddaughter to answer most questions for him.                 Result Review :  Followed with neurosurgery on 9/19.  Note from EDUARDO Braxton reviewed.    No showed an appointment with neurology on " 10/25.    Repeat CT scan of the head without contrast done by neurosurgery on 10/20 showed atrophy with small vessel disease.  Evidence of previous infarct involving the right anterior and posterior division of the MCA territory and right basal ganglia.  At the site of the previous intra-axial hemorrhage within the left basal ganglia there is encephalomalacia with associated volume loss.  Previously demonstrated intraventricular hemorrhage layering dependently within the posterior horn of both lateral ventricles has resolved.  No evidence of recurrent hemorrhage.         Assessment and Plan   Diagnoses and all orders for this visit:    1. Pressure injury of right heel, stage 2 (Primary)  -     cefdinir (OMNICEF) 300 MG capsule; Take 1 capsule by mouth 2 (Two) Times a Day.  Dispense: 20 capsule; Refill: 0  -     doxycycline (VIBRAMYCIN) 100 MG capsule; Take 1 capsule by mouth 2 (Two) Times a Day.  Dispense: 20 capsule; Refill: 0  -     Ambulatory Referral to Wound Clinic    2. Type 2 diabetes mellitus without complication, without long-term current use of insulin    3. Mild late onset Alzheimer's dementia without behavioral disturbance, psychotic disturbance, mood disturbance, or anxiety  -     QUEtiapine (SEROquel) 25 MG tablet; Take 0.5 tablets by mouth Every Night.  Dispense: 15 tablet; Refill: 1  -     memantine (NAMENDA XR) 7 MG capsule sustained-release 24 hr extended release capsule; Take 1 capsule by mouth Daily. Indications: Lewy Body Dementia  Dispense: 30 capsule; Refill: 3    4. History of intracranial hemorrhage    5. Benign essential HTN    6. Hyperlipidemia LDL goal <70    I first met the patient on 9/11/2023.    We first learned of the worsening heel breakdown problem on Tuesday, 10/17/2023.  His granddaughter had contacted the office and had asked for a wound care referral.  I had passed along on the same date that I would like to see him in the office first.  That was relayed to the patient's  granddaughter.  On 10/24, the appointment was ultimately made for today.    He had problems with pressure ulcers of the bilateral heels during his recent hospitalizations, but by the time that he admitted to [facility] these areas wound was healed.  He has started to have distinct worsening of the right heel.  This has an ulcer as above and photos.  The inferior portion of the wound is soft and drains serous fluid.  Would be concern for some degree of infection even though there is no javad purulence or foul odor.  Would place him on oral cefdinir and doxycycline for 10 days.  Placing a referral to wound care.  When they make recommendations, these may be able to be carried out by home health who continues to follow him for PT/OT.  Do not feel that either lesion seems to be deep enough to warrant imaging today.    He was having difficulty with swallowing metformin previously.  Dr. Gimenez was contacted on one of my days off and switch the patient to glipizide.  His granddaughter states that he has not experienced any hypoglycemia with glipizide and has rarely had a blood sugar greater than 140.  His hemoglobin A1c was 8.0 on 8/31.  Would plan to repeat this on his next visit and also check urine microalbumin.    His granddaughter gives a problem of insomnia that is not relieved by melatonin.  He has not slept well all week with the exception of the last 2 nights.  Sometimes has some agitation during the day.  Will start Seroquel 12.5 mg nightly.    Recently had a CT that showed resolution of his ICH.    Blood pressure at goal on losartan.    I inquired about influenza vaccination and he adamantly spoke up that he did not want 1.    Return in 1 month.  Can be seen sooner if he continues to have problems or has worsening problems.  We will make his return appointment his Medicare wellness visit for the year.       Follow Up   Return in about 4 weeks (around 11/24/2023) for Medicare Wellness.  Patient was given  instructions and counseling regarding his condition or for health maintenance advice. Please see specific information pulled into the AVS if appropriate.

## 2023-10-30 ENCOUNTER — HOME CARE VISIT (OUTPATIENT)
Dept: HOME HEALTH SERVICES | Facility: CLINIC | Age: 78
End: 2023-10-30
Payer: MEDICARE

## 2023-10-30 VITALS
OXYGEN SATURATION: 99 % | SYSTOLIC BLOOD PRESSURE: 118 MMHG | HEART RATE: 57 BPM | RESPIRATION RATE: 16 BRPM | TEMPERATURE: 96.3 F | DIASTOLIC BLOOD PRESSURE: 70 MMHG

## 2023-10-30 PROCEDURE — G0151 HHCP-SERV OF PT,EA 15 MIN: HCPCS

## 2023-10-30 NOTE — HOME HEALTH
Routine Visit Note:  Pt pleasant and cooperative with visit, Caregiver who is his grandaughter is present. States they went to MD office at 1330 and were referred to wound care. Pt was also prescribed an oral ATB by MD but did not picked up yet, is not sure what the name of it is.    SKill/Education Provided: Assessment, education-fall prevention-turning and repositioning-offloading heels, wound care    Patient/Caregiver Response: Ongoing     Falls: No    Med Changes: No    Physician Contact: No    Plan for Next Visit: Education, assessment of wounds, wound care, follow up if Pt scheduled a wound care appt.    Other: No s/s of COVID voiced or noted.

## 2023-11-01 ENCOUNTER — TRANSCRIBE ORDERS (OUTPATIENT)
Dept: ADMINISTRATIVE | Facility: HOSPITAL | Age: 78
End: 2023-11-01
Payer: MEDICARE

## 2023-11-01 ENCOUNTER — HOSPITAL ENCOUNTER (OUTPATIENT)
Dept: GENERAL RADIOLOGY | Facility: HOSPITAL | Age: 78
Discharge: HOME | End: 2023-11-01
Admitting: NURSE PRACTITIONER
Payer: MEDICARE

## 2023-11-01 ENCOUNTER — HOME CARE VISIT (OUTPATIENT)
Dept: HOME HEALTH SERVICES | Facility: CLINIC | Age: 78
End: 2023-11-01
Payer: MEDICARE

## 2023-11-01 ENCOUNTER — OFFICE VISIT (OUTPATIENT)
Dept: WOUND CARE | Facility: HOSPITAL | Age: 78
End: 2023-11-01
Payer: MEDICARE

## 2023-11-01 VITALS
RESPIRATION RATE: 16 BRPM | OXYGEN SATURATION: 98 % | DIASTOLIC BLOOD PRESSURE: 80 MMHG | HEART RATE: 55 BPM | TEMPERATURE: 96.8 F | SYSTOLIC BLOOD PRESSURE: 130 MMHG

## 2023-11-01 DIAGNOSIS — M86.9 INFLAMMATION OF BONE: Primary | ICD-10-CM

## 2023-11-01 DIAGNOSIS — M86.9 INFLAMMATION OF BONE: ICD-10-CM

## 2023-11-01 DIAGNOSIS — I73.9 PERIPHERAL VASCULAR DISEASE, UNSPECIFIED: ICD-10-CM

## 2023-11-01 PROCEDURE — 73630 X-RAY EXAM OF FOOT: CPT

## 2023-11-01 PROCEDURE — G0151 HHCP-SERV OF PT,EA 15 MIN: HCPCS

## 2023-11-01 PROCEDURE — G0463 HOSPITAL OUTPT CLINIC VISIT: HCPCS

## 2023-11-01 NOTE — HOME HEALTH
pre-screened covid 19    Caregiver reports patient has some right foot pain yesterday.  Patient has wound care appointment tomorrow for bilateral feet.  Caregiver request patient to continue with physical therapy.  Patient is making improvements in ambulation distance, endurance, and strength with transfers.  Patient continues to require assistance with transfers and he is high fall risk.  Patient to continue with physical therapy to progress to supervision transfers, reduce risk of injury to caregivers with transfers, and reduce fall risk.

## 2023-11-02 ENCOUNTER — HOME CARE VISIT (OUTPATIENT)
Dept: HOME HEALTH SERVICES | Facility: CLINIC | Age: 78
End: 2023-11-02
Payer: MEDICARE

## 2023-11-02 VITALS
DIASTOLIC BLOOD PRESSURE: 72 MMHG | HEART RATE: 63 BPM | SYSTOLIC BLOOD PRESSURE: 138 MMHG | RESPIRATION RATE: 16 BRPM | TEMPERATURE: 97.9 F | OXYGEN SATURATION: 97 %

## 2023-11-02 PROCEDURE — G0299 HHS/HOSPICE OF RN EA 15 MIN: HCPCS

## 2023-11-03 NOTE — CASE COMMUNICATION
60 Day Summary    Home Health need continues for: Wound care to bilateral heels    Primary diagnoses/co-morbidities/recent procedures in past 60 days that impact current episode: Pt originally admitted for covid, recent stroke, developed 2 unstageable pressure injuries to bilat heels.    Current level of functional ability: Ambulate with assist of 1 with rollator    Homebound status and living arrangements: Homebound, lives with tan r and granddaughter who are caretakers for pt and wife    Goals accomplished and/or measurable progress toward unmet goals in past 60 days: Covid has resolved. Wounds still present, started wound care on 11/1/23    Skilled need:  Wound care    Focus of care for next 60 days for each discipline ordered: Wound care    Skin integrity/wound status: Right heel unstageable pressure injury, left heel unspecified pressure injury    Code status:  full code    Most recent fall risk: High fall risk    Estimated date when home care services will end When wounds are healed    Plan of Care confirmed with EDUARDO Fonseca on 11/2/23

## 2023-11-03 NOTE — HOME HEALTH
60 Day Summary    Home Health need continues for: Wound care to bilateral heels    Primary diagnoses/co-morbidities/recent procedures in past 60 days that impact current episode: Pt originally admitted for covid, recent stroke, developed 2 unstageable pressure injuries to bilat heels.    Current level of functional ability: Ambulate with assist of 1 with rollator    Homebound status and living arrangements: Homebound, lives with daughter and granddaughter who are caretakers for pt and wife    Goals accomplished and/or measurable progress toward unmet goals in past 60 days: Covid has resolved. Wounds still present, started wound care on 11/1/23    Skilled need:  Wound care    Focus of care for next 60 days for each discipline ordered: Wound care    Skin integrity/wound status: Right heel unstageable pressure injury, left heel unspecified pressure injury    Code status: full code    Most recent fall risk: High fall risk    Estimated date when home care services will end When wounds are healed    Plan of Care confirmed with EDUARDO Fonseca on 11/2/23

## 2023-11-06 ENCOUNTER — HOME CARE VISIT (OUTPATIENT)
Dept: HOME HEALTH SERVICES | Facility: CLINIC | Age: 78
End: 2023-11-06
Payer: MEDICARE

## 2023-11-06 VITALS
OXYGEN SATURATION: 98 % | DIASTOLIC BLOOD PRESSURE: 62 MMHG | HEART RATE: 60 BPM | SYSTOLIC BLOOD PRESSURE: 124 MMHG | TEMPERATURE: 97.3 F | RESPIRATION RATE: 16 BRPM

## 2023-11-06 PROCEDURE — G0300 HHS/HOSPICE OF LPN EA 15 MIN: HCPCS

## 2023-11-06 NOTE — HOME HEALTH
Routine Visit Note:  Pt pleasant and cooperative with visit sitting in couch recliner chair, Pt's spouse and duaghter are present. All questions asked and answered.    SKill/Education Provided: Assessment, education, wound care, wound pictures and measurements taken, supplies provided    Patient/Caregiver Response: Ongoing    Falls: No    Med Changes: No     Physician Contact: No    Plan for Next Visit: Education, assessment, wound care, follow-up circulation test     Other: No s/s of COVID voiced or noted.

## 2023-11-07 ENCOUNTER — HOME CARE VISIT (OUTPATIENT)
Dept: HOME HEALTH SERVICES | Facility: CLINIC | Age: 78
End: 2023-11-07
Payer: MEDICARE

## 2023-11-07 VITALS
HEART RATE: 53 BPM | TEMPERATURE: 96.8 F | RESPIRATION RATE: 16 BRPM | OXYGEN SATURATION: 97 % | SYSTOLIC BLOOD PRESSURE: 100 MMHG | DIASTOLIC BLOOD PRESSURE: 60 MMHG

## 2023-11-07 PROCEDURE — G0151 HHCP-SERV OF PT,EA 15 MIN: HCPCS

## 2023-11-08 ENCOUNTER — HOME CARE VISIT (OUTPATIENT)
Dept: HOME HEALTH SERVICES | Facility: CLINIC | Age: 78
End: 2023-11-08
Payer: MEDICARE

## 2023-11-08 ENCOUNTER — HOSPITAL ENCOUNTER (OUTPATIENT)
Dept: ULTRASOUND IMAGING | Facility: HOSPITAL | Age: 78
Discharge: HOME | End: 2023-11-08
Admitting: NURSE PRACTITIONER
Payer: MEDICARE

## 2023-11-08 VITALS
DIASTOLIC BLOOD PRESSURE: 64 MMHG | OXYGEN SATURATION: 94 % | RESPIRATION RATE: 18 BRPM | TEMPERATURE: 98.5 F | HEART RATE: 65 BPM | SYSTOLIC BLOOD PRESSURE: 100 MMHG

## 2023-11-08 PROCEDURE — G0299 HHS/HOSPICE OF RN EA 15 MIN: HCPCS

## 2023-11-08 PROCEDURE — 93923 UPR/LXTR ART STDY 3+ LVLS: CPT

## 2023-11-08 NOTE — HOME HEALTH
FOCUS OF CARE/SKILLED NEED:  Diabetic skin ulcers    TEACHING/INTERVENTIONS: Wound care performed per MD orders with patient tolerating with some pain with right pain, 4/10. Pain reduced once wound care finished.  Patient alert to person and able to answer yes or no questions but per family he is agreeable with all things and denies pain even when he has pain. Patient to see wound care on Friday.  Instructed on importance of skin integrety, pressure reducing methods.  Caregiver reports using pillow at night between feet to help reduce breakdown.  Patient encouraged to leave dressings to bilateral feet in place but caregivers reporting that patient forgets and takes off dressings frequently.     PROGRESS TOWARD GOALS: ongoing    PHYSICIAN CONTACT: regarding wound status, no new orders    INSURANCE CHANGES? none    MEDICATION CHANGES SINCE LAST VISIT? none    PLANS FOR NEXT VISIT: wound care    PRE-SCREEN FOR COVID? YES, NO S/S OF COVID, NO RECENT EXPOSURES

## 2023-11-09 ENCOUNTER — HOME CARE VISIT (OUTPATIENT)
Dept: HOME HEALTH SERVICES | Facility: CLINIC | Age: 78
End: 2023-11-09
Payer: MEDICARE

## 2023-11-09 VITALS
HEART RATE: 50 BPM | TEMPERATURE: 96.9 F | RESPIRATION RATE: 16 BRPM | DIASTOLIC BLOOD PRESSURE: 60 MMHG | SYSTOLIC BLOOD PRESSURE: 100 MMHG | OXYGEN SATURATION: 92 %

## 2023-11-09 PROCEDURE — G0151 HHCP-SERV OF PT,EA 15 MIN: HCPCS

## 2023-11-10 ENCOUNTER — OFFICE VISIT (OUTPATIENT)
Dept: WOUND CARE | Facility: HOSPITAL | Age: 78
End: 2023-11-10
Payer: MEDICARE

## 2023-11-13 ENCOUNTER — HOME CARE VISIT (OUTPATIENT)
Dept: HOME HEALTH SERVICES | Facility: CLINIC | Age: 78
End: 2023-11-13
Payer: MEDICARE

## 2023-11-13 VITALS
OXYGEN SATURATION: 96 % | TEMPERATURE: 97.5 F | RESPIRATION RATE: 16 BRPM | DIASTOLIC BLOOD PRESSURE: 66 MMHG | SYSTOLIC BLOOD PRESSURE: 120 MMHG | HEART RATE: 70 BPM

## 2023-11-13 PROCEDURE — G0300 HHS/HOSPICE OF LPN EA 15 MIN: HCPCS

## 2023-11-13 PROCEDURE — G0157 HHC PT ASSISTANT EA 15: HCPCS

## 2023-11-13 NOTE — HOME HEALTH
COVID SCREENING: no s/s  FOCUS OF CARE/SKILLED NEED: chantelle castillo ex  TEACHING/INTERVENTIONS: HEP  PROGRESS TOWARD GOALS: pt is progressing toward goals  PHYSICIAN CONTACT: n/a  INSURANCE CHANGES: no  MEDICATION CHANGES SINCE LAST HH VISIT? no  PLAN FOR NEXT VISIT: chantelle castillo ex    Pt's CG reports that pt fell a few days ago (reported to SN) and that he also does not sleep.

## 2023-11-14 VITALS
SYSTOLIC BLOOD PRESSURE: 120 MMHG | RESPIRATION RATE: 16 BRPM | OXYGEN SATURATION: 96 % | HEART RATE: 70 BPM | TEMPERATURE: 97.5 F | DIASTOLIC BLOOD PRESSURE: 66 MMHG

## 2023-11-14 NOTE — HOME HEALTH
Routine Visit Note:  Family stated Pt had a witnessed fall when bending down to  something. Pt pleasant and cooperative with visit. Needed supplies left for Pt.     SKill/Education Provided: Assessment, education, wound care, supplies provided    Patient/Caregiver Response: Ongoing    Falls: Yes    Med Changes: No    Physician Contact: No    Plan for Next Visit: Assessment, education, wound care    Other: No s/s of COVID voiced or noted.

## 2023-11-15 ENCOUNTER — HOME CARE VISIT (OUTPATIENT)
Dept: HOME HEALTH SERVICES | Facility: CLINIC | Age: 78
End: 2023-11-15
Payer: MEDICARE

## 2023-11-15 VITALS
RESPIRATION RATE: 18 BRPM | DIASTOLIC BLOOD PRESSURE: 82 MMHG | SYSTOLIC BLOOD PRESSURE: 132 MMHG | HEART RATE: 78 BPM | TEMPERATURE: 98 F | OXYGEN SATURATION: 97 %

## 2023-11-15 PROCEDURE — G0299 HHS/HOSPICE OF RN EA 15 MIN: HCPCS

## 2023-11-15 NOTE — HOME HEALTH
FOCUS OF CARE/SKILLED NEED: diabetes with diabetic ulcer    TEACHING/INTERVENTIONS: Pt caregiver report no falls, wound care provided per md orders with wound showing signs and symptoms of healing with smaller wound measurements, no signs or symptoms of infection noted.  Pt with cough at visit with wheezing heard in right lower lobe but cleared with cough.  SP02 97%, no signs or symptom of increased SOA.  PROGRESS TOWARD GOALS: ongoing    PHYSICIAN CONTACT: none    INSURANCE CHANGES? none    MEDICATION CHANGES SINCE LAST VISIT? none    PLANS FOR NEXT VISIT: wound care    PRE-SCREEN FOR COVID? YES, NO S/S OF COVID, NO RECENT EXPOSURES none

## 2023-11-16 ENCOUNTER — HOME CARE VISIT (OUTPATIENT)
Dept: HOME HEALTH SERVICES | Facility: CLINIC | Age: 78
End: 2023-11-16
Payer: MEDICARE

## 2023-11-16 VITALS
RESPIRATION RATE: 18 BRPM | SYSTOLIC BLOOD PRESSURE: 100 MMHG | OXYGEN SATURATION: 97 % | TEMPERATURE: 97.7 F | HEART RATE: 84 BPM | DIASTOLIC BLOOD PRESSURE: 70 MMHG

## 2023-11-16 PROCEDURE — G0157 HHC PT ASSISTANT EA 15: HCPCS

## 2023-11-16 NOTE — HOME HEALTH
No signs or sx of covid  No new falls  No new meds  R Foot has mild edema     Plan:  continue strengthening and gait

## 2023-11-17 ENCOUNTER — OFFICE VISIT (OUTPATIENT)
Dept: WOUND CARE | Facility: HOSPITAL | Age: 78
End: 2023-11-17
Payer: MEDICARE

## 2023-11-21 ENCOUNTER — HOME CARE VISIT (OUTPATIENT)
Dept: HOME HEALTH SERVICES | Facility: CLINIC | Age: 78
End: 2023-11-21
Payer: MEDICARE

## 2023-11-21 VITALS
TEMPERATURE: 98 F | OXYGEN SATURATION: 97 % | SYSTOLIC BLOOD PRESSURE: 126 MMHG | DIASTOLIC BLOOD PRESSURE: 70 MMHG | HEART RATE: 71 BPM | RESPIRATION RATE: 20 BRPM

## 2023-11-21 PROCEDURE — G0299 HHS/HOSPICE OF RN EA 15 MIN: HCPCS

## 2023-11-21 NOTE — HOME HEALTH
Routine Visit Note:Sitting on couch with wife and daughter in the room. Responds to questions only if prompted to do so. Denies new concerns.     Skill/education provided: Wd assessment and care    Patient/caregiver response: Cooperative    Plan for next visit: wd assessment and care    Other pertinent info: No s/s of covid 19

## 2023-11-22 ENCOUNTER — HOME CARE VISIT (OUTPATIENT)
Dept: HOME HEALTH SERVICES | Facility: CLINIC | Age: 78
End: 2023-11-22
Payer: MEDICARE

## 2023-11-22 VITALS
RESPIRATION RATE: 16 BRPM | DIASTOLIC BLOOD PRESSURE: 80 MMHG | HEART RATE: 72 BPM | OXYGEN SATURATION: 99 % | SYSTOLIC BLOOD PRESSURE: 142 MMHG | TEMPERATURE: 98.1 F

## 2023-11-22 VITALS — TEMPERATURE: 96.4 F | SYSTOLIC BLOOD PRESSURE: 128 MMHG | RESPIRATION RATE: 16 BRPM | DIASTOLIC BLOOD PRESSURE: 60 MMHG

## 2023-11-22 PROCEDURE — G0300 HHS/HOSPICE OF LPN EA 15 MIN: HCPCS

## 2023-11-22 PROCEDURE — G0151 HHCP-SERV OF PT,EA 15 MIN: HCPCS

## 2023-11-23 NOTE — HOME HEALTH
Routine Visit Note:   Pt is dressed and ready to go with family out of town for thanksgiving. Pt seems in good spirits and speaks to nurse even when not being asked questions. Family voices Patient has been ambulating wihtout walker and has to be cued to remember to walk with it. Pt observed standing and walking, appear off-balance and unsteady when not using walker and Patient was educated.     SKill/Education Provided: Assessment, education, wound care    Patient/Caregiver Response: Ongoing    Falls: No    Med Changes: No    Physician Contact: No    Plan for Next Visit: Assessment, education, wound care    Other: No s/s of COVID voiced or noted.

## 2023-11-24 ENCOUNTER — HOME CARE VISIT (OUTPATIENT)
Dept: HOME HEALTH SERVICES | Facility: CLINIC | Age: 78
End: 2023-11-24
Payer: MEDICARE

## 2023-11-24 NOTE — CASE COMMUNICATION
Patient missed a physical therapy visit from [facility] on 11/24/2023.     Reason: Patient declined physical therapy visit due to holiday.       For your records only.   As per home health protocol, MD must be notified of missed/cancelled visits; therefore the prescribed frequency was not met.

## 2023-11-27 ENCOUNTER — OFFICE VISIT (OUTPATIENT)
Dept: WOUND CARE | Facility: HOSPITAL | Age: 78
End: 2023-11-27
Payer: MEDICARE

## 2023-11-29 ENCOUNTER — HOME CARE VISIT (OUTPATIENT)
Dept: HOME HEALTH SERVICES | Facility: CLINIC | Age: 78
End: 2023-11-29
Payer: MEDICARE

## 2023-11-29 VITALS
TEMPERATURE: 97.5 F | HEART RATE: 88 BPM | DIASTOLIC BLOOD PRESSURE: 80 MMHG | OXYGEN SATURATION: 94 % | SYSTOLIC BLOOD PRESSURE: 130 MMHG | RESPIRATION RATE: 18 BRPM

## 2023-11-29 PROCEDURE — G0300 HHS/HOSPICE OF LPN EA 15 MIN: HCPCS

## 2023-11-29 PROCEDURE — G0151 HHCP-SERV OF PT,EA 15 MIN: HCPCS

## 2023-11-29 NOTE — HOME HEALTH
pre-screened covid 19    Caregiver reports patient is continuing to improve with physical therapy.  Caregiver reports patient is walking to the bathroom and getting up/down off the toilet with much less assistance due to his improvements with physical therapy.

## 2023-11-30 VITALS
TEMPERATURE: 97.4 F | HEART RATE: 62 BPM | OXYGEN SATURATION: 99 % | DIASTOLIC BLOOD PRESSURE: 80 MMHG | SYSTOLIC BLOOD PRESSURE: 140 MMHG | RESPIRATION RATE: 18 BRPM

## 2023-11-30 NOTE — HOME HEALTH
Routine Visit Note:      SKill/Education Provided: Assessment, education, wound care    Patient/Caregiver Response: Ongoing    Falls: No    Med Changes: No    Physician Contact: No    Plan for Next Visit: Assessment, education, wound care    Other: No s/s of COVID voiced or noted.

## 2023-12-01 ENCOUNTER — HOME CARE VISIT (OUTPATIENT)
Dept: HOME HEALTH SERVICES | Facility: CLINIC | Age: 78
End: 2023-12-01
Payer: MEDICARE

## 2023-12-01 VITALS
TEMPERATURE: 97.3 F | DIASTOLIC BLOOD PRESSURE: 86 MMHG | RESPIRATION RATE: 18 BRPM | HEART RATE: 91 BPM | SYSTOLIC BLOOD PRESSURE: 144 MMHG | OXYGEN SATURATION: 96 %

## 2023-12-01 PROCEDURE — G0300 HHS/HOSPICE OF LPN EA 15 MIN: HCPCS

## 2023-12-01 NOTE — HOME HEALTH
Routine Visit Note:      SKill/Education Provided: Assessment, education wound care    Patient/Caregiver Response: Ongoing    Falls: No    Med Changes: No     Physician Contact: No    Plan for Next Visit: Assessment, education, wound care    Other: No s/s of COVID voiced or noted. Enoxaparin/Lovenox

## 2023-12-01 NOTE — HOME HEALTH
SUBJECTIVE:    SIGNS AND SYMPTOMS OF COVID:    FALLS:    INSURANCE CHANGES:    MEDICATION CHANGES:    EDEMA:     PLAN:

## 2023-12-01 NOTE — CASE COMMUNICATION
Patient missed a PT visit from [facility] on 12/1/23     Reason: PT and nursing double booked for same time. Arrived after nursing visit, however patient was leaving to go to another appt.        For your records only.   As per home health protocol, MD must be notified of missed/cancelled visits; therefore the prescribed frequency was not met.

## 2023-12-04 ENCOUNTER — HOME CARE VISIT (OUTPATIENT)
Dept: HOME HEALTH SERVICES | Facility: CLINIC | Age: 78
End: 2023-12-04
Payer: MEDICARE

## 2023-12-04 VITALS
TEMPERATURE: 97.8 F | HEART RATE: 83 BPM | SYSTOLIC BLOOD PRESSURE: 110 MMHG | OXYGEN SATURATION: 97 % | RESPIRATION RATE: 14 BRPM | DIASTOLIC BLOOD PRESSURE: 70 MMHG

## 2023-12-04 PROCEDURE — G0300 HHS/HOSPICE OF LPN EA 15 MIN: HCPCS

## 2023-12-06 ENCOUNTER — HOME CARE VISIT (OUTPATIENT)
Dept: HOME HEALTH SERVICES | Facility: CLINIC | Age: 78
End: 2023-12-06
Payer: MEDICARE

## 2023-12-06 VITALS
DIASTOLIC BLOOD PRESSURE: 70 MMHG | TEMPERATURE: 97.4 F | OXYGEN SATURATION: 95 % | SYSTOLIC BLOOD PRESSURE: 126 MMHG | RESPIRATION RATE: 18 BRPM | HEART RATE: 65 BPM

## 2023-12-06 VITALS
RESPIRATION RATE: 18 BRPM | TEMPERATURE: 98.1 F | HEART RATE: 59 BPM | OXYGEN SATURATION: 98 % | SYSTOLIC BLOOD PRESSURE: 150 MMHG | DIASTOLIC BLOOD PRESSURE: 87 MMHG

## 2023-12-06 PROCEDURE — G0300 HHS/HOSPICE OF LPN EA 15 MIN: HCPCS

## 2023-12-06 PROCEDURE — G0157 HHC PT ASSISTANT EA 15: HCPCS

## 2023-12-06 NOTE — HOME HEALTH
SUBJECTIVE: Patient states he does not have any pain currently.     SIGNS AND SYMPTOMS OF COVID:    FALLS: no    INSURANCE CHANGES: no    MEDICATION CHANGES: no    EDEMA: none observed    PLAN: continue with focus on safety/fall prevention education, gait mechanics, and exercise technique and compliance.

## 2023-12-07 NOTE — HOME HEALTH
Routine Visit Note:    SKill/Education Provided: Assessment, education, wound care    Patient/Caregiver Response: Pt and Caregiver present, voiced understanding of plan of care.    Falls: No    Med Changes: No    Physician Contact: No    Plan for Next Visit: Assessment, education, wound care    Other: No s/s of COVID voiced or noted.

## 2023-12-08 ENCOUNTER — OFFICE VISIT (OUTPATIENT)
Dept: WOUND CARE | Facility: HOSPITAL | Age: 78
End: 2023-12-08
Payer: MEDICARE

## 2023-12-08 ENCOUNTER — HOME CARE VISIT (OUTPATIENT)
Dept: HOME HEALTH SERVICES | Facility: CLINIC | Age: 78
End: 2023-12-08
Payer: MEDICARE

## 2023-12-08 VITALS — SYSTOLIC BLOOD PRESSURE: 148 MMHG | TEMPERATURE: 98.5 F | RESPIRATION RATE: 18 BRPM | DIASTOLIC BLOOD PRESSURE: 80 MMHG

## 2023-12-08 PROCEDURE — G0157 HHC PT ASSISTANT EA 15: HCPCS

## 2023-12-08 NOTE — HOME HEALTH
SUBJECTIVE: Patient denies pain today. No other complaints upon arrival.    SIGNS AND SYMPTOMS OF COVID: no    FALLS: no    INSURANCE CHANGES: no    MEDICATION CHANGES: no    EDEMA: no    PLAN: continue with focus on exercises technique, transfers and gait mechanics.

## 2023-12-11 ENCOUNTER — HOME CARE VISIT (OUTPATIENT)
Dept: HOME HEALTH SERVICES | Facility: CLINIC | Age: 78
End: 2023-12-11
Payer: MEDICARE

## 2023-12-11 VITALS
TEMPERATURE: 98.1 F | OXYGEN SATURATION: 99 % | RESPIRATION RATE: 18 BRPM | SYSTOLIC BLOOD PRESSURE: 130 MMHG | HEART RATE: 60 BPM | DIASTOLIC BLOOD PRESSURE: 66 MMHG

## 2023-12-11 PROCEDURE — G0300 HHS/HOSPICE OF LPN EA 15 MIN: HCPCS

## 2023-12-12 NOTE — HOME HEALTH
Routine Visit Note:    SKill/Education Provided: Assessment, education, wound care, wound care supplies provided    Patient/Caregiver Response: Patient and Spouse present, voiced understanding regarding keeping dressing covering wound area.     Falls: No    Med Changes: No    Physician Contact: No    Plan for Next Visit: Assessment, education, wound care    Other:No s/s of COVID voiced or noted.

## 2023-12-13 ENCOUNTER — HOME CARE VISIT (OUTPATIENT)
Dept: HOME HEALTH SERVICES | Facility: CLINIC | Age: 78
End: 2023-12-13
Payer: MEDICARE

## 2023-12-13 VITALS
SYSTOLIC BLOOD PRESSURE: 126 MMHG | DIASTOLIC BLOOD PRESSURE: 80 MMHG | HEART RATE: 90 BPM | TEMPERATURE: 97.9 F | OXYGEN SATURATION: 99 % | RESPIRATION RATE: 20 BRPM

## 2023-12-13 PROCEDURE — G0157 HHC PT ASSISTANT EA 15: HCPCS

## 2023-12-13 NOTE — HOME HEALTH
SUBJECTIVE: Patient reports he does not have any pain except when he puts pressure on his right heel, where pressure sore is. Patient's family states patient walked in the yard with his grand-daughter last night with his walker for an hour. They state he does his exercises when he feels like it and will usually do them for his grand-daughter and AB but otherwise will not. They note he is up walking in home and getting up and down from chair with minimal difficulty. Family in agreement to discharge next visit.His wife says he has tests that will be run on Friday morning so he will need an afternoon appt.    SIGNS AND SYMPTOMS OF COVID: no    FALLS: no    INSURANCE CHANGES: no    MEDICATION CHANGES: no    EDEMA: none observed    PLAN: DC next visit

## 2023-12-13 NOTE — CASE COMMUNICATION
You have an appt. to see Mr. Pereira on Friday. He has two tests that will be run Friday morning at the hospital but they said he can be seen in the afternoon. He is ready to discharge.

## 2023-12-15 ENCOUNTER — OFFICE VISIT (OUTPATIENT)
Dept: WOUND CARE | Facility: HOSPITAL | Age: 78
End: 2023-12-15
Payer: MEDICARE

## 2023-12-15 ENCOUNTER — HOME CARE VISIT (OUTPATIENT)
Dept: HOME HEALTH SERVICES | Facility: CLINIC | Age: 78
End: 2023-12-15
Payer: MEDICARE

## 2023-12-15 NOTE — CASE COMMUNICATION
Patient missed a physical therapy visit from [facility] on 12/15/2023.     Reason: Patient had multiple MD appointments today and he was unavailable for physical therapy.       For your records only.   As per home health protocol, MD must be notified of missed/cancelled visits; therefore the prescribed frequency was not met.

## 2023-12-15 NOTE — CASE COMMUNICATION
Kamille Del Angel.   Please see note below and advise.   Thanks,      ----- Message -----  From: Rory Andres APRN  Sent: 12/15/2023   3:38 PM CST  To: Dick Wylie PT      Thanks for letting me know.      I need to know what to do for this fella.  He is still having the home health under my name but he didn't show to my appointment and has not rescheduled so he doesn't have an appointment.  I would like his care for home health to be  transferred to his PCP.  What is the best approach to this.    ----- Message -----  From: Dick Wylie PT  Sent: 12/15/2023   2:02 PM CST  To: EDUARDO Marin    Patient missed a physical therapy visit from [facility] on 12/15/2023.     Reason: Patient had multiple MD appointments today and he was unavailable for physical therapy.       For your records only.   As per home health protocol, MD must be notified of missed /cancelled visits; therefore the prescribed frequency was not met.

## 2023-12-18 ENCOUNTER — HOME CARE VISIT (OUTPATIENT)
Dept: HOME HEALTH SERVICES | Facility: CLINIC | Age: 78
End: 2023-12-18
Payer: MEDICARE

## 2023-12-18 VITALS
OXYGEN SATURATION: 90 % | RESPIRATION RATE: 20 BRPM | HEART RATE: 66 BPM | DIASTOLIC BLOOD PRESSURE: 80 MMHG | TEMPERATURE: 97.5 F | SYSTOLIC BLOOD PRESSURE: 148 MMHG

## 2023-12-18 PROCEDURE — G0299 HHS/HOSPICE OF RN EA 15 MIN: HCPCS

## 2023-12-18 NOTE — CASE COMMUNICATION
Thanks.  Did you inform Rory Andres APRN for me?   Chance     ----- Message -----  From: Rodney Hemphill RN  Sent: 12/18/2023  11:34 AM CST  To: EDUARDO Marin; Elissa Case PTA; *  Subject: FW: MICHAEL RODRIGUEZ, attending physician for [facility] home health has been changed to Dr. Turner.    ----- Message -----  From: ZENA Turner DO  Sent: 12/18/2023  11:18 AM CST  To: Rodney Hemphill RN  Subject: RE:                                              Yes. Thanks.     [facility]  ----- Message -----  From: Rodney Hemphill RN  Sent: 12/18/2023  10:31 AM CST  To: ZENA Turner DO; Maritza Lo RN; *  Subject: FW:                                              Rory CLARK has requested that Mr. Pereira's home health care be transferred to his PCP and you're who I have listed.  Are you willing to take  over as attending for this patient for home health?  He currently has orders for SN thru 1/4/24 and PT thru 12/23/23.    ----- Message -----  From: Dick Wylie PT  Sent: 12/15/2023   5:29 PM CST  To: Rodney Hemphill RN  Subject: FW:                                              Kamille Del Angel.   Please see note below and advise.   Thanks,      ----- Message -----  From: Rory Andres APRN  Sent: 12/15/2023   3:38 PM CST  To: Marly Wylie PT      Thanks for letting me know.      I need to know what to do for this fella.  He is still having the home health under my name but he didn't show to my appointment and has not rescheduled so he doesn't have an appointment.  I would like his care for home health to be transferred to his PCP.  What is the best approach to this.    ----- Message -----  From: Dick Wylie PT  Sent: 12/15/2023   2:02 PM CST  To: EDUARDO Washington    Patient missed a physical therapy visit from [facility] on 12/15/2023.     Reason: Patient had multiple MD appointments today and he was unavailable for  physical therapy.       For your records only.   As per home health protocol, MD must be notified of missed/cancelled visits; therefore the prescribed frequency was not met.

## 2023-12-18 NOTE — HOME HEALTH
Routine Visit Note:Sitring on couch. Dressing off of right heel    Skill/education provided: Wd assessment and care. Reinstructed to keep feet elevated with offloading to right heel.    Patient/caregiver response: Cooperative    Plan for next visit: wd assessment and care    Other pertinent info: No s/s of covid 19

## 2023-12-19 ENCOUNTER — HOME CARE VISIT (OUTPATIENT)
Dept: HOME HEALTH SERVICES | Facility: CLINIC | Age: 78
End: 2023-12-19
Payer: MEDICARE

## 2023-12-19 NOTE — CASE COMMUNICATION
Patient missed a physical therapy discharge visit from [facility] on 12/19/2023.     Reason: I have been unable to reach caregiver on the phone on 12/18/2023 and 12/19/2023 to schedule visit.       For your records only.   Per CMS Guidance, MD must be notified of missed/cancelled visits; therefore the prescribed frequency was not met.

## 2023-12-21 ENCOUNTER — OFFICE VISIT (OUTPATIENT)
Dept: WOUND CARE | Facility: HOSPITAL | Age: 78
End: 2023-12-21
Payer: MEDICARE

## 2023-12-27 ENCOUNTER — HOME CARE VISIT (OUTPATIENT)
Dept: HOME HEALTH SERVICES | Facility: CLINIC | Age: 78
End: 2023-12-27
Payer: MEDICARE

## 2023-12-28 ENCOUNTER — HOME CARE VISIT (OUTPATIENT)
Dept: HOME HEALTH SERVICES | Facility: CLINIC | Age: 78
End: 2023-12-28
Payer: MEDICARE

## 2023-12-28 NOTE — CASE COMMUNICATION
Patient missed a physical therapy visit from [facility] on 12/28/2023.     Reason: Caregiver cancelled visit due to patient sickness and he did not want to participate in physical therapy this week.       For your records only.   Per CMS Guidance, MD must be notified of missed/cancelled visits; therefore the prescribed frequency was not met.

## 2024-01-01 ENCOUNTER — TELEPHONE (OUTPATIENT)
Dept: CASE MANAGEMENT | Facility: OTHER | Age: 79
End: 2024-01-01
Payer: MEDICARE

## 2024-01-01 ENCOUNTER — TELEPHONE (OUTPATIENT)
Dept: INTERNAL MEDICINE | Facility: CLINIC | Age: 79
End: 2024-01-01

## 2024-01-01 ENCOUNTER — HOME HEALTH ADMISSION (OUTPATIENT)
Dept: HOME HEALTH SERVICES | Facility: HOME HEALTHCARE | Age: 79
End: 2024-01-01
Payer: MEDICARE

## 2024-01-01 ENCOUNTER — PATIENT OUTREACH (OUTPATIENT)
Dept: CASE MANAGEMENT | Facility: OTHER | Age: 79
End: 2024-01-01
Payer: MEDICARE

## 2024-01-01 DIAGNOSIS — Z86.79 HISTORY OF INTRACRANIAL HEMORRHAGE: Primary | ICD-10-CM

## 2024-01-03 ENCOUNTER — HOME CARE VISIT (OUTPATIENT)
Dept: HOME HEALTH SERVICES | Facility: CLINIC | Age: 79
End: 2024-01-03
Payer: MEDICARE

## 2024-01-03 VITALS
RESPIRATION RATE: 18 BRPM | OXYGEN SATURATION: 100 % | SYSTOLIC BLOOD PRESSURE: 150 MMHG | HEART RATE: 67 BPM | DIASTOLIC BLOOD PRESSURE: 92 MMHG | TEMPERATURE: 97.4 F

## 2024-01-03 PROCEDURE — G0299 HHS/HOSPICE OF RN EA 15 MIN: HCPCS

## 2024-01-03 NOTE — HOME HEALTH
60 Day Summary Pt seen for left and right heel ulcers initially, as well as med education for diabetes, and antibiotic therapy.    Home Health need continues for: Right heel decubitus ulcer    Primary diagnoses/co-morbidities/recent procedures in past 60 days that impact current episode: Type 2 DM, right heel decubitus ulcer    Current level of functional ability: Ambulate with rollator w/ assistance, requires cueing.    Homebound status and living arrangements: Pt homebound, leaves for medical appointments, lives with granddaughter, wife, and daughter    Goals accomplished and/or measurable progress toward unmet goals in past 60 days: Left heel ulcer healed.    Skilled need:  Wound care right heel for decubitus ulcer    Focus of care for next 60 days for each discipline ordered: Decubitus ulcer on right heel, stage 3    Skin integrity/wound status: Generalized ecchymosis, right heel decubitus ulcer    Code status: full code    Most recent fall risk: 9/10 on [facility] 10    Estimated date when home care services will end: When right calcaneous ulcer heals.    Plan of Care confirmed with Dr. Jimmie Turner on 1/3/24.

## 2024-01-03 NOTE — CASE COMMUNICATION
60 Day Summary Pt seen for left and right heel ulcers initially, as well as med education for diabetes, and antibiotic therapy.    Home Health need continues for: Right heel decubitus ulcer    Primary diagnoses/co-morbidities/recent procedures in past 60 days that impact current episode: Type 2 DM, right heel decubitus ulcer    Current level of functional ability: Ambulate with rollator w/ assistance, requires cueing.    Homebound statu s and living arrangements: Pt homebound, leaves for medical appointments, lives with granddaughter, wife, and daughter    Goals accomplished and/or measurable progress toward unmet goals in past 60 days: Left heel ulcer healed.    Skilled need:  Wound care right heel for decubitus ulcer    Focus of care for next 60 days for each discipline ordered: Decubitus ulcer on right heel, stage 3    Skin integrity/wound status: Generalized ecchym osis, right heel decubitus ulcer    Code status: full code    Most recent fall risk: 9/10 on [facility] 10    Estimated date when home care services will end: When right calcaneous ulcer heals.    Plan of Care confirmed with Dr. Jimmie Turner on 1/3/24.

## 2024-01-05 ENCOUNTER — HOME CARE VISIT (OUTPATIENT)
Dept: HOME HEALTH SERVICES | Facility: CLINIC | Age: 79
End: 2024-01-05
Payer: MEDICARE

## 2024-01-05 VITALS
TEMPERATURE: 97.2 F | RESPIRATION RATE: 16 BRPM | SYSTOLIC BLOOD PRESSURE: 130 MMHG | OXYGEN SATURATION: 99 % | HEART RATE: 66 BPM | DIASTOLIC BLOOD PRESSURE: 80 MMHG

## 2024-01-05 PROCEDURE — G0151 HHCP-SERV OF PT,EA 15 MIN: HCPCS

## 2024-01-05 NOTE — HOME HEALTH
pre-screened covid 19    Patient denies pain today.  Patient had a fall last week at home. Patient to continue with physical therapy due to recent fall.  Caregiver denies any injuries from his recent fall.  Per caregiver, patient attempted to walk without rollator and when he turned around he fell.  Caregiver reports patient sometimes forgets to use his rollator when he gets up from sitting.  Patient has follow up with vascular surgeon next week and he will find out if he is a candidate for LE vascular surgical procedure due to blockage. Patient has wound care appointment this afternoon.

## 2024-01-08 ENCOUNTER — HOME CARE VISIT (OUTPATIENT)
Dept: HOME HEALTH SERVICES | Facility: CLINIC | Age: 79
End: 2024-01-08
Payer: MEDICARE

## 2024-01-08 PROCEDURE — G0300 HHS/HOSPICE OF LPN EA 15 MIN: HCPCS

## 2024-01-09 VITALS
OXYGEN SATURATION: 96 % | DIASTOLIC BLOOD PRESSURE: 78 MMHG | RESPIRATION RATE: 18 BRPM | SYSTOLIC BLOOD PRESSURE: 130 MMHG | HEART RATE: 63 BPM | TEMPERATURE: 97.4 F

## 2024-01-09 NOTE — HOME HEALTH
Routine Visit Note:    Patient's Spouse reports states Chance is going out of town on a business trip so they will be without a Caregiver, they plan on staying out of town with Son-in-law due to Spouse being unable to care for him by herself.     SKill/Education Provided: Assessment, education-fall prevention/pressure injury prevention, wound care, photos and measurement. supplies provided    Patient/Caregiver Response: Patient's Spouse present, voiced understanding with education this visit.     Falls: No    Med Changes: No    Physician Contact: No    Plan for Next Visit: Assessment, education, wound care    Other: No s/s of COVID voiced or noted. Pstient reports Patient had a dry cough that has since imrpoved.

## 2024-01-10 ENCOUNTER — HOME CARE VISIT (OUTPATIENT)
Dept: HOME HEALTH SERVICES | Facility: CLINIC | Age: 79
End: 2024-01-10
Payer: MEDICARE

## 2024-01-10 ENCOUNTER — OFFICE VISIT (OUTPATIENT)
Dept: WOUND CARE | Facility: HOSPITAL | Age: 79
End: 2024-01-10
Payer: MEDICARE

## 2024-01-10 ENCOUNTER — TELEPHONE (OUTPATIENT)
Dept: INTERNAL MEDICINE | Facility: CLINIC | Age: 79
End: 2024-01-10

## 2024-01-10 NOTE — TELEPHONE ENCOUNTER
Caller: donny gifford  NO BH VERBAL. INFORMATION NOT RELEASED    Relationship: Emergency Contact    Best call back number: [phone]     What is the best time to reach you: AS SOON AS POSSIBLE    Who are you requesting to speak with (clinical staff, provider,  specific staff member): CLINICAL    What was the call regarding: DONNY IS STATING THAT HER GRANDFATHER DEANA HAD A CT SCAN DONE A COUPLE OF WEEKS BACK AT [facility] AND TODAY THEY WENT TO SEE THE PHYSICIAN TO DISCUSS RESULTS (DR. 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 DR. OWENS ADVISED DONNY THAT HE WOULD BE CALLING OVER TO DR. EDGAR TO SEE IF HE COULD GET DEANA IN THIS FRIDAY 01/12/24. JOHNATHON PAVON IS SCHEDULED FOR FRIDAY ALREADY AT 2:00 WITH DR. EDGAR. DONNY WAS CHECKING TO MAKE SURE DR. EDGAR WAS AWARE.    [email or web address]  [facility] [place] [address], [place] [address]  [phone]

## 2024-01-12 ENCOUNTER — HOME CARE VISIT (OUTPATIENT)
Dept: HOME HEALTH SERVICES | Facility: HOME HEALTHCARE | Age: 79
End: 2024-01-12
Payer: MEDICARE

## 2024-01-12 ENCOUNTER — HOME CARE VISIT (OUTPATIENT)
Dept: HOME HEALTH SERVICES | Facility: CLINIC | Age: 79
End: 2024-01-12
Payer: MEDICARE

## 2024-01-12 VITALS
HEART RATE: 75 BPM | DIASTOLIC BLOOD PRESSURE: 62 MMHG | TEMPERATURE: 98.1 F | SYSTOLIC BLOOD PRESSURE: 114 MMHG | OXYGEN SATURATION: 93 % | RESPIRATION RATE: 16 BRPM

## 2024-01-12 VITALS
SYSTOLIC BLOOD PRESSURE: 130 MMHG | HEART RATE: 72 BPM | TEMPERATURE: 98.3 F | DIASTOLIC BLOOD PRESSURE: 78 MMHG | RESPIRATION RATE: 16 BRPM | OXYGEN SATURATION: 95 %

## 2024-01-12 PROCEDURE — G0157 HHC PT ASSISTANT EA 15: HCPCS

## 2024-01-12 PROCEDURE — G0299 HHS/HOSPICE OF RN EA 15 MIN: HCPCS

## 2024-01-12 RX ORDER — TAMSULOSIN HYDROCHLORIDE 0.4 MG/1
1 CAPSULE ORAL NIGHTLY
Qty: 90 CAPSULE | Refills: 1 | Status: SHIPPED | OUTPATIENT
Start: 2024-01-12

## 2024-01-12 RX ORDER — GLIPIZIDE 5 MG/1
5 TABLET ORAL DAILY
Qty: 90 TABLET | Refills: 1 | Status: SHIPPED | OUTPATIENT
Start: 2024-01-12

## 2024-01-12 NOTE — CASE COMMUNICATION
Patient's wife states patient fell yesterday in the kitchen while trying to  a bottle of coke that was on the floor. Patient denies injury. Patient's wife states they called her grand-daughters  Bryce and he came over and assisted patient back up.

## 2024-01-12 NOTE — HOME HEALTH
SUBJECTIVE: Patient denies pain today and states he did not have any pain yesterday either. Patient's wife states patient fell yesterday in the kitchen while trying to  a bottle of coke that was on the floor. Patient denies injury. Patient's wife states they called her grand-daughters  Bryce and he came over and assisted patient back up. Patient's wife states her grand-daughter in going to be out of town starting next week for a few weeks and they will be unable to stay here while she is gone due to needing assistance to care for her . She states they will be going to [facility] to stay with her daughter and AB for a few weeks until she can come back to her grand-daughters's home. She requests to cancel all remaining HH visits until they return.     SIGNS AND SYMPTOMS OF COVID: no    FALLS: no    INSURANCE CHANGES: no    MEDICATION CHANGES: no    EDEMA: none observed    PLAN: Patient to hold services until he returns.

## 2024-01-12 NOTE — CASE COMMUNICATION
Patient missed a PT visit from [facility] on 1/10/24.     Reason: Spoke with patient's caregiver who states patient has 2 doctor's appointments today and declines visit. Patient scheduled for Friday to continue PT visits.      For your records only.   Per CMS Guidance, MD must be notified of missed/cancelled visits; therefore the prescribed frequency was not met.

## 2024-01-12 NOTE — HOME HEALTH
FOCUS OF CARE/ SKILLED NEED: Wound care    TEACHING/INTERVENTIONS: WOund cleaned and ag collagen applied to base, covered with 4x4 foam.  Patient tolerated well.  Pt had a fall yesterday in kitchen, bent down to get a pepsi and stopped using his walker and fell on his bottom.  Educated pt and cg on fall preventions, always using walker when up and wearing nonskid socks.  Pt verbalized understanding.  Pt denies any injury or pain.  Pt going out of town for respite care due to noone being able to stay with him here for a few weeks.  Supplies left for wound care.    PROGRESS TOWARD GOALS: Ongoing    PHYSICIAN CONTACT: No    INSURANCE CHANGES? No    FALLS SINCE LAST VISIT? Yes    MEDICATION CHANGES SINCE LAST VISIT? No    PLANS FOR NEXT VISIT: Pt/Cg will call when pt back in town to resume visits.    PRE-SCREENED FOR COVID?  Yes, no s/s of Covid, no recent exposure

## 2024-01-17 ENCOUNTER — HOME CARE VISIT (OUTPATIENT)
Dept: HOME HEALTH SERVICES | Facility: CLINIC | Age: 79
End: 2024-01-17
Payer: MEDICARE

## 2024-01-17 NOTE — CASE COMMUNICATION
Patient missed a PT visit from [facility] on 1/17/24.     Reason: Home care on hold due to patient being out of town staying with daughter at this time.      For your records only.   Per CMS Guidance, MD must be notified of missed/cancelled visits; therefore the prescribed frequency was not met.

## 2024-01-24 ENCOUNTER — HOME CARE VISIT (OUTPATIENT)
Dept: HOME HEALTH SERVICES | Facility: CLINIC | Age: 79
End: 2024-01-24
Payer: MEDICARE

## 2024-01-26 ENCOUNTER — HOME CARE VISIT (OUTPATIENT)
Dept: HOME HEALTH SERVICES | Facility: CLINIC | Age: 79
End: 2024-01-26
Payer: MEDICARE

## 2024-01-26 NOTE — CASE COMMUNICATION
Patient missed a PT visit from [facility] on 1/24/24.     Reason: Pt. on hold.       For your records only.   Per CMS Guidance, MD must be notified of missed/cancelled visits; therefore the prescribed frequency was not met.

## 2024-01-26 NOTE — HOME HEALTH
Patient discharged without a visit due to he has left our service area.  Patient is staying with family in [place].

## 2024-01-31 ENCOUNTER — HOME CARE VISIT (OUTPATIENT)
Dept: HOME HEALTH SERVICES | Facility: CLINIC | Age: 79
End: 2024-01-31
Payer: MEDICARE

## 2024-02-07 ENCOUNTER — HOME CARE VISIT (OUTPATIENT)
Dept: HOME HEALTH SERVICES | Facility: CLINIC | Age: 79
End: 2024-02-07
Payer: MEDICARE

## 2024-02-14 ENCOUNTER — HOME CARE VISIT (OUTPATIENT)
Dept: HOME HEALTH SERVICES | Facility: CLINIC | Age: 79
End: 2024-02-14
Payer: MEDICARE

## 2024-02-14 VITALS
SYSTOLIC BLOOD PRESSURE: 124 MMHG | OXYGEN SATURATION: 99 % | HEART RATE: 45 BPM | TEMPERATURE: 97.4 F | RESPIRATION RATE: 16 BRPM | DIASTOLIC BLOOD PRESSURE: 58 MMHG

## 2024-02-14 PROCEDURE — G0299 HHS/HOSPICE OF RN EA 15 MIN: HCPCS

## 2024-02-16 ENCOUNTER — OFFICE VISIT (OUTPATIENT)
Dept: INTERNAL MEDICINE | Facility: CLINIC | Age: 79
End: 2024-02-16
Payer: MEDICARE

## 2024-02-16 ENCOUNTER — OFFICE VISIT (OUTPATIENT)
Dept: WOUND CARE | Facility: HOSPITAL | Age: 79
End: 2024-02-16
Payer: MEDICARE

## 2024-02-16 VITALS
WEIGHT: 178 LBS | OXYGEN SATURATION: 98 % | HEIGHT: 69 IN | HEART RATE: 58 BPM | TEMPERATURE: 97.8 F | BODY MASS INDEX: 26.36 KG/M2 | SYSTOLIC BLOOD PRESSURE: 140 MMHG | DIASTOLIC BLOOD PRESSURE: 58 MMHG

## 2024-02-16 DIAGNOSIS — Z86.79 HISTORY OF INTRACRANIAL HEMORRHAGE: ICD-10-CM

## 2024-02-16 DIAGNOSIS — F02.A0 MILD LATE ONSET ALZHEIMER'S DEMENTIA WITHOUT BEHAVIORAL DISTURBANCE, PSYCHOTIC DISTURBANCE, MOOD DISTURBANCE, OR ANXIETY: ICD-10-CM

## 2024-02-16 DIAGNOSIS — G30.1 MILD LATE ONSET ALZHEIMER'S DEMENTIA WITHOUT BEHAVIORAL DISTURBANCE, PSYCHOTIC DISTURBANCE, MOOD DISTURBANCE, OR ANXIETY: ICD-10-CM

## 2024-02-16 DIAGNOSIS — I71.43 INFRARENAL ABDOMINAL AORTIC ANEURYSM (AAA) WITHOUT RUPTURE: Primary | ICD-10-CM

## 2024-02-16 DIAGNOSIS — E11.9 TYPE 2 DIABETES MELLITUS WITHOUT COMPLICATION, WITHOUT LONG-TERM CURRENT USE OF INSULIN: ICD-10-CM

## 2024-02-16 DIAGNOSIS — I10 BENIGN ESSENTIAL HTN: ICD-10-CM

## 2024-02-16 LAB — HBA1C MFR BLD: 8 % (ref 4.5–5.7)

## 2024-02-16 NOTE — PROGRESS NOTES
"    Chief Complaint  Discuss Aortic Aneurysm    Subjective        Bert Pereira presents to [facility] PRIMARY CARE  History of Present Illness  See below.     Objective   Vital Signs:  /58 (BP Location: Left arm, Patient Position: Sitting, Cuff Size: Adult)   Pulse 58   Temp 97.8 °F (36.6 °C) (Infrared)   Ht 175.3 cm (69\")   Wt 80.7 kg (178 lb)   SpO2 98%   BMI 26.29 kg/m²   Estimated body mass index is 26.29 kg/m² as calculated from the following:    Height as of this encounter: 175.3 cm (69\").    Weight as of this encounter: 80.7 kg (178 lb).       Physical Exam  Constitutional:       Comments: Seen and discussed with his granddaughter.   HENT:      Head: Normocephalic and atraumatic.   Eyes:      Conjunctiva/sclera: Conjunctivae normal.      Pupils: Pupils are equal, round, and reactive to light.   Cardiovascular:      Rate and Rhythm: Normal rate and regular rhythm.      Heart sounds: Normal heart sounds.   Pulmonary:      Effort: Pulmonary effort is normal. No respiratory distress.      Breath sounds: Normal breath sounds.   Musculoskeletal:         General: No swelling.   Skin:     General: Skin is warm and dry.      Findings: No rash.      Comments: Wound to the posterior right heel is healing.   Neurological:      General: No focal deficit present.      Mental Status: He is alert and oriented to person, place, and time.   Psychiatric:         Mood and Affect: Mood normal.         Behavior: Behavior normal.         Thought Content: Thought content normal.         Judgment: Judgment normal.        Result Review :  Hemoglobin A1c was rechecked in the office today and was 8.0.  It was 8.0 in August 2023.         Assessment and Plan   Diagnoses and all orders for this visit:    1. Infrarenal abdominal aortic aneurysm (AAA) without rupture (Primary)    2. Type 2 diabetes mellitus without complication, without long-term current use of insulin  -     POC Glycated Hemoglobin, Total  -    "  Microalbumin / Creatinine Urine Ratio - Urine, Clean Catch    3. Benign essential HTN    4. Mild late onset Alzheimer's dementia without behavioral disturbance, psychotic disturbance, mood disturbance, or anxiety    5. History of intracranial hemorrhage    Presents today for follow-up.  He continues to live with his granddaughter here in [place] recovering from a stroke that he had last summer.  I am also his wife's primary care doctor as well as his granddaughter's primary care doctor.    He has an infrarenal abdominal aortic aneurysm greater than 8 cm.  I have not been privy to prior imaging.  Dr. Lucas has been.  He has seen the patient in the office recently.  He and I have discussed the patient's case by phone recently.  He is willing to offer endovascular repair to the patient.  However, he wanted for me to see the patient in the office again today and check on him overall as well as  his fitness for the procedure.  I do feel that he looks like he is in better physical shape than he was when I first saw him in September.  He has been able to gain weight.  The wound on his right heel is healing.  He is said to be progressing with physical therapy.  Despite his dementia, he does understand that if his AAA were to burst that it would be uniformly fatal.  He wants to get it fixed.  His granddaughter wants for him to get it fixed.  I spoke to Dr. Lucas again this afternoon and let him know that they want to proceed.  He will be scheduling the patient in the office again.    Hemoglobin A1c is stable at 8.0.  He did not tolerate metformin previously.  Continue glipizide.  Explained to his granddaughter that this should not be more aggressively treated due to his other conditions and age.  Will check microalbumin today.    Blood pressure is 140/58 in the office today.  Continue losartan.    Plan to have him back in 3 months or sooner if problems.         Follow Up   Return in about 3 months (around  5/16/2024) for Recheck.  Patient was given instructions and counseling regarding his condition or for health maintenance advice. Please see specific information pulled into the AVS if appropriate.      LIANE Turner DO       Electronically signed by ZENA Turner DO, 02/16/24, 4:22 PM CST.

## 2024-02-17 LAB
ALBUMIN/CREAT UR: 20 MG/G CREAT (ref 0–29)
CREAT UR-MCNC: 109.9 MG/DL
MICROALBUMIN UR-MCNC: 21.5 UG/ML

## 2024-02-19 ENCOUNTER — HOME CARE VISIT (OUTPATIENT)
Dept: HOME HEALTH SERVICES | Facility: CLINIC | Age: 79
End: 2024-02-19
Payer: MEDICARE

## 2024-02-19 PROCEDURE — G0300 HHS/HOSPICE OF LPN EA 15 MIN: HCPCS

## 2024-02-20 VITALS
TEMPERATURE: 97.5 F | DIASTOLIC BLOOD PRESSURE: 70 MMHG | SYSTOLIC BLOOD PRESSURE: 130 MMHG | RESPIRATION RATE: 18 BRPM | HEART RATE: 50 BPM | OXYGEN SATURATION: 99 %

## 2024-02-20 NOTE — HOME HEALTH
Routine Visit Note:    SKill/Education Provided: Assessment, education-fall prevention, wound care, photos and measurements obtained, supplies provided    Patient/Caregiver Response: Caregiver present during dressing changes, voices understanding with all education this visit.     Falls: No    Med Changes: No    Physician Contact: No     Plan for Next Visit: Assessment, education, wound care    Other: No s/s of Covid noted.

## 2024-02-23 ENCOUNTER — OFFICE VISIT (OUTPATIENT)
Dept: WOUND CARE | Facility: HOSPITAL | Age: 79
End: 2024-02-23
Payer: MEDICARE

## 2024-02-23 PROCEDURE — G0463 HOSPITAL OUTPT CLINIC VISIT: HCPCS

## 2024-02-26 ENCOUNTER — HOME CARE VISIT (OUTPATIENT)
Dept: HOME HEALTH SERVICES | Facility: CLINIC | Age: 79
End: 2024-02-26
Payer: MEDICARE

## 2024-02-26 PROCEDURE — G0299 HHS/HOSPICE OF RN EA 15 MIN: HCPCS

## 2024-02-27 VITALS
RESPIRATION RATE: 20 BRPM | HEART RATE: 66 BPM | DIASTOLIC BLOOD PRESSURE: 80 MMHG | OXYGEN SATURATION: 99 % | TEMPERATURE: 97 F | SYSTOLIC BLOOD PRESSURE: 126 MMHG

## 2024-02-27 NOTE — HOME HEALTH
Routine Visit Note:Pleasantly forgetful. Sitting on the couch. No skin breakdown on heels. Pt has been dc from wound care.    Skill/education provided: Pre d/c teaching done. Instructed wife to check feet daily and apply lotion to dry heels. Instructed pt to avoid pressure to heels    Patient/caregiver response: cooperative    Plan for next visit: dc    Other pertinent info: No s/s of covid 19

## 2024-03-01 ENCOUNTER — HOSPITAL ENCOUNTER (OUTPATIENT)
Dept: GENERAL RADIOLOGY | Age: 79
Discharge: HOME | End: 2024-03-01
Payer: MEDICARE

## 2024-03-01 ENCOUNTER — HOSPITAL ENCOUNTER (OUTPATIENT)
Dept: PREADMISSION TESTING | Age: 79
Discharge: HOME | End: 2024-03-05
Payer: MEDICARE

## 2024-03-01 VITALS — WEIGHT: 171 LBS | HEIGHT: 68 IN | BODY MASS INDEX: 25.91 KG/M2

## 2024-03-01 LAB
ABO/RH: NORMAL
ANION GAP SERPL CALCULATED.3IONS-SCNC: 13 MMOL/L (ref 7–19)
ANTIBODY SCREEN: NORMAL
APTT PPP: 30.1 SEC (ref 26–36.2)
BASOPHILS # BLD: 0 K/UL (ref 0–0.2)
BASOPHILS NFR BLD: 0.3 % (ref 0–1)
BUN SERPL-MCNC: 24 MG/DL (ref 8–23)
CALCIUM SERPL-MCNC: 10.2 MG/DL (ref 8.8–10.2)
CHLORIDE SERPL-SCNC: 100 MMOL/L (ref 98–111)
CO2 SERPL-SCNC: 28 MMOL/L (ref 22–29)
CREAT SERPL-MCNC: 0.9 MG/DL (ref 0.5–1.2)
EKG P AXIS: NORMAL DEGREES
EKG P-R INTERVAL: NORMAL MS
EKG Q-T INTERVAL: 448 MS
EKG QRS DURATION: 106 MS
EKG QTC CALCULATION (BAZETT): 440 MS
EKG T AXIS: 121 DEGREES
EOSINOPHIL # BLD: 0.2 K/UL (ref 0–0.6)
EOSINOPHIL NFR BLD: 1.8 % (ref 0–5)
ERYTHROCYTE [DISTWIDTH] IN BLOOD BY AUTOMATED COUNT: 12.6 % (ref 11.5–14.5)
GLUCOSE SERPL-MCNC: 235 MG/DL (ref 74–109)
HCT VFR BLD AUTO: 44.6 % (ref 42–52)
HGB BLD-MCNC: 14.3 G/DL (ref 14–18)
IMM GRANULOCYTES # BLD: 0 K/UL
INR PPP: 1.03 (ref 0.88–1.18)
LYMPHOCYTES # BLD: 1.9 K/UL (ref 1.1–4.5)
LYMPHOCYTES NFR BLD: 20.4 % (ref 20–40)
MCH RBC QN AUTO: 29.1 PG (ref 27–31)
MCHC RBC AUTO-ENTMCNC: 32.1 G/DL (ref 33–37)
MCV RBC AUTO: 90.7 FL (ref 80–94)
MONOCYTES # BLD: 0.6 K/UL (ref 0–0.9)
MONOCYTES NFR BLD: 5.9 % (ref 0–10)
MRSA DNA SPEC QL NAA+PROBE: NOT DETECTED
NEUTROPHILS # BLD: 6.8 K/UL (ref 1.5–7.5)
NEUTS SEG NFR BLD: 71.3 % (ref 50–65)
PLATELET # BLD AUTO: 212 K/UL (ref 130–400)
PMV BLD AUTO: 11.1 FL (ref 9.4–12.4)
POTASSIUM SERPL-SCNC: 4.4 MMOL/L (ref 3.5–5)
PROTHROMBIN TIME: 13.3 SEC (ref 12–14.6)
RBC # BLD AUTO: 4.92 M/UL (ref 4.7–6.1)
SODIUM SERPL-SCNC: 141 MMOL/L (ref 136–145)
WBC # BLD AUTO: 9.5 K/UL (ref 4.8–10.8)

## 2024-03-01 PROCEDURE — 86850 RBC ANTIBODY SCREEN: CPT

## 2024-03-01 PROCEDURE — 86900 BLOOD TYPING SEROLOGIC ABO: CPT

## 2024-03-01 PROCEDURE — 93010 ELECTROCARDIOGRAM REPORT: CPT | Performed by: INTERNAL MEDICINE

## 2024-03-01 PROCEDURE — 86901 BLOOD TYPING SEROLOGIC RH(D): CPT

## 2024-03-01 PROCEDURE — 87641 MR-STAPH DNA AMP PROBE: CPT

## 2024-03-01 PROCEDURE — 85730 THROMBOPLASTIN TIME PARTIAL: CPT

## 2024-03-01 PROCEDURE — 85610 PROTHROMBIN TIME: CPT

## 2024-03-01 PROCEDURE — 80048 BASIC METABOLIC PNL TOTAL CA: CPT

## 2024-03-01 PROCEDURE — 71046 X-RAY EXAM CHEST 2 VIEWS: CPT

## 2024-03-01 PROCEDURE — 93005 ELECTROCARDIOGRAM TRACING: CPT | Performed by: SURGERY

## 2024-03-01 PROCEDURE — 85025 COMPLETE CBC W/AUTO DIFF WBC: CPT

## 2024-03-01 RX ORDER — TETRAHYDROZOLINE HCL 0.05 %
1 DROPS OPHTHALMIC (EYE) 3 TIMES DAILY PRN
COMMUNITY

## 2024-03-01 RX ORDER — LOSARTAN POTASSIUM 100 MG/1
100 TABLET ORAL DAILY
COMMUNITY

## 2024-03-01 RX ORDER — TAMSULOSIN HYDROCHLORIDE 0.4 MG/1
0.4 CAPSULE ORAL NIGHTLY
COMMUNITY

## 2024-03-01 RX ORDER — DOCUSATE SODIUM 100 MG/1
100 CAPSULE, LIQUID FILLED ORAL 2 TIMES DAILY PRN
COMMUNITY

## 2024-03-01 RX ORDER — ATENOLOL 25 MG/1
25 TABLET ORAL DAILY
COMMUNITY

## 2024-03-01 RX ORDER — GLIPIZIDE 5 MG/1
5 TABLET ORAL DAILY
COMMUNITY

## 2024-03-01 RX ORDER — ATORVASTATIN CALCIUM 20 MG/1
20 TABLET, FILM COATED ORAL NIGHTLY
COMMUNITY

## 2024-03-01 RX ORDER — DIPHENHYDRAMINE HCL 25 MG
25 TABLET ORAL NIGHTLY PRN
COMMUNITY

## 2024-03-01 NOTE — DISCHARGE INSTRUCTIONS
PREOPERATIVE GUIDELINES WHEN RECEIVING ANESTHESIA    Do not eat or drink anything after midnight, the night before your surgery. No gum or candy the morning of surgery.  This is extremely important for your safety.    Take a bath (or shower) the night before your surgery and you may brush your teeth the morning of your surgery.    You will be scheduled to arrive at the hospital 2 hours before your surgery, or follow your surgeon's instructions.    Dress comfortably.  Wear loose clothing that will be easy to remove and comfortable for your trip home.    You may wear eyeglasses or contacts but bring your cases with you as they must be remove before your surgery.    Hearing aids and dentures will need to be removed before your surgery.    Do not wear any jewelry, including body jewelry.  All jewelry will need to be removed prior to your surgery.    Do not wear fingernail polish or make-up.    It is best not to bring any valuables with you.    If you are to stay in the hospital overnight, bring your robe, slippers and personal toiletries that you may need.      POSTOPERATIVE GUIDELINES AFTER RECEIVING ANESTHESIA    If you are to go home after your surgery, you will need a responsible adult to drive you home.     You will not be able to take public transportation after your discharge from the Operative Care Unit unless you are accompanied by a        responsible adult.    On returning home, be sure to follow your physician's orders regarding diet, activity and medications.    Remember, surgery with general anesthesia or sedation may leave you sleepy, very tired and with a decreased appetite for 12 to 24 hours.    If you develop any post-surgical complications or problems, call your surgeon or [facility] ([phone]).      The day before surgery you will receive a phone call from the surgery nurse to let you know what time to arrive on the day of surgery. This call will usually be between 2-4 PM. If

## 2024-03-04 ENCOUNTER — HOME CARE VISIT (OUTPATIENT)
Dept: HOME HEALTH SERVICES | Facility: CLINIC | Age: 79
End: 2024-03-04
Payer: MEDICARE

## 2024-03-04 PROCEDURE — G0299 HHS/HOSPICE OF RN EA 15 MIN: HCPCS

## 2024-03-04 RX ORDER — LOSARTAN POTASSIUM 100 MG/1
100 TABLET ORAL DAILY
Qty: 90 TABLET | Refills: 3 | Status: SHIPPED | OUTPATIENT
Start: 2024-03-04

## 2024-03-04 NOTE — TELEPHONE ENCOUNTER
Caller: Bert Pereira    Relationship: Self    Best call back number: [phone]    Requested Prescriptions:   Requested Prescriptions     Pending Prescriptions Disp Refills    losartan (COZAAR) 100 MG tablet       Sig: Take 1 tablet by mouth Daily. Indications: High Blood Pressure Disorder        Pharmacy where request should be sent: [facility] [address] [place] [address] [phone] PH - [phone]      Last office visit with prescribing clinician: 2/16/2024   Last telemedicine visit with prescribing clinician: Visit date not found   Next office visit with prescribing clinician: 5/10/2024     Additional details provided by patient: PATIENT ONLY HAS 1 PILL LEFT     Does the patient have less than a 3 day supply:  [x] Yes  [] No    Would you like a call back once the refill request has been completed: [] Yes [x] No      Anisa Robb, Mallory Rep   03/04/24 13:31 CST

## 2024-03-04 NOTE — Clinical Note
Discharge Summary/Summery  of Care Provided: Patient received physical therapy for strengthening and safety and SN for cp, diabetes home managment , med teaching and compliance, and wound care  Patient received home health for diagnosis: Diabetes melitus with skin complications  Current level of functional ability: Amb with walker with stand by assist  Living arrangements: Lives with family  Progress towards goals and/or Were all goals met? Wd resolved. PT goals met.  If not all goals met, barriers that prevented patient from meeting goals: N/A  SDOH concerns (i.e. Caregiver availability, social isolation, environment, income, transportation access, food insecurity etc.)No concerns  Follow-up with MD as ordered. Continue meds as ordered

## 2024-03-06 VITALS — DIASTOLIC BLOOD PRESSURE: 70 MMHG | TEMPERATURE: 97 F | SYSTOLIC BLOOD PRESSURE: 112 MMHG | HEART RATE: 62 BPM

## 2024-03-06 NOTE — HOME HEALTH
Discharge Summary/Sums and community resources provided: Instructedmary of Care Provided: Patient received physical therapy for strengthening and safety and SN for cp, diabetes home managment , med teaching and compliance, and wound care  Patient received home health for diagnosis: Diabetes melitus with skin complications  Current level of functional ability: Amb with walker with stand by assist  Living arrangements: Lives with family  Progress towards goals and/or Were all goals met? Wd resolved. PT goals met.  If not all goals met, barriers that prevented patient from meeting goals: N/A  SDOH concerns (i.e. Caregiver availability, social isolation, environment, income, transportation access, food insecurity etc.)No concerns  Follow-up appointment plan to keep MD appointments and continue meds as ordered

## 2024-03-12 ENCOUNTER — HOSPITAL ENCOUNTER (INPATIENT)
Age: 79
LOS: 1 days | Discharge: HOME | DRG: 269 | End: 2024-03-13
Attending: SURGERY | Admitting: SURGERY
Payer: MEDICARE

## 2024-03-12 ENCOUNTER — ANESTHESIA (OUTPATIENT)
Dept: OPERATING ROOM | Age: 79
DRG: 269 | End: 2024-03-12
Payer: MEDICARE

## 2024-03-12 ENCOUNTER — ANESTHESIA EVENT (OUTPATIENT)
Dept: OPERATING ROOM | Age: 79
DRG: 269 | End: 2024-03-12
Payer: MEDICARE

## 2024-03-12 ENCOUNTER — APPOINTMENT (OUTPATIENT)
Dept: INTERVENTIONAL RADIOLOGY/VASCULAR | Age: 79
DRG: 269 | End: 2024-03-12
Attending: SURGERY
Payer: MEDICARE

## 2024-03-12 PROBLEM — I71.40 AAA (ABDOMINAL AORTIC ANEURYSM) WITHOUT RUPTURE (HCC): Status: ACTIVE | Noted: 2024-03-12

## 2024-03-12 LAB
ABO/RH: NORMAL
ANTIBODY SCREEN: NORMAL
GLUCOSE BLD-MCNC: 156 MG/DL (ref 70–99)
GLUCOSE BLD-MCNC: 175 MG/DL (ref 70–99)
GLUCOSE BLD-MCNC: 186 MG/DL (ref 70–99)
GLUCOSE BLD-MCNC: 239 MG/DL (ref 70–99)
GLUCOSE BLD-MCNC: 240 MG/DL (ref 70–99)
PERFORMED ON: ABNORMAL

## 2024-03-12 PROCEDURE — 047F3ZZ DILATION OF LEFT INTERNAL ILIAC ARTERY, PERCUTANEOUS APPROACH: ICD-10-PCS | Performed by: SURGERY

## 2024-03-12 PROCEDURE — 03HY32Z INSERTION OF MONITORING DEVICE INTO UPPER ARTERY, PERCUTANEOUS APPROACH: ICD-10-PCS | Performed by: SURGERY

## 2024-03-12 PROCEDURE — C1894 INTRO/SHEATH, NON-LASER: HCPCS | Performed by: SURGERY

## 2024-03-12 PROCEDURE — 86850 RBC ANTIBODY SCREEN: CPT

## 2024-03-12 PROCEDURE — 7100000000 HC PACU RECOVERY - FIRST 15 MIN: Performed by: SURGERY

## 2024-03-12 PROCEDURE — 3700000000 HC ANESTHESIA ATTENDED CARE: Performed by: SURGERY

## 2024-03-12 PROCEDURE — 2580000003 HC RX 258: Performed by: NURSE ANESTHETIST, CERTIFIED REGISTERED

## 2024-03-12 PROCEDURE — 3700000001 HC ADD 15 MINUTES (ANESTHESIA): Performed by: SURGERY

## 2024-03-12 PROCEDURE — 3600000007 HC SURGERY HYBRID BASE: Performed by: SURGERY

## 2024-03-12 PROCEDURE — 047E3ZZ DILATION OF RIGHT INTERNAL ILIAC ARTERY, PERCUTANEOUS APPROACH: ICD-10-PCS | Performed by: SURGERY

## 2024-03-12 PROCEDURE — 36415 COLL VENOUS BLD VENIPUNCTURE: CPT

## 2024-03-12 PROCEDURE — 86900 BLOOD TYPING SEROLOGIC ABO: CPT

## 2024-03-12 PROCEDURE — 4A133B1 MONITORING OF ARTERIAL PRESSURE, PERIPHERAL, PERCUTANEOUS APPROACH: ICD-10-PCS | Performed by: SURGERY

## 2024-03-12 PROCEDURE — C2628 CATHETER, OCCLUSION: HCPCS | Performed by: SURGERY

## 2024-03-12 PROCEDURE — 2500000003 HC RX 250 WO HCPCS: Performed by: NURSE ANESTHETIST, CERTIFIED REGISTERED

## 2024-03-12 PROCEDURE — B41G1ZZ FLUOROSCOPY OF LEFT LOWER EXTREMITY ARTERIES USING LOW OSMOLAR CONTRAST: ICD-10-PCS | Performed by: SURGERY

## 2024-03-12 PROCEDURE — 2580000003 HC RX 258: Performed by: SURGERY

## 2024-03-12 PROCEDURE — 4A133J1 MONITORING OF ARTERIAL PULSE, PERIPHERAL, PERCUTANEOUS APPROACH: ICD-10-PCS | Performed by: SURGERY

## 2024-03-12 PROCEDURE — 6360000002 HC RX W HCPCS: Performed by: NURSE ANESTHETIST, CERTIFIED REGISTERED

## 2024-03-12 PROCEDURE — A4217 STERILE WATER/SALINE, 500 ML: HCPCS | Performed by: SURGERY

## 2024-03-12 PROCEDURE — 04V03EZ RESTRICTION OF ABDOMINAL AORTA WITH BRANCHED OR FENESTRATED INTRALUMINAL DEVICE, ONE OR TWO ARTERIES, PERCUTANEOUS APPROACH: ICD-10-PCS | Performed by: SURGERY

## 2024-03-12 PROCEDURE — B4101ZZ FLUOROSCOPY OF ABDOMINAL AORTA USING LOW OSMOLAR CONTRAST: ICD-10-PCS | Performed by: SURGERY

## 2024-03-12 PROCEDURE — 94760 N-INVAS EAR/PLS OXIMETRY 1: CPT

## 2024-03-12 PROCEDURE — B41F1ZZ FLUOROSCOPY OF RIGHT LOWER EXTREMITY ARTERIES USING LOW OSMOLAR CONTRAST: ICD-10-PCS | Performed by: SURGERY

## 2024-03-12 PROCEDURE — 6360000002 HC RX W HCPCS: Performed by: SURGERY

## 2024-03-12 PROCEDURE — C1893 INTRO/SHEATH, FIXED,NON-PEEL: HCPCS | Performed by: SURGERY

## 2024-03-12 PROCEDURE — 2700000000 HC OXYGEN THERAPY PER DAY

## 2024-03-12 PROCEDURE — 7100000001 HC PACU RECOVERY - ADDTL 15 MIN: Performed by: SURGERY

## 2024-03-12 PROCEDURE — 2580000003 HC RX 258: Performed by: ANESTHESIOLOGY

## 2024-03-12 PROCEDURE — C1760 CLOSURE DEV, VASC: HCPCS | Performed by: SURGERY

## 2024-03-12 PROCEDURE — C1887 CATHETER, GUIDING: HCPCS | Performed by: SURGERY

## 2024-03-12 PROCEDURE — 86901 BLOOD TYPING SEROLOGIC RH(D): CPT

## 2024-03-12 PROCEDURE — 3600000017 HC SURGERY HYBRID ADDL 15MIN: Performed by: SURGERY

## 2024-03-12 PROCEDURE — 6370000000 HC RX 637 (ALT 250 FOR IP): Performed by: SURGERY

## 2024-03-12 PROCEDURE — C1768 GRAFT, VASCULAR: HCPCS | Performed by: SURGERY

## 2024-03-12 PROCEDURE — 1200000000 HC SEMI PRIVATE

## 2024-03-12 PROCEDURE — 2709999900 HC NON-CHARGEABLE SUPPLY: Performed by: SURGERY

## 2024-03-12 PROCEDURE — 82962 GLUCOSE BLOOD TEST: CPT

## 2024-03-12 PROCEDURE — C1769 GUIDE WIRE: HCPCS | Performed by: SURGERY

## 2024-03-12 DEVICE — GRAFT EVAR L13.5CM DST DIA16MM FEP NIT CONTRALATERAL LEG IL: Type: IMPLANTABLE DEVICE | Status: FUNCTIONAL

## 2024-03-12 DEVICE — GRAFT EVAR L11.5CM DST DIA16MM FEP NIT CONTRALATERAL LEG IL: Type: IMPLANTABLE DEVICE | Status: FUNCTIONAL

## 2024-03-12 DEVICE — GRAFT STENT TRUNK IPSILATERAL LEG 28.5X14.5 MM CM EXCLUDER: Type: IMPLANTABLE DEVICE | Status: FUNCTIONAL

## 2024-03-12 RX ORDER — DIPHENHYDRAMINE HCL 25 MG
25 TABLET ORAL NIGHTLY PRN
Status: DISCONTINUED | OUTPATIENT
Start: 2024-03-12 | End: 2024-03-13 | Stop reason: HOSPADM

## 2024-03-12 RX ORDER — POLYETHYLENE GLYCOL 3350 17 G/17G
17 POWDER, FOR SOLUTION ORAL DAILY PRN
Status: DISCONTINUED | OUTPATIENT
Start: 2024-03-12 | End: 2024-03-13 | Stop reason: HOSPADM

## 2024-03-12 RX ORDER — SODIUM CHLORIDE 0.9 % (FLUSH) 0.9 %
5-40 SYRINGE (ML) INJECTION EVERY 12 HOURS SCHEDULED
Status: DISCONTINUED | OUTPATIENT
Start: 2024-03-12 | End: 2024-03-12 | Stop reason: HOSPADM

## 2024-03-12 RX ORDER — HEPARIN SODIUM 1000 [USP'U]/ML
INJECTION, SOLUTION INTRAVENOUS; SUBCUTANEOUS PRN
Status: DISCONTINUED | OUTPATIENT
Start: 2024-03-12 | End: 2024-03-12 | Stop reason: SDUPTHER

## 2024-03-12 RX ORDER — DOCUSATE SODIUM 100 MG/1
100 CAPSULE, LIQUID FILLED ORAL 2 TIMES DAILY PRN
Status: DISCONTINUED | OUTPATIENT
Start: 2024-03-12 | End: 2024-03-13 | Stop reason: HOSPADM

## 2024-03-12 RX ORDER — INSULIN LISPRO 100 [IU]/ML
0-8 INJECTION, SOLUTION INTRAVENOUS; SUBCUTANEOUS
Status: DISCONTINUED | OUTPATIENT
Start: 2024-03-12 | End: 2024-03-13 | Stop reason: HOSPADM

## 2024-03-12 RX ORDER — GLIPIZIDE 5 MG/1
5 TABLET ORAL DAILY
Status: DISCONTINUED | OUTPATIENT
Start: 2024-03-12 | End: 2024-03-13 | Stop reason: HOSPADM

## 2024-03-12 RX ORDER — FENTANYL CITRATE 50 UG/ML
50 INJECTION, SOLUTION INTRAMUSCULAR; INTRAVENOUS EVERY 5 MIN PRN
Status: DISCONTINUED | OUTPATIENT
Start: 2024-03-12 | End: 2024-03-12 | Stop reason: HOSPADM

## 2024-03-12 RX ORDER — SODIUM CHLORIDE 9 MG/ML
INJECTION, SOLUTION INTRAVENOUS CONTINUOUS
Status: ACTIVE | OUTPATIENT
Start: 2024-03-12 | End: 2024-03-12

## 2024-03-12 RX ORDER — FENTANYL CITRATE 50 UG/ML
INJECTION, SOLUTION INTRAMUSCULAR; INTRAVENOUS PRN
Status: DISCONTINUED | OUTPATIENT
Start: 2024-03-12 | End: 2024-03-12 | Stop reason: SDUPTHER

## 2024-03-12 RX ORDER — INSULIN LISPRO 100 [IU]/ML
0-4 INJECTION, SOLUTION INTRAVENOUS; SUBCUTANEOUS NIGHTLY
Status: DISCONTINUED | OUTPATIENT
Start: 2024-03-12 | End: 2024-03-13 | Stop reason: HOSPADM

## 2024-03-12 RX ORDER — OXYCODONE HYDROCHLORIDE 10 MG/1
10 TABLET ORAL EVERY 4 HOURS PRN
Status: DISCONTINUED | OUTPATIENT
Start: 2024-03-12 | End: 2024-03-13 | Stop reason: HOSPADM

## 2024-03-12 RX ORDER — DEXTROSE MONOHYDRATE 100 MG/ML
INJECTION, SOLUTION INTRAVENOUS CONTINUOUS PRN
Status: DISCONTINUED | OUTPATIENT
Start: 2024-03-12 | End: 2024-03-13 | Stop reason: HOSPADM

## 2024-03-12 RX ORDER — CLONIDINE HYDROCHLORIDE 0.1 MG/1
0.1 TABLET ORAL
Status: DISCONTINUED | OUTPATIENT
Start: 2024-03-12 | End: 2024-03-13 | Stop reason: HOSPADM

## 2024-03-12 RX ORDER — ATORVASTATIN CALCIUM 20 MG/1
20 TABLET, FILM COATED ORAL NIGHTLY
Status: DISCONTINUED | OUTPATIENT
Start: 2024-03-12 | End: 2024-03-13 | Stop reason: HOSPADM

## 2024-03-12 RX ORDER — HYDRALAZINE HYDROCHLORIDE 20 MG/ML
10 INJECTION INTRAMUSCULAR; INTRAVENOUS
Status: DISCONTINUED | OUTPATIENT
Start: 2024-03-12 | End: 2024-03-13 | Stop reason: HOSPADM

## 2024-03-12 RX ORDER — SODIUM CHLORIDE 9 MG/ML
INJECTION, SOLUTION INTRAVENOUS PRN
Status: DISCONTINUED | OUTPATIENT
Start: 2024-03-12 | End: 2024-03-12 | Stop reason: HOSPADM

## 2024-03-12 RX ORDER — ROCURONIUM BROMIDE 10 MG/ML
INJECTION, SOLUTION INTRAVENOUS PRN
Status: DISCONTINUED | OUTPATIENT
Start: 2024-03-12 | End: 2024-03-12 | Stop reason: SDUPTHER

## 2024-03-12 RX ORDER — TAMSULOSIN HYDROCHLORIDE 0.4 MG/1
0.4 CAPSULE ORAL NIGHTLY
Status: DISCONTINUED | OUTPATIENT
Start: 2024-03-12 | End: 2024-03-13 | Stop reason: HOSPADM

## 2024-03-12 RX ORDER — SUCCINYLCHOLINE/SOD CL,ISO/PF 100 MG/5ML
SYRINGE (ML) INTRAVENOUS PRN
Status: DISCONTINUED | OUTPATIENT
Start: 2024-03-12 | End: 2024-03-12 | Stop reason: SDUPTHER

## 2024-03-12 RX ORDER — PROPOFOL 10 MG/ML
INJECTION, EMULSION INTRAVENOUS PRN
Status: DISCONTINUED | OUTPATIENT
Start: 2024-03-12 | End: 2024-03-12 | Stop reason: SDUPTHER

## 2024-03-12 RX ORDER — ONDANSETRON 2 MG/ML
4 INJECTION INTRAMUSCULAR; INTRAVENOUS
Status: DISCONTINUED | OUTPATIENT
Start: 2024-03-12 | End: 2024-03-12 | Stop reason: HOSPADM

## 2024-03-12 RX ORDER — ONDANSETRON 4 MG/1
4 TABLET, ORALLY DISINTEGRATING ORAL EVERY 8 HOURS PRN
Status: DISCONTINUED | OUTPATIENT
Start: 2024-03-12 | End: 2024-03-13 | Stop reason: HOSPADM

## 2024-03-12 RX ORDER — OXYCODONE HYDROCHLORIDE 5 MG/1
5 TABLET ORAL EVERY 4 HOURS PRN
Status: DISCONTINUED | OUTPATIENT
Start: 2024-03-12 | End: 2024-03-13 | Stop reason: HOSPADM

## 2024-03-12 RX ORDER — TETRAHYDROZOLINE HCL 0.05 %
1 DROPS OPHTHALMIC (EYE) 3 TIMES DAILY PRN
Status: DISCONTINUED | OUTPATIENT
Start: 2024-03-12 | End: 2024-03-13 | Stop reason: HOSPADM

## 2024-03-12 RX ORDER — NALOXONE HYDROCHLORIDE 0.4 MG/ML
INJECTION, SOLUTION INTRAMUSCULAR; INTRAVENOUS; SUBCUTANEOUS PRN
Status: DISCONTINUED | OUTPATIENT
Start: 2024-03-12 | End: 2024-03-12 | Stop reason: HOSPADM

## 2024-03-12 RX ORDER — ONDANSETRON 2 MG/ML
4 INJECTION INTRAMUSCULAR; INTRAVENOUS EVERY 6 HOURS PRN
Status: DISCONTINUED | OUTPATIENT
Start: 2024-03-12 | End: 2024-03-13 | Stop reason: HOSPADM

## 2024-03-12 RX ORDER — SODIUM CHLORIDE 0.9 % (FLUSH) 0.9 %
5-40 SYRINGE (ML) INJECTION PRN
Status: DISCONTINUED | OUTPATIENT
Start: 2024-03-12 | End: 2024-03-13 | Stop reason: HOSPADM

## 2024-03-12 RX ORDER — SODIUM CHLORIDE 9 MG/ML
INJECTION, SOLUTION INTRAVENOUS PRN
Status: DISCONTINUED | OUTPATIENT
Start: 2024-03-12 | End: 2024-03-13 | Stop reason: HOSPADM

## 2024-03-12 RX ORDER — SODIUM CHLORIDE 0.9 % (FLUSH) 0.9 %
5-40 SYRINGE (ML) INJECTION EVERY 12 HOURS SCHEDULED
Status: DISCONTINUED | OUTPATIENT
Start: 2024-03-12 | End: 2024-03-13 | Stop reason: HOSPADM

## 2024-03-12 RX ORDER — EPHEDRINE SULFATE 50 MG/ML
INJECTION, SOLUTION INTRAVENOUS PRN
Status: DISCONTINUED | OUTPATIENT
Start: 2024-03-12 | End: 2024-03-12 | Stop reason: SDUPTHER

## 2024-03-12 RX ORDER — HYDROMORPHONE HYDROCHLORIDE 1 MG/ML
0.5 INJECTION, SOLUTION INTRAMUSCULAR; INTRAVENOUS; SUBCUTANEOUS
Status: DISCONTINUED | OUTPATIENT
Start: 2024-03-12 | End: 2024-03-13 | Stop reason: HOSPADM

## 2024-03-12 RX ORDER — SODIUM CHLORIDE, SODIUM LACTATE, POTASSIUM CHLORIDE, CALCIUM CHLORIDE 600; 310; 30; 20 MG/100ML; MG/100ML; MG/100ML; MG/100ML
INJECTION, SOLUTION INTRAVENOUS CONTINUOUS
Status: DISCONTINUED | OUTPATIENT
Start: 2024-03-12 | End: 2024-03-12 | Stop reason: HOSPADM

## 2024-03-12 RX ORDER — HYDROMORPHONE HYDROCHLORIDE 1 MG/ML
0.25 INJECTION, SOLUTION INTRAMUSCULAR; INTRAVENOUS; SUBCUTANEOUS
Status: DISCONTINUED | OUTPATIENT
Start: 2024-03-12 | End: 2024-03-13 | Stop reason: HOSPADM

## 2024-03-12 RX ORDER — SODIUM CHLORIDE 0.9 % (FLUSH) 0.9 %
5-40 SYRINGE (ML) INJECTION PRN
Status: DISCONTINUED | OUTPATIENT
Start: 2024-03-12 | End: 2024-03-12 | Stop reason: HOSPADM

## 2024-03-12 RX ORDER — ONDANSETRON 2 MG/ML
INJECTION INTRAMUSCULAR; INTRAVENOUS PRN
Status: DISCONTINUED | OUTPATIENT
Start: 2024-03-12 | End: 2024-03-12 | Stop reason: SDUPTHER

## 2024-03-12 RX ORDER — LIDOCAINE HYDROCHLORIDE 10 MG/ML
INJECTION, SOLUTION INFILTRATION; PERINEURAL PRN
Status: DISCONTINUED | OUTPATIENT
Start: 2024-03-12 | End: 2024-03-12 | Stop reason: SDUPTHER

## 2024-03-12 RX ORDER — LOSARTAN POTASSIUM 100 MG/1
100 TABLET ORAL DAILY
Status: DISCONTINUED | OUTPATIENT
Start: 2024-03-12 | End: 2024-03-13 | Stop reason: HOSPADM

## 2024-03-12 RX ORDER — FENTANYL CITRATE 50 UG/ML
25 INJECTION, SOLUTION INTRAMUSCULAR; INTRAVENOUS EVERY 5 MIN PRN
Status: DISCONTINUED | OUTPATIENT
Start: 2024-03-12 | End: 2024-03-12 | Stop reason: HOSPADM

## 2024-03-12 RX ADMIN — ROCURONIUM BROMIDE 50 MG: 10 INJECTION, SOLUTION INTRAVENOUS at 07:47

## 2024-03-12 RX ADMIN — EPHEDRINE SULFATE 10 MG: 50 INJECTION INTRAMUSCULAR; INTRAVENOUS; SUBCUTANEOUS at 08:19

## 2024-03-12 RX ADMIN — SODIUM CHLORIDE: 9 INJECTION, SOLUTION INTRAVENOUS at 11:36

## 2024-03-12 RX ADMIN — INSULIN LISPRO 2 UNITS: 100 INJECTION, SOLUTION INTRAVENOUS; SUBCUTANEOUS at 11:48

## 2024-03-12 RX ADMIN — HYDROMORPHONE HYDROCHLORIDE 0.25 MG: 1 INJECTION, SOLUTION INTRAMUSCULAR; INTRAVENOUS; SUBCUTANEOUS at 11:30

## 2024-03-12 RX ADMIN — TAMSULOSIN HYDROCHLORIDE 0.4 MG: 0.4 CAPSULE ORAL at 20:04

## 2024-03-12 RX ADMIN — SODIUM CHLORIDE, PRESERVATIVE FREE 10 ML: 5 INJECTION INTRAVENOUS at 20:05

## 2024-03-12 RX ADMIN — HEPARIN SODIUM 6000 UNITS: 1000 INJECTION, SOLUTION INTRAVENOUS; SUBCUTANEOUS at 08:26

## 2024-03-12 RX ADMIN — FENTANYL CITRATE 50 MCG: 0.05 INJECTION, SOLUTION INTRAMUSCULAR; INTRAVENOUS at 09:13

## 2024-03-12 RX ADMIN — FENTANYL CITRATE 100 MCG: 0.05 INJECTION, SOLUTION INTRAMUSCULAR; INTRAVENOUS at 07:47

## 2024-03-12 RX ADMIN — LIDOCAINE HYDROCHLORIDE 50 MG: 10 INJECTION, SOLUTION INFILTRATION; PERINEURAL at 07:47

## 2024-03-12 RX ADMIN — PHENYLEPHRINE HYDROCHLORIDE 50 MCG/MIN: 10 INJECTION INTRAVENOUS at 07:54

## 2024-03-12 RX ADMIN — LIDOCAINE HYDROCHLORIDE 50 MG: 10 INJECTION, SOLUTION INFILTRATION; PERINEURAL at 09:22

## 2024-03-12 RX ADMIN — DIPHENHYDRAMINE HYDROCHLORIDE 25 MG: 25 TABLET ORAL at 20:04

## 2024-03-12 RX ADMIN — ATORVASTATIN CALCIUM 20 MG: 20 TABLET, FILM COATED ORAL at 20:04

## 2024-03-12 RX ADMIN — OXYCODONE HYDROCHLORIDE 10 MG: 10 TABLET ORAL at 16:41

## 2024-03-12 RX ADMIN — PROPOFOL 150 MG: 10 INJECTION, EMULSION INTRAVENOUS at 07:47

## 2024-03-12 RX ADMIN — SODIUM CHLORIDE, POTASSIUM CHLORIDE, SODIUM LACTATE AND CALCIUM CHLORIDE: 600; 310; 30; 20 INJECTION, SOLUTION INTRAVENOUS at 06:28

## 2024-03-12 RX ADMIN — ONDANSETRON 4 MG: 2 INJECTION INTRAMUSCULAR; INTRAVENOUS at 09:18

## 2024-03-12 RX ADMIN — SUGAMMADEX 150 MG: 100 INJECTION, SOLUTION INTRAVENOUS at 09:17

## 2024-03-12 RX ADMIN — FENTANYL CITRATE 100 MCG: 0.05 INJECTION, SOLUTION INTRAMUSCULAR; INTRAVENOUS at 08:25

## 2024-03-12 RX ADMIN — Medication 140 MG: at 07:47

## 2024-03-12 RX ADMIN — CEFAZOLIN 1000 MG: 1 INJECTION, POWDER, FOR SOLUTION INTRAMUSCULAR; INTRAVENOUS at 07:58

## 2024-03-12 ASSESSMENT — PAIN - FUNCTIONAL ASSESSMENT: PAIN_FUNCTIONAL_ASSESSMENT: NONE - DENIES PAIN

## 2024-03-12 NOTE — ANESTHESIA PROCEDURE NOTES
Arterial Line:    An arterial line was placed using surface landmarks, in the pre-op for the following indication(s): continuous blood pressure monitoring and blood sampling needed.    A 20 gauge (size) (length), Arrow (type) catheter was placed, Seldinger technique used, into the left radial artery, secured by tape and Tegaderm.  Anesthesia type: Local    Events:  patient tolerated procedure well with no complications.3/12/2024 7:23 AM3/12/2024 7:23 AM  Anesthesiologist: Dayron Parker MD  Performed: Anesthesiologist   Preanesthetic Checklist  Completed: patient identified, IV checked, site marked, risks and benefits discussed, surgical/procedural consents, equipment checked, pre-op evaluation, timeout performed, anesthesia consent given, oxygen available, monitors applied/VS acknowledged, fire risk safety assessment completed and verbalized and blood product R/B/A discussed and consented

## 2024-03-12 NOTE — PROGRESS NOTES
4 Eyes Skin Assessment     NAME:  Mo Brown  YOB: 1945  MEDICAL RECORD NUMBER:  [id number]    The patient is being assessed for  Admission    I agree that at least one RN has performed a thorough Head to Toe Skin Assessment on the patient. ALL assessment sites listed below have been assessed.      Areas assessed by both nurses:    Head, Face, Ears, Shoulders, Back, Chest, Arms, Elbows, Hands, Sacrum. Buttock, Coccyx, Ischium, Legs. Feet and Heels, and Under Medical Devices         Does the Patient have a Wound? No noted wound(s)       Parish Prevention initiated by RN: Yes  Wound Care Orders initiated by RN: No    Pressure Injury (Stage 3,4, Unstageable, DTI, NWPT, and Complex wounds) if present, place Wound referral order by RN under : No    New Ostomies, if present place, Ostomy referral order under : No     Nurse 1 eSignature: Electronically signed by Kellie Mishra RN on 3/12/24 at 12:52 PM CDT    **SHARE this note so that the co-signing nurse can place an eSignature**    Nurse 2 eSignature: Electronically signed by Qing Zhang RN on 3/12/24 at 6:59 PM CDT

## 2024-03-12 NOTE — H&P
Vascular surgery history and physical    HPI        Joss Brown is an 78-year-old  man with history of diabetes, hyperlipidemia, hypertension, benign prostatic hypertrophy, who presents today with a greater than 8 cm infrarenal abdominal aortic aneurysm.  He is asymptomatic.  However, secondary to the large size, we recommend repair.    Mo Brown is a 78 y.o. male with the following history reviewed and [facility] in [facility]:  There are no problems to display for this patient.    Current Facility-Administered Medications   Medication Dose Route Frequency Provider Last Rate Last Admin    ceFAZolin (ANCEF) 1,000 mg in sterile water 10 mL IV syringe  1,000 mg IntraVENous Once Suleman Emmanuel MD        lactated ringers IV soln infusion   IntraVENous Continuous Lawson Galvan  mL/hr at 03/12/24 0628 New Bag at 03/12/24 0628     Allergies: Patient has no known allergies.  Past Medical History:   Diagnosis Date    AAA (abdominal aortic aneurysm) (HCC)     BPH (benign prostatic hyperplasia)     Cerebral artery occlusion with cerebral infarction (HCC)     x4    COVID-19 09/2023    hallucinations; was tx inpatient    Diabetes mellitus (HCC)     Hyperlipidemia     Hypertension      Past Surgical History:   Procedure Laterality Date    EYE SURGERY Bilateral     cat     History reviewed. No pertinent family history.  Social History     Tobacco Use    Smoking status: Former     Types: Cigarettes    Smokeless tobacco: Never   Substance Use Topics    Alcohol use: Not Currently         Review of Systems    Constitutional - no significant activity change, appetite change, or unexpected weight change.  No fever or chills.  No diaphoresis or significant fatigue.  Eyes - no sudden vision change or amaurosis.  Respiratory - no significant shortness of breath, wheezing, or stridor.  No apnea, cough, or chest tightness associated with shortness of breath.  Cardiovascular - no chest pain, syncope, or significant

## 2024-03-12 NOTE — OP NOTE
Operative Note      Patient: Mo Brown  YOB: 1945  MRN: [id number]    Date of Procedure: 3/12/2024    Preoperative diagnosis:  1.  Asymptomatic infrarenal abdominal aortic aneurysm greater than 8 cm diameter    Postoperative diagnosis: Same    Operative procedure:  1.  Ultrasound-guided cannulation bilateral common femoral arteries with 18 Georgian Medimont dry seal right, 12 Georgian left  2.  Preclose bilateral common femoral artery sheath sites with 2 ProGlide closure devices on each side  3.  Suprarenal abdominal aortogram with bilateral iliofemoral arteriogram  4.  Endovascular aneurysm repair (EVAR) of infrarenal abdominal aortic aneurysm with Medimont excluder device (main body 28.5 x 14.5 x 12, right iliac limb 16 x 12, left contralateral limb 16 x 14)  5.  Balloon angioplasty of endoluminal graft main body and limbs with molding and occlusion balloon  6.  Completion suprarenal abdominal aortogram with bilateral iliofemoral arteriogram  8.  Pro-glide closure of bilateral common femoral artery sheath sites    Surgeon: Suleman Emmanuel MD    Anesthesia: General endotracheal    Estimated blood loss: 50 mL      Implants:  Implant Name Type Inv. Item Serial No.  Lot No. LRB No. Used Action   GRAFT STENT TRUNK IPSILATERAL LEG 28.5X14.5 MM CM EXCLUDER - [id number] Vascular grafts GRAFT STENT TRUNK IPSILATERAL LEG 28.5X14.5 MM CM EXCLUDER [id number] [employer]-  Right 1 Implanted   GRAFT EVAR L13.5CM DST [id number] FEP NIT CONTRALATERAL LEG [place] - [id number] Endografts GRAFT EVAR L13.5CM DST [id number] FEP NIT CONTRALATERAL LEG [place] [id number] [employer]-WD  Left 1 Implanted   GRAFT EVAR L11.5CM DST [id number] FEP NIT CONTRALATERAL LEG [place] - [id number] Endografts GRAFT EVAR L11.5CM DST [id number] FEP NIT CONTRALATERAL LEG [place] [id number] [employer]-  Right 1 Implanted         Drains:   Urinary Catheter 03/12/24 2 Way (Active)       Findings:  1.  Successful endovascular repair

## 2024-03-12 NOTE — INTERVAL H&P NOTE
I agree with the history and physical exam in chart.  In addition, today's complete ROS was performed and the only positives are noted in the HPI.  I examined the patient preoperatively and discussed the surgery, including the risks, benefits, and alternatives.  They seem to understand and agree to proceed.

## 2024-03-12 NOTE — ANESTHESIA POSTPROCEDURE EVALUATION
Department of Anesthesiology  Postprocedure Note    Patient: Mo Brown  MRN: [id number]  YOB: 1945  Date of evaluation: 3/12/2024    Procedure Summary       Date: 03/12/24 Room / Location: [address]    Anesthesia Start: 0736 Anesthesia Stop: 0941    Procedure: ABDOMINAL AORTIC ANEURYSM REPAIR EVAR Diagnosis:       Infrarenal abdominal aortic aneurysm, without rupture (HCC)      (Infrarenal abdominal aortic aneurysm, without rupture (HCC) [I71.43])    Surgeons: Suleman Emmanuel MD Responsible Provider: Jhonathan Osorio APRN - CRNA    Anesthesia Type: general ASA Status: 3            Anesthesia Type: No value filed.    Tamika Phase I: Tamika Score: 5    Tamika Phase II:      Anesthesia Post Evaluation    Patient location during evaluation: PACU  Patient participation: complete - patient participated  Level of consciousness: sleepy but conscious  Pain score: 0  Airway patency: patent  Nausea & Vomiting: no nausea  Cardiovascular status: hemodynamically stable  Respiratory status: acceptable  Hydration status: stable  Pain management: adequate    No notable events documented.

## 2024-03-12 NOTE — ANESTHESIA PRE PROCEDURE
Department of Anesthesiology  Preprocedure Note       Name:  Mo Brown   Age:  78 y.o.  :  1945                                          MRN:  [id number]         Date:  3/12/2024      Surgeon: Surgeon(s):  Suleman Emmanuel MD    Procedure: Procedure(s):  ABDOMINAL AORTIC ANEURYSM REPAIR EVAR    Medications prior to admission:   Prior to Admission medications    Medication Sig Start Date End Date Taking? Authorizing Provider   tetrahydrozoline 0.05 % ophthalmic solution Place 1 drop into both eyes 3 times daily as needed    ProviderRuslan MD   losartan (COZAAR) 100 MG tablet Take 1 tablet by mouth daily Indications: High Blood Pressure Disorder    ProviderRuslan MD   docusate sodium (COLACE) 100 MG capsule Take 1 capsule by mouth 2 times daily as needed for Constipation    ProviderRuslan MD   atenolol (TENORMIN) 25 MG tablet Take 1 tablet by mouth daily Indications: High Blood Pressure Disorder    Ruslan Berry MD   glipiZIDE (GLUCOTROL) 5 MG tablet Take 1 tablet by mouth daily Indications: Diabetes    ProviderRuslan MD   diphenhydrAMINE (BENADRYL) 25 MG tablet Take 1 tablet by mouth nightly as needed for Itching    Ruslan Berry MD   atorvastatin (LIPITOR) 20 MG tablet Take 1 tablet by mouth nightly Indications: High Amount of Fats in the Blood    ProviderRuslan MD   tamsulosin (FLOMAX) 0.4 MG capsule Take 1 capsule by mouth nightly Indications: Benign Enlargement of Prostate    ProviderRuslan MD       Current medications:    Current Facility-Administered Medications   Medication Dose Route Frequency Provider Last Rate Last Admin   • ceFAZolin (ANCEF) 1,000 mg in sterile water 10 mL IV syringe  1,000 mg IntraVENous Once Suleman Emmanuel MD       • lactated ringers IV soln infusion   IntraVENous Continuous Lawson Galvan  mL/hr at 24 0628 New Bag at 24 0628       Allergies:  No Known Allergies    Problem List:  There is no problem 
Date/Time    PROTIME 13.3 03/01/2024 02:55 PM    INR 1.03 03/01/2024 02:55 PM    APTT 30.1 03/01/2024 02:55 PM       HCG (If Applicable): No results found for: \"PREGTESTUR\", \"PREGSERUM\", \"HCG\", \"HCGQUANT\"     ABGs: No results found for: \"PHART\", \"PO2ART\", \"OXM2WWN\", \"EQO2OUM\", \"BEART\", \"K0YCPINT\"     Type & Screen (If Applicable):  No results found for: \"LABABO\", \"LABRH\"    Drug/Infectious Status (If Applicable):  No results found for: \"HIV\", \"HEPCAB\"    COVID-19 Screening (If Applicable): No results found for: \"COVID19\"        Anesthesia Evaluation    Airway:           Dental:          Pulmonary:                              Cardiovascular:    (+) hypertension:                  Neuro/Psych:   (+) CVA:            GI/Hepatic/Renal:             Endo/Other:    (+) Diabetes.                 Abdominal:             Vascular:   + PVD, aortic or cerebral (8cm infrarenal AAA).       Other Findings:             Anesthesia Plan      MAC     ASA 3       Induction: intravenous.  arterial line  MIPS: Postoperative opioids intended.  Anesthetic plan and risks discussed with patient.                        Dayron Parker MD   3/12/2024

## 2024-03-12 NOTE — PROGRESS NOTES
Difficulty completing assessment on pt this shift regarding mental status. Pt alert & maintains eye contact but refuses to speak. Family states he will only speak to  granddaughter at bedside. At time of arrival from PACU pt will not even respond to questions asked by granddaughter. Notable grimacing & withdrawing of R leg noted upon assessment attempt, prn IV pain medication given at this time. Will hold po meds until pt more alert & responsive.

## 2024-03-13 ENCOUNTER — READMISSION MANAGEMENT (OUTPATIENT)
Dept: CALL CENTER | Facility: HOSPITAL | Age: 79
End: 2024-03-13
Payer: MEDICARE

## 2024-03-13 VITALS
WEIGHT: 171 LBS | HEIGHT: 68 IN | TEMPERATURE: 97.2 F | OXYGEN SATURATION: 91 % | HEART RATE: 78 BPM | RESPIRATION RATE: 16 BRPM | DIASTOLIC BLOOD PRESSURE: 55 MMHG | BODY MASS INDEX: 25.91 KG/M2 | SYSTOLIC BLOOD PRESSURE: 117 MMHG

## 2024-03-13 LAB
ANION GAP SERPL CALCULATED.3IONS-SCNC: 11 MMOL/L (ref 7–19)
BUN SERPL-MCNC: 19 MG/DL (ref 8–23)
CALCIUM SERPL-MCNC: 9 MG/DL (ref 8.8–10.2)
CHLORIDE SERPL-SCNC: 102 MMOL/L (ref 98–111)
CO2 SERPL-SCNC: 24 MMOL/L (ref 22–29)
CREAT SERPL-MCNC: 1 MG/DL (ref 0.5–1.2)
ERYTHROCYTE [DISTWIDTH] IN BLOOD BY AUTOMATED COUNT: 13.1 % (ref 11.5–14.5)
GLUCOSE BLD-MCNC: 202 MG/DL (ref 70–99)
GLUCOSE SERPL-MCNC: 218 MG/DL (ref 74–109)
HBA1C MFR BLD: 8.8 % (ref 4–6)
HCT VFR BLD AUTO: 38.7 % (ref 42–52)
HGB BLD-MCNC: 12.3 G/DL (ref 14–18)
MCH RBC QN AUTO: 29.6 PG (ref 27–31)
MCHC RBC AUTO-ENTMCNC: 31.8 G/DL (ref 33–37)
MCV RBC AUTO: 93.3 FL (ref 80–94)
PERFORMED ON: ABNORMAL
PLATELET # BLD AUTO: 142 K/UL (ref 130–400)
PMV BLD AUTO: 11.9 FL (ref 9.4–12.4)
POTASSIUM SERPL-SCNC: 4.4 MMOL/L (ref 3.5–5)
RBC # BLD AUTO: 4.15 M/UL (ref 4.7–6.1)
SODIUM SERPL-SCNC: 137 MMOL/L (ref 136–145)
WBC # BLD AUTO: 8.5 K/UL (ref 4.8–10.8)

## 2024-03-13 PROCEDURE — 80048 BASIC METABOLIC PNL TOTAL CA: CPT

## 2024-03-13 PROCEDURE — 85027 COMPLETE CBC AUTOMATED: CPT

## 2024-03-13 PROCEDURE — 36415 COLL VENOUS BLD VENIPUNCTURE: CPT

## 2024-03-13 PROCEDURE — 6370000000 HC RX 637 (ALT 250 FOR IP): Performed by: SURGERY

## 2024-03-13 PROCEDURE — 82962 GLUCOSE BLOOD TEST: CPT

## 2024-03-13 PROCEDURE — 83036 HEMOGLOBIN GLYCOSYLATED A1C: CPT

## 2024-03-13 RX ADMIN — LOSARTAN POTASSIUM 100 MG: 100 TABLET, FILM COATED ORAL at 08:19

## 2024-03-13 RX ADMIN — GLIPIZIDE 5 MG: 5 TABLET ORAL at 08:19

## 2024-03-13 RX ADMIN — INSULIN LISPRO 2 UNITS: 100 INJECTION, SOLUTION INTRAVENOUS; SUBCUTANEOUS at 08:19

## 2024-03-13 NOTE — OUTREACH NOTE
Prep Survey      Flowsheet Row Responses   Restorationist facility patient discharged from? Non-BH   Is LACE score < 7 ? Non-BH Discharge   Eligibility [facility]   Date of Admission 03/12/24   Date of Discharge 03/13/24   Discharge Disposition Home or Self Care   Discharge diagnosis ABDOMINAL AORTIC ANEURYSM REPAIR EVAR   Does the patient have one of the following disease processes/diagnoses(primary or secondary)? General Surgery   Does the patient have Home health ordered? No   Prep survey completed? Yes            ANKUSH CORREIA - Registered Nurse

## 2024-03-13 NOTE — DISCHARGE INSTRUCTIONS
is not growing and your stent-graft stays in a good position.    10.  If you devleop any problems, or have any questions, please do not hesitate to call us at [phone].    11.  Things to call for include:   Severe abdominal or back pain   Diarrhea, especially if bloody    New or unusual drainage from the incision     Fever/chills     Sudden swelling at the incision sites   Sudden severe leg pain     Sudden swelling the feet and legs   Numbness or paralysis of the foot.      Tingling, coldness or discoloration of the leg

## 2024-03-13 NOTE — PLAN OF CARE
Problem: Discharge Planning  Goal: Discharge to home or other facility with appropriate resources  Outcome: Progressing  Flowsheets (Taken 3/12/2024 2326)  Discharge to home or other facility with appropriate resources: Identify barriers to discharge with patient and caregiver     Problem: Safety - Adult  Goal: Free from fall injury  Outcome: Progressing  Flowsheets (Taken 3/13/2024 0249)  Free From Fall Injury: Instruct family/caregiver on patient safety     Problem: Skin/Tissue Integrity  Goal: Absence of new skin breakdown  Description: 1.  Monitor for areas of redness and/or skin breakdown  2.  Assess vascular access sites hourly  3.  Every 4-6 hours minimum:  Change oxygen saturation probe site  4.  Every 4-6 hours:  If on nasal continuous positive airway pressure, respiratory therapy assess nares and determine need for appliance change or resting period.  Outcome: Progressing     Problem: ABCDS Injury Assessment  Goal: Absence of physical injury  Outcome: Progressing  Flowsheets (Taken 3/13/2024 0249)  Absence of Physical Injury: Implement safety measures based on patient assessment     Problem: Chronic Conditions and Co-morbidities  Goal: Patient's chronic conditions and co-morbidity symptoms are monitored and maintained or improved  Outcome: Progressing  Flowsheets (Taken 3/12/2024 2326)  Care Plan - Patient's Chronic Conditions and Co-Morbidity Symptoms are Monitored and Maintained or Improved: Monitor and assess patient's chronic conditions and comorbid symptoms for stability, deterioration, or improvement     Problem: Pain  Goal: Verbalizes/displays adequate comfort level or baseline comfort level  Outcome: Progressing     
RN  Outcome: Adequate for Discharge  Flowsheets (Taken 3/13/2024 1037)  Care Plan - Patient's Chronic Conditions and Co-Morbidity Symptoms are Monitored and Maintained or Improved: Monitor and assess patient's chronic conditions and comorbid symptoms for stability, deterioration, or improvement  3/13/2024 0252 by Kierra Diaz LPN  Outcome: Progressing  Flowsheets (Taken 3/12/2024 2326)  Care Plan - Patient's Chronic Conditions and Co-Morbidity Symptoms are Monitored and Maintained or Improved: Monitor and assess patient's chronic conditions and comorbid symptoms for stability, deterioration, or improvement     Problem: Pain  Goal: Verbalizes/displays adequate comfort level or baseline comfort level  3/13/2024 1039 by Franco Suh RN  Outcome: Adequate for Discharge  3/13/2024 0252 by Kierra Diaz LPN  Outcome: Progressing

## 2024-03-13 NOTE — DISCHARGE SUMMARY
Approximately 5 minutes of pressure was applied in each groin.  Hemostasis was excellent.  The wounds were closed with 4-0 Monocryl suture.  Sterile dressings were placed.  The patient tolerated the procedure well and was extubated in the operating room and brought to the recovery room in good condition.                     History and Physical:    See History and Physical on admission without additions nor deletions.    Hospital Course:    He was admitted after the above stated procedure (please see operative note for details of procedure).  He was admitted to the med/surg floor.      On POD#1, his diet and activity were increased.    On the date of discharge, his vitals are noted on Epic.  His abdomen is soft, non-tender, nondistended, and bilateral groin wounds are C/D/I without hematomas.  His feet are warm without any signs of distal embolization.     Discharge Instructions:    Discharge instructions were discussed with the patient prior to discharge.  The patient was also given written discharge instructions.    Discharge Medications:    See Epic for discharge medication list, including any new prescriptions.    Discharge Disposition: Good to home    Follow-Up:    He will follow-up with our office in 2-3 weeks.  He can call with any questions or concerns.     Signed:  SEAN GREENE MD  3/13/2024  7:45 AM

## 2024-03-13 NOTE — OUTREACH NOTE
Prep Survey      Flowsheet Row Responses   [facility] facility patient discharged from? Non-BH   Is LACE score < 7 ? Non-BH Discharge   Eligibility [facility]   Date of Admission 03/12/24   Date of Discharge 03/13/24   Discharge diagnosis ABDOMINAL AORTIC ANEURYSM REPAIR   Does the patient have one of the following disease processes/diagnoses(primary or secondary)? General Surgery   Prep survey completed? Yes            Gogo JOHNSON - Registered Nurse

## 2024-03-14 ENCOUNTER — TRANSITIONAL CARE MANAGEMENT TELEPHONE ENCOUNTER (OUTPATIENT)
Dept: CALL CENTER | Facility: HOSPITAL | Age: 79
End: 2024-03-14
Payer: MEDICARE

## 2024-03-14 NOTE — OUTREACH NOTE
Call Center TCM Note      Flowsheet Row Responses   [facility] patient discharged from? Non-BH   Does the patient have one of the following disease processes/diagnoses(primary or secondary)? Other   TCM attempt successful? No   Unsuccessful attempts Attempt 1            Brisa Montiel LPN    3/14/2024, 10:35 CDT

## 2024-03-14 NOTE — OUTREACH NOTE
Call Center TCM Note      Flowsheet Row Responses   [facility] patient discharged from? Non-   Does the patient have one of the following disease processes/diagnoses(primary or secondary)? Other   TCM attempt successful? Yes   Call start time 1347   Call end time 1352   Discharge diagnosis ABDOMINAL AORTIC ANEURYSM REPAIR EVAR   Meds reviewed with patient/caregiver? Yes   Is the patient taking all medications as directed (includes completed medication regime)? Yes   Does the patient have an appointment with their PCP within 7-14 days of discharge? No   Nursing Interventions Assisted patient with making appointment per protocol   Has home health visited the patient within 72 hours of discharge? N/A   Psychosocial issues? No   Did the patient receive a copy of their discharge instructions? Yes   Nursing interventions Reviewed instructions with patient   What is the patient's perception of their health status since discharge? Improving   Is the patient/caregiver able to teach back signs and symptoms related to disease process for when to call PCP? Yes   Is the patient/caregiver able to teach back signs and symptoms related to disease process for when to call 911? Yes   Is the patient/caregiver able to teach back the hierarchy of who to call/visit for symptoms/problems? PCP, Specialist, Home health nurse, Urgent Care, ED, 911 Yes   If the patient is a current smoker, are they able to teach back resources for cessation? Not a smoker   TCM call completed? Yes   Call end time 1352   Would this patient benefit from a Referral to Amb Social Work? No   Is the patient interested in additional calls from an ambulatory ? No            Brisa Montiel LPN    3/14/2024, 13:55 CDT

## 2024-03-20 ENCOUNTER — OFFICE VISIT (OUTPATIENT)
Dept: INTERNAL MEDICINE | Facility: CLINIC | Age: 79
End: 2024-03-20
Payer: MEDICARE

## 2024-03-20 VITALS
DIASTOLIC BLOOD PRESSURE: 52 MMHG | BODY MASS INDEX: 25.62 KG/M2 | OXYGEN SATURATION: 99 % | HEART RATE: 55 BPM | SYSTOLIC BLOOD PRESSURE: 118 MMHG | HEIGHT: 69 IN | WEIGHT: 173 LBS | TEMPERATURE: 97.1 F

## 2024-03-20 DIAGNOSIS — J30.2 SEASONAL ALLERGIES: ICD-10-CM

## 2024-03-20 DIAGNOSIS — F02.A0 MILD LATE ONSET ALZHEIMER'S DEMENTIA WITHOUT BEHAVIORAL DISTURBANCE, PSYCHOTIC DISTURBANCE, MOOD DISTURBANCE, OR ANXIETY: ICD-10-CM

## 2024-03-20 DIAGNOSIS — E11.9 TYPE 2 DIABETES MELLITUS WITHOUT COMPLICATION, WITHOUT LONG-TERM CURRENT USE OF INSULIN: ICD-10-CM

## 2024-03-20 DIAGNOSIS — I71.43 INFRARENAL ABDOMINAL AORTIC ANEURYSM (AAA) WITHOUT RUPTURE: Primary | ICD-10-CM

## 2024-03-20 DIAGNOSIS — I10 BENIGN ESSENTIAL HTN: ICD-10-CM

## 2024-03-20 DIAGNOSIS — E78.5 HYPERLIPIDEMIA LDL GOAL <70: ICD-10-CM

## 2024-03-20 DIAGNOSIS — Z98.890 H/O ABDOMINAL AORTIC ANEURYSM REPAIR: ICD-10-CM

## 2024-03-20 DIAGNOSIS — G30.1 MILD LATE ONSET ALZHEIMER'S DEMENTIA WITHOUT BEHAVIORAL DISTURBANCE, PSYCHOTIC DISTURBANCE, MOOD DISTURBANCE, OR ANXIETY: ICD-10-CM

## 2024-03-20 RX ORDER — GLIPIZIDE 5 MG/1
5 TABLET ORAL
Qty: 180 TABLET | Refills: 1 | Status: SHIPPED | OUTPATIENT
Start: 2024-03-20

## 2024-03-20 RX ORDER — OLOPATADINE HYDROCHLORIDE 2 MG/ML
1 SOLUTION/ DROPS OPHTHALMIC DAILY
Qty: 2.5 ML | Refills: 1 | Status: SHIPPED | OUTPATIENT
Start: 2024-03-20

## 2024-03-20 RX ORDER — FLUTICASONE PROPIONATE 50 MCG
2 SPRAY, SUSPENSION (ML) NASAL DAILY
Qty: 16 G | Refills: 1 | Status: SHIPPED | OUTPATIENT
Start: 2024-03-20

## 2024-03-20 RX ORDER — ATENOLOL 25 MG/1
25 TABLET ORAL 2 TIMES DAILY
COMMUNITY

## 2024-03-20 RX ORDER — ASPIRIN 81 MG/1
81 TABLET, CHEWABLE ORAL ONCE
Start: 2024-03-20 | End: 2024-03-20

## 2024-03-20 NOTE — PROGRESS NOTES
CC: Hospital follow-up for AAA endograft.     History:  Bert Pereira is a 78 y.o. male who presents today for transitional care management after hospitalization.    Date of Discharge: 3/13/2024 ([facility]).  TCM call successfully made on 3/14/2024 by Brisa Montiel LPN.    ROS:  Review of Systems    Mr. Pereira  reports that he quit smoking about 41 years ago. His smoking use included cigarettes. He has never used smokeless tobacco. He reports that he does not drink alcohol and does not use drugs.      Current Outpatient Medications:     aspirin 81 MG chewable tablet, Chew 1 tablet 1 (One) Time for 1 dose. 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 Indications: Inherited Heterozygous Hypercholesterolemia, Disp: , Rfl:     bisacodyl (DULCOLAX) 10 MG suppository, Insert 1 suppository into the rectum Daily As Needed for Constipation. Indications: Constipation, Disp: , Rfl:     cetirizine (zyrTEC) 5 MG tablet, Take 1 tablet by mouth Every Night. Indications: Allergic Conjunctivitis, Disp: , Rfl:     diphenhydrAMINE (Benadryl Allergy) 25 MG tablet, Take 2 tablets by mouth At Night As Needed for Sleep. Indications: Trouble Sleeping, Disp: , Rfl:     docusate sodium (COLACE) 100 MG capsule, Take 1 capsule by mouth As Needed for Constipation. Indications: Constipation, Disp: , Rfl:     glipizide (GLUCOTROL) 5 MG tablet, Take 1 tablet by mouth 2 (Two) Times a Day Before Meals., Disp: 180 tablet, Rfl: 1    losartan (COZAAR) 100 MG tablet, Take 1 tablet by mouth Daily. Indications: High Blood Pressure Disorder, Disp: 90 tablet, Rfl: 3    tamsulosin (FLOMAX) 0.4 MG capsule 24 hr capsule, Take 1 capsule by mouth Every Night., Disp: 90 capsule, Rfl: 1    fluticasone (FLONASE) 50 MCG/ACT nasal spray, 2 sprays into the nostril(s) as directed by  "provider Daily., Disp: 16 g, Rfl: 1    olopatadine (PATADAY) 0.2 % solution ophthalmic solution, Administer 1 drop to both eyes Daily., Disp: 2.5 mL, Rfl: 1      OBJECTIVE:  /52 (BP Location: Left arm, Patient Position: Sitting, Cuff Size: Adult)   Pulse 55   Temp 97.1 °F (36.2 °C) (Infrared)   Ht 175.3 cm (69\")   Wt 78.5 kg (173 lb)   SpO2 99%   BMI 25.55 kg/m²        Physical Exam  Constitutional:       Comments: Seen and discussed with his granddaughter.   HENT:      Head: Normocephalic and atraumatic.   Eyes:      Conjunctiva/sclera: Conjunctivae normal.      Pupils: Pupils are equal, round, and reactive to light.   Cardiovascular:      Rate and Rhythm: Normal rate and regular rhythm.      Heart sounds: Normal heart sounds.   Pulmonary:      Effort: Pulmonary effort is normal. No respiratory distress.      Breath sounds: Normal breath sounds.   Abdominal:      General: Bowel sounds are normal. There is no distension.      Palpations: Abdomen is soft.      Tenderness: There is no abdominal tenderness.   Musculoskeletal:         General: No swelling.      Cervical back: Neck supple.      Comments: 2 tiny incisions in each groin without bruising.  Appears to be anchored with 1 dissolvable stitch each.   Skin:     General: Skin is warm and dry.      Findings: No rash.   Neurological:      General: No focal deficit present.      Mental Status: He is alert.      Motor: Weakness (walks with a walker) present.   Psychiatric:      Comments: Flat affect.  Typical for him.       Assessment/Plan    Diagnoses and all orders for this visit:    1. Infrarenal abdominal aortic aneurysm (AAA) without rupture (Primary)    2. H/O abdominal aortic aneurysm repair    3. Type 2 diabetes mellitus without complication, without long-term current use of insulin  -     glipizide (GLUCOTROL) 5 MG tablet; Take 1 tablet by mouth 2 (Two) Times a Day Before Meals.  Dispense: 180 tablet; Refill: 1    4. Benign essential HTN    5. " Hyperlipidemia LDL goal <70    6. Mild late onset Alzheimer's dementia without behavioral disturbance, psychotic disturbance, mood disturbance, or anxiety    7. Seasonal allergies  -     fluticasone (FLONASE) 50 MCG/ACT nasal spray; 2 sprays into the nostril(s) as directed by provider Daily.  Dispense: 16 g; Refill: 1  -     olopatadine (PATADAY) 0.2 % solution ophthalmic solution; Administer 1 drop to both eyes Daily.  Dispense: 2.5 mL; Refill: 1    Other orders  -     aspirin 81 MG chewable tablet; Chew 1 tablet 1 (One) Time for 1 dose. 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    On 3/12, he underwent endograft repair of an infrarenal abdominal aortic aneurysm greater than 8 cm in diameter.  This was done by Dr. Lucas at [facility]. He can resume ASA.     Hemoglobin A1c is up a bit from his visit in February.  He has not tolerated metformin in the past.  He has done well with glipizide without any hypoglycemic episodes.  Plan to have him take this twice daily for now instead of once daily.  His renal function has been good recently as well.    Blood pressure at goal.  118/52.  Continue losartan and atenolol.    Continue atorvastatin.  His lipids were excellent in September.  Plan to check again at the year [facility]red.    He has not been taking memantine for a while.  He has also been off Seroquel.  There have not been any behavioral disturbances recently.    He has been having quite a bit of issue with seasonal allergies over the last week.  Itchy, watery eyes.  Postnasal drip, sinus congestion.  Already taking Zyrtec.  Add Flonase.  Add Pataday.    He already has an appointment to see me on 5/10.  Plan to move that back to the 3-month mildred for a June appointment and then have him see me for his yearly appointment in September at which time we will do annual Medicare wellness.    Result Review :  Labs from his recent hospitalization  1.  CBC showed white blood count 8.5.  H&H 12.3  and 38.7 respectively.  Platelets 142,000.  2.  BMP showed a sodium of 137, potassium 4.4.  Chloride 102 and bicarb 24.  BUN and creatinine 19 and 1.0 respectively.  Calcium 9.0 and glucose 218.  3.  Hemoglobin A1c 8.8.  It was 8.0 on 2/16.    Chest x-ray on 3/4 showed no acute radiographic abnormality.    An After Visit Summary was printed and given to the patient at discharge.  Return in about 8 weeks (around 5/15/2024) for Recheck; wants to schedule the same day as wife rescheduling. Sooner if problems arise.         LIANE Turner DO  Electronically signed by ZENA Turner DO, 03/20/24, 3:19 PM CDT.

## 2024-03-26 RX ORDER — ATENOLOL 25 MG/1
25 TABLET ORAL 2 TIMES DAILY
Qty: 90 TABLET | Refills: 3 | Status: SHIPPED | OUTPATIENT
Start: 2024-03-26

## 2024-03-26 NOTE — TELEPHONE ENCOUNTER
Caller: donny gifford    Relationship: Emergency Contact    Best call back number: [phone]     Requested Prescriptions:   Requested Prescriptions     Pending Prescriptions Disp Refills    atenolol (TENORMIN) 25 MG tablet       Sig: Take 1 tablet by mouth 2 (Two) Times a Day.        Pharmacy where request should be sent: [facility] [address] [place] [address] [phone] PH - [phone]      Last office visit with prescribing clinician: 3/20/2024   Last telemedicine visit with prescribing clinician: Visit date not found   Next office visit with prescribing clinician: 5/15/2024     Additional details provided by patient: PATIENT'S WIFE STATE'S PATIENT HAS 3 DAYS'S LEFT OF MEDICATION.    Does the patient have less than a 3 day supply:  [x] Yes  [] No    Would you like a call back once the refill request has been completed: [] Yes [x] No    If the office needs to give you a call back, can they leave a voicemail: [] Yes [x] No    Mallory Mata Rep   03/26/24 14:35 CDT

## 2024-05-10 DIAGNOSIS — E11.9 TYPE 2 DIABETES MELLITUS WITHOUT COMPLICATION, WITHOUT LONG-TERM CURRENT USE OF INSULIN: ICD-10-CM

## 2024-05-10 RX ORDER — ATORVASTATIN CALCIUM 20 MG/1
20 TABLET, FILM COATED ORAL DAILY
Qty: 90 TABLET | Refills: 3 | Status: SHIPPED | OUTPATIENT
Start: 2024-05-10

## 2024-05-10 RX ORDER — GLIPIZIDE 5 MG/1
5 TABLET ORAL
Qty: 180 TABLET | Refills: 3 | Status: SHIPPED | OUTPATIENT
Start: 2024-05-10

## 2024-05-10 RX ORDER — ATENOLOL 25 MG/1
25 TABLET ORAL 2 TIMES DAILY
Qty: 180 TABLET | Refills: 3 | Status: SHIPPED | OUTPATIENT
Start: 2024-05-10

## 2024-05-17 ENCOUNTER — OFFICE VISIT (OUTPATIENT)
Dept: INTERNAL MEDICINE | Facility: CLINIC | Age: 79
End: 2024-05-17
Payer: MEDICARE

## 2024-05-17 VITALS
DIASTOLIC BLOOD PRESSURE: 66 MMHG | TEMPERATURE: 97.8 F | HEIGHT: 69 IN | SYSTOLIC BLOOD PRESSURE: 124 MMHG | RESPIRATION RATE: 18 BRPM | HEART RATE: 66 BPM | WEIGHT: 165 LBS | OXYGEN SATURATION: 99 % | BODY MASS INDEX: 24.44 KG/M2

## 2024-05-17 DIAGNOSIS — E11.9 TYPE 2 DIABETES MELLITUS WITHOUT COMPLICATION, WITHOUT LONG-TERM CURRENT USE OF INSULIN: ICD-10-CM

## 2024-05-17 DIAGNOSIS — I10 BENIGN ESSENTIAL HTN: ICD-10-CM

## 2024-05-17 DIAGNOSIS — I71.43 INFRARENAL ABDOMINAL AORTIC ANEURYSM (AAA) WITHOUT RUPTURE: ICD-10-CM

## 2024-05-17 DIAGNOSIS — E78.5 HYPERLIPIDEMIA LDL GOAL <70: ICD-10-CM

## 2024-05-17 DIAGNOSIS — Z98.890 STATUS POST ENDOVASCULAR ANEURYSM REPAIR: ICD-10-CM

## 2024-05-17 DIAGNOSIS — G30.1 MILD LATE ONSET ALZHEIMER'S DEMENTIA WITHOUT BEHAVIORAL DISTURBANCE, PSYCHOTIC DISTURBANCE, MOOD DISTURBANCE, OR ANXIETY: ICD-10-CM

## 2024-05-17 DIAGNOSIS — Z86.79 HISTORY OF INTRACRANIAL HEMORRHAGE: ICD-10-CM

## 2024-05-17 DIAGNOSIS — F02.A0 MILD LATE ONSET ALZHEIMER'S DEMENTIA WITHOUT BEHAVIORAL DISTURBANCE, PSYCHOTIC DISTURBANCE, MOOD DISTURBANCE, OR ANXIETY: ICD-10-CM

## 2024-05-17 DIAGNOSIS — Z86.79 STATUS POST ENDOVASCULAR ANEURYSM REPAIR: ICD-10-CM

## 2024-05-17 DIAGNOSIS — Z00.00 ENCOUNTER FOR SUBSEQUENT ANNUAL WELLNESS VISIT IN MEDICARE PATIENT: Primary | ICD-10-CM

## 2024-05-17 NOTE — PROGRESS NOTES
The ABCs of the Annual Wellness Visit  Subsequent Medicare Wellness Visit    Chief Complaint   Patient presents with    Medicare Wellness-subsequent      Subjective    History of Present Illness:  Bert Pereira is a 78 y.o. male who presents for a Subsequent Medicare Wellness Visit.    The following portions of the patient's history were reviewed and   updated as appropriate: allergies, current medications, past family history, past medical history, past social history, past surgical history and problem list.    Compared to one year ago, the patient feels his physical   health is worse  because of the hemorrhagic stroke; better the last 6 months.     Compared to one year ago, the patient feels his mental   health is the same.    Recent Hospitalizations:  This patient has had a [facility] admission record on file within the last 365 days.    Current Medical Providers:  Patient Care Team:  ZENA Turner DO as PCP - General (Internal Medicine)  Bob Andrews APRN as Nurse Practitioner (Nurse Practitioner)    Outpatient Medications Prior to Visit   Medication Sig Dispense Refill    atenolol (TENORMIN) 25 MG tablet Take 1 tablet by mouth 2 (Two) Times a Day. 180 tablet 3    atorvastatin (LIPITOR) 20 MG tablet Take 1 tablet by mouth Daily. Indications: Inherited Heterozygous Hypercholesterolemia 90 tablet 3    bisacodyl (DULCOLAX) 10 MG suppository Insert 1 suppository into the rectum Daily As Needed for Constipation. Indications: Constipation      cetirizine (zyrTEC) 5 MG tablet Take 1 tablet by mouth Every Night. Indications: Allergic Conjunctivitis      diphenhydrAMINE (Benadryl Allergy) 25 MG tablet Take 2 tablets by mouth At Night As Needed for Sleep. Indications: Trouble Sleeping      docusate sodium (COLACE) 100 MG capsule Take 1 capsule by mouth As Needed for Constipation. Indications: Constipation      glipizide (GLUCOTROL) 5 MG tablet Take 1 tablet by mouth 2 (Two) Times a Day Before  Meals. 180 tablet 3    losartan (COZAAR) 100 MG tablet Take 1 tablet by mouth Daily. Indications: High Blood Pressure Disorder 90 tablet 3    olopatadine (PATADAY) 0.2 % solution ophthalmic solution Administer 1 drop to both eyes Daily. (Patient taking differently: Administer 1 drop to both eyes Daily As Needed.) 2.5 mL 1    fluticasone (FLONASE) 50 MCG/ACT nasal spray 2 sprays into the nostril(s) as directed by provider Daily. (Patient not taking: Reported on 5/17/2024) 16 g 1    tamsulosin (FLOMAX) 0.4 MG capsule 24 hr capsule Take 1 capsule by mouth Every Night. 90 capsule 1     No facility-administered medications prior to visit.       No opioid medication identified on active medication list. I have reviewed chart for other potential  high risk medication/s and harmful drug interactions in the elderly.        Aspirin is not on active medication list.  Aspirin use is indicated based on review of current medical condition/s. Pros and cons of this therapy have been discussed with this patient. Benefits of this medication outweigh potential harm.  Patient has been instructed to start taking this medication..    Patient Active Problem List   Diagnosis    AMS (altered mental status)    COVID-19 virus detected    Dementia    Hyperlipidemia LDL goal <70    History of intracranial hemorrhage    Benign essential HTN    Type 2 diabetes mellitus without complication, without long-term current use of insulin    Metabolic encephalopathy    Intraventricular hemorrhage    Left-sided nontraumatic intracerebral hemorrhage    Body mass index (BMI) of 24.0-24.9 in adult    Nonsmoker     Advance Care Planning  Advance Directive is not on file.  ACP discussion was held with the patient during this visit. Patient does not have an advance directive, information provided.       Objective    Vitals:    05/17/24 1409   BP: 124/66   BP Location: Left arm   Patient Position: Sitting   Cuff Size: Adult   Pulse: 66   Resp: 18   Temp: 97.8 °F  "(36.6 °C)   TempSrc: Infrared   SpO2: 99%   Weight: 74.8 kg (165 lb)   Height: 175.3 cm (69\")   PainSc: 0-No pain     Estimated body mass index is 24.37 kg/m² as calculated from the following:    Height as of this encounter: 175.3 cm (69\").    Weight as of this encounter: 74.8 kg (165 lb).    BMI is within normal parameters. No other follow-up for BMI required.      Does the patient have evidence of cognitive impairment? Yes      Lab Results   Component Value Date    HGBA1C 8.8 (H) 2024            HEALTH RISK ASSESSMENT    Smoking Status:  Social History     Tobacco Use   Smoking Status Former    Current packs/day: 0.00    Types: Cigarettes    Quit date:     Years since quittin.4   Smokeless Tobacco Never     Alcohol Consumption:  Social History     Substance and Sexual Activity   Alcohol Use Never     Fall Risk Screen:    SHWETHA Fall Risk Assessment was completed, and patient is at HIGH risk for falls. Assessment completed on:3/20/2024    Depression Screening:      3/20/2024     3:29 PM   PHQ-2/PHQ-9 Depression Screening   Little Interest or Pleasure in Doing Things 0-->not at all   Feeling Down, Depressed or Hopeless 0-->not at all   PHQ-9: Brief Depression Severity Measure Score 0       Health Habits and Functional and Cognitive Screenin/17/2024     2:15 PM   Functional & Cognitive Status   Do you have difficulty preparing food and eating? No   Do you have difficulty bathing yourself, getting dressed or grooming yourself? No   Do you have difficulty using the toilet? Yes   Do you have difficulty moving around from place to place? Yes   Do you have trouble with steps or getting out of a bed or a chair? Yes   Current Diet Well Balanced Diet   Dental Exam Up to date   Eye Exam Up to date   Exercise (times per week) 7 times per week   Current Exercises Include Walking   Do you need help using the phone?  Yes   Are you deaf or do you have serious difficulty hearing?  No   Do you need help to go " to places out of walking distance? Yes   Do you need help shopping? No   Do you need help preparing meals?  No   Do you need help with housework?  No   Do you need help with laundry? No   Do you need help taking your medications? Yes   Do you need help managing money? No   Do you ever drive or ride in a car without wearing a seat belt? Yes   Have you felt unusual stress, anger or loneliness in the last month? No   Who do you live with? Child   If you need help, do you have trouble finding someone available to you? No   Have you been bothered in the last four weeks by sexual problems? No   Do you have difficulty concentrating, remembering or making decisions? Yes       Age-appropriate Screening Schedule:  Refer to the list below for future screening recommendations based on patient's age, sex and/or medical conditions. Orders for these recommended tests are listed in the plan section. The patient has been provided with a written plan.    Health Maintenance   Topic Date Due    Pneumococcal Vaccine 65+ (1 of 2 - PCV) Never done    DIABETIC EYE EXAM  Never done    TDAP/TD VACCINES (1 - Tdap) Never done    ZOSTER VACCINE (1 of 2) Never done    RSV Vaccine - Adults (1 - 1-dose 60+ series) Never done    COVID-19 Vaccine (1 - 2023-24 season) Never done    INFLUENZA VACCINE  08/01/2024    LIPID PANEL  09/01/2024    HEMOGLOBIN A1C  09/13/2024    URINE MICROALBUMIN  02/16/2025    ANNUAL WELLNESS VISIT  05/17/2025              Assessment & Plan   CMS Preventative Services Quick Reference  Risk Factors Identified During Encounter  Immunizations Discussed/Encouraged: Pneumococcal 23  Dental Screening Recommended  Vision Screening Recommended  The above risks/problems have been discussed with the patient.  Follow up actions/plans if indicated are seen below in the Assessment/Plan Section.  Pertinent information has been shared with the patient in the After Visit Summary.    Diagnoses and all orders for this visit:    1. Encounter  "for subsequent annual wellness visit in Medicare patient (Primary)    2. Type 2 diabetes mellitus without complication, without long-term current use of insulin    3. Benign essential HTN    4. Hyperlipidemia LDL goal <70    5. History of intracranial hemorrhage    6. Infrarenal abdominal aortic aneurysm (AAA) without rupture    7. Status post endovascular aneurysm repair    8. Mild late onset Alzheimer's dementia without behavioral disturbance, psychotic disturbance, mood disturbance, or anxiety    See below.    Result Review :  See below.    Follow Up:   Return in about 4 months (around 9/17/2024) for Recheck.     An After Visit Summary and PPPS were made available to the patient.                         LIANE Turner DO       Electronically signed by ZENA Turner DO, 05/17/24, 2:21 PM CDT.    Additional E&M Note during same encounter follows:  Patient has multiple medical problems which are significant and separately identifiable that require additional work above and beyond the Medicare Wellness Visit.      Chief Complaint  Medicare Wellness-subsequent    Subjective        HPI  Bert Pereira is also being seen today for diabetes, hypertension, Alzheimer's dementia.    Objective   Vitals:    05/17/24 1409   BP: 124/66   BP Location: Left arm   Patient Position: Sitting   Cuff Size: Adult   Pulse: 66   Resp: 18   Temp: 97.8 °F (36.6 °C)   TempSrc: Infrared   SpO2: 99%   Weight: 74.8 kg (165 lb)   Height: 175.3 cm (69\")   PainSc: 0-No pain     Physical Exam  Constitutional:       Comments: Seen and discussed with his wife, daughter, and granddaughter.   HENT:      Head: Normocephalic and atraumatic.   Eyes:      Conjunctiva/sclera: Conjunctivae normal.      Pupils: Pupils are equal, round, and reactive to light.   Cardiovascular:      Rate and Rhythm: Normal rate and regular rhythm.      Heart sounds: Normal heart sounds.   Pulmonary:      Effort: Pulmonary effort is normal. No respiratory " distress.      Breath sounds: Normal breath sounds.   Musculoskeletal:         General: No swelling.      Cervical back: Neck supple.   Skin:     General: Skin is warm and dry.      Findings: No rash.   Neurological:      General: No focal deficit present.      Mental Status: He is alert. Mental status is at baseline. He is disoriented.      Motor: Weakness (generalized) present.   Psychiatric:      Comments: Flat affect.     Nothing new to review.    He will be due for updated labs in September.         Assessment and Plan   Diagnoses and all orders for this visit:    1. Encounter for subsequent annual wellness visit in Medicare patient (Primary)    2. Type 2 diabetes mellitus without complication, without long-term current use of insulin    3. Benign essential HTN    4. Hyperlipidemia LDL goal <70    5. History of intracranial hemorrhage    6. Infrarenal abdominal aortic aneurysm (AAA) without rupture    7. Status post endovascular aneurysm repair    8. Mild late onset Alzheimer's dementia without behavioral disturbance, psychotic disturbance, mood disturbance, or anxiety    Presents today for subsequent Medicare wellness visit.    Hemoglobin A1c was 8.8 in March.  He is not quite yet due to have it repeated.  He also did not want to have it repeated today.  His hemoglobin A1c was 8.0 in February.  He is on glipizide.  No hypoglycemic events.  Again explained to the patient and his family that his hemoglobin A1c goal would be a bit higher given his age and other medical comorbidities.  They expressed understanding.  No significant protein in the urine in February.    Blood pressure well-controlled at 124/66.  Continue atenolol and losartan.    Continue atorvastatin.  Next lipids due in September.    History of intracranial hemorrhage.  Not presently on antiplatelet therapy.    He underwent endovascular stent graft of an abdominal aortic aneurysm with Dr. Lucas in March.  He did very well with this.    His family  had him off Namenda and Seroquel previously.  He has not had any worsening of his dementia or agitation.    Return in 4 months for reevaluation or sooner if problems.  His wife will also likely schedule her appointment at the same time.         Follow Up   Return in about 4 months (around 9/17/2024) for Recheck.  Patient was given instructions and counseling regarding his condition or for health maintenance advice. Please see specific information pulled into the AVS if appropriate.       LIANE Turner DO     Electronically signed by ZENA Turner DO, 05/17/24, 2:21 PM CDT.

## 2024-06-25 NOTE — TELEPHONE ENCOUNTER
Caller: donny gifford    Relationship: Emergency Contact    Best call back number: [phone]     What orders are you requesting (i.e. lab or imaging): AN ORDER FOR IN HOME NURSE AID. INSURANCE (MEDICARE) STATED AN ORDER WOULD BE REQUIRED    In what timeframe would the patient need to come in: ASAP    Where will you receive your lab/imaging services: IN HOME FROM COMPANY:  [facility].     CALL DONNY ONCE ORDER HAS BEEN DONE OR IF AN APPT IS NEEDED.

## 2024-06-27 NOTE — TELEPHONE ENCOUNTER
Long discussion with Carmen - she had requested the agency listed as home health had told her in the past that they didn't have any aides to send and this company was the only agency that had aide service.  She mentions that his ambulation has decreased and would benefit from PT/OT and eating has declined/changed, so maybe ST as well.  She says he is now incotinent of bowel and bladder.  She is willing to try the home health route for skilled services, and hopefully they will have aides now to send as well.  Advised that he has to be seen within 30 days of the referral and he is outside of that window, so will schedule OV w/Dr. Turner.

## 2024-07-03 ENCOUNTER — OFFICE VISIT (OUTPATIENT)
Dept: INTERNAL MEDICINE | Facility: CLINIC | Age: 79
End: 2024-07-03
Payer: MEDICARE

## 2024-07-03 VITALS
DIASTOLIC BLOOD PRESSURE: 70 MMHG | HEART RATE: 93 BPM | SYSTOLIC BLOOD PRESSURE: 118 MMHG | TEMPERATURE: 97.2 F | WEIGHT: 165 LBS | BODY MASS INDEX: 24.44 KG/M2 | HEIGHT: 69 IN

## 2024-07-03 DIAGNOSIS — G30.1 MILD LATE ONSET ALZHEIMER'S DEMENTIA WITHOUT BEHAVIORAL DISTURBANCE, PSYCHOTIC DISTURBANCE, MOOD DISTURBANCE, OR ANXIETY: ICD-10-CM

## 2024-07-03 DIAGNOSIS — F02.A0 MILD LATE ONSET ALZHEIMER'S DEMENTIA WITHOUT BEHAVIORAL DISTURBANCE, PSYCHOTIC DISTURBANCE, MOOD DISTURBANCE, OR ANXIETY: ICD-10-CM

## 2024-07-03 DIAGNOSIS — E78.5 HYPERLIPIDEMIA LDL GOAL <70: ICD-10-CM

## 2024-07-03 DIAGNOSIS — Z86.79 HISTORY OF INTRACRANIAL HEMORRHAGE: ICD-10-CM

## 2024-07-03 DIAGNOSIS — Z74.09 IMPAIRED MOBILITY AND ADLS: ICD-10-CM

## 2024-07-03 DIAGNOSIS — I10 BENIGN ESSENTIAL HTN: ICD-10-CM

## 2024-07-03 DIAGNOSIS — Z78.9 IMPAIRED MOBILITY AND ADLS: ICD-10-CM

## 2024-07-03 DIAGNOSIS — E11.9 TYPE 2 DIABETES MELLITUS WITHOUT COMPLICATION, WITHOUT LONG-TERM CURRENT USE OF INSULIN: Primary | ICD-10-CM

## 2024-07-03 LAB
EXPIRATION DATE: ABNORMAL
HBA1C MFR BLD: 8 % (ref 4.5–5.7)
Lab: ABNORMAL

## 2024-07-03 PROCEDURE — G2211 COMPLEX E/M VISIT ADD ON: HCPCS | Performed by: INTERNAL MEDICINE

## 2024-07-03 PROCEDURE — 99214 OFFICE O/P EST MOD 30 MIN: CPT | Performed by: INTERNAL MEDICINE

## 2024-07-03 PROCEDURE — 1126F AMNT PAIN NOTED NONE PRSNT: CPT | Performed by: INTERNAL MEDICINE

## 2024-07-03 PROCEDURE — 3074F SYST BP LT 130 MM HG: CPT | Performed by: INTERNAL MEDICINE

## 2024-07-03 PROCEDURE — 1160F RVW MEDS BY RX/DR IN RCRD: CPT | Performed by: INTERNAL MEDICINE

## 2024-07-03 PROCEDURE — 1159F MED LIST DOCD IN RCRD: CPT | Performed by: INTERNAL MEDICINE

## 2024-07-03 PROCEDURE — 3052F HG A1C>EQUAL 8.0%<EQUAL 9.0%: CPT | Performed by: INTERNAL MEDICINE

## 2024-07-03 PROCEDURE — 3078F DIAST BP <80 MM HG: CPT | Performed by: INTERNAL MEDICINE

## 2024-07-03 PROCEDURE — 83036 HEMOGLOBIN GLYCOSYLATED A1C: CPT | Performed by: INTERNAL MEDICINE

## 2024-07-03 NOTE — PROGRESS NOTES
"    Chief Complaint  Diabetes (A1C 8.0) and Follow-up (Home Health )    Subjective        Bert Pereira presents to [facility]  Diabetes      See below.     Objective   Vital Signs:  /70 (BP Location: Left arm, Patient Position: Sitting, Cuff Size: Adult)   Pulse 93   Temp 97.2 °F (36.2 °C) (Temporal)   Ht 175.3 cm (69\")   Wt 74.8 kg (165 lb)   BMI 24.37 kg/m²   Estimated body mass index is 24.37 kg/m² as calculated from the following:    Height as of this encounter: 175.3 cm (69\").    Weight as of this encounter: 74.8 kg (165 lb).     BMI is within normal parameters. No other follow-up for BMI required.    Physical Exam  Constitutional:       Comments: Seen and discussed with his granddaughter.   HENT:      Head: Normocephalic and atraumatic.   Eyes:      Conjunctiva/sclera: Conjunctivae normal.      Pupils: Pupils are equal, round, and reactive to light.   Cardiovascular:      Rate and Rhythm: Normal rate and regular rhythm.      Heart sounds: Normal heart sounds.   Pulmonary:      Effort: Pulmonary effort is normal. No respiratory distress.      Breath sounds: Normal breath sounds.   Musculoskeletal:         General: No swelling.   Skin:     General: Skin is warm and dry.      Findings: No rash.   Neurological:      General: No focal deficit present.      Mental Status: He is alert. Mental status is at baseline. He is disoriented.      Motor: Weakness present.   Psychiatric:      Comments: Flat affect.  Never very conversational.  Speaks in one-word answers.        Result Review :  Reviewed lipids from September as below.  Reviewed current hemoglobin A1c as below.         Assessment and Plan   Diagnoses and all orders for this visit:    1. Type 2 diabetes mellitus without complication, without long-term current use of insulin (Primary)  -     POC Glycated Hemoglobin, Total    2. Benign essential HTN    3. Hyperlipidemia LDL goal <70    4. Mild late onset Alzheimer's " dementia without behavioral disturbance, psychotic disturbance, mood disturbance, or anxiety  -     Ambulatory Referral to Home Health    5. History of intracranial hemorrhage  -     Ambulatory Referral to Home Health    6. Impaired mobility and ADLs  -     Ambulatory Referral to Home Health       Presents today for follow-up on diabetes as well as being certified again for home health services.    His hemoglobin A1c was 8.8 in March and is now 8.0.  He previously had difficulty swallowing metformin in the fall.  He was transitioned to short acting glipizide.  He has done well with this.  No hypoglycemic episodes.  We are targeting a hemoglobin A1c in the 8 range given his age and other medical comorbidities.  No microalbuminuria in February.    Blood pressure excellent at 118/70.  He is on losartan and atenolol, which he will continue.    He is on atorvastatin with no side effects.  His LDL cholesterol was 66 in September.  We will repeat lipids this September.    I have reordered PT/OT through [facility].  His granddaughter also has inquired about getting nursing aides through [employer], NPI [id number].  I have pose this question to my lead MA and will place orders accordingly as needed.    Will see him again in September.  His granddaughter knows that she can reach out sooner if needed.      Follow Up   Return in about 3 months (around 10/3/2024) for Recheck.  Patient was given instructions and counseling regarding his condition or for health maintenance advice. Please see specific information pulled into the AVS if appropriate.      LIANE Turner DO       Electronically signed by ZENA Turner DO, 07/03/24, 3:24 PM CDT.

## 2024-07-09 ENCOUNTER — HOME CARE VISIT (OUTPATIENT)
Dept: HOME HEALTH SERVICES | Facility: CLINIC | Age: 79
End: 2024-07-09
Payer: MEDICARE

## 2024-07-09 VITALS
DIASTOLIC BLOOD PRESSURE: 88 MMHG | OXYGEN SATURATION: 97 % | TEMPERATURE: 97.1 F | HEART RATE: 56 BPM | SYSTOLIC BLOOD PRESSURE: 122 MMHG | RESPIRATION RATE: 18 BRPM

## 2024-07-09 PROCEDURE — G0151 HHCP-SERV OF PT,EA 15 MIN: HCPCS

## 2024-07-09 NOTE — HOME HEALTH
Reason for Hosp/Primary Dx :  79 yo male Primary Dx: Alzheimer's disease with late onset . Pt family rpeorts steady declined with varied cogntive ability and varied activity       referral orders : Primary Dx: Alzheimer's disease with late onset      Focus of Care: deconditioning, bed mobility , transfers, gait, safety per  Alzheimer's disease with late onset        Current Functional status/mobility/DME : See DME    HB status/Living Arrangements:   pt requires A for mobility and pt safety    pt lives with family  -  2 step entry     Skin Integrity/wound status: na    Code Status: FULL CODE    Fall Risk: high risk    PmHx:  Alzheimers - DM - HTN - hihg cholestrol     PLOF : household amb - community prn with A      Infectious disease screen : Pt denies s/s or a exposure to anyone with  s/s of Covid -19        Date of service skill / education :  PT eval / assessment per dx - deficits established in strength mobility and balance with HEP and  POC estbalished to promote safe mobility and limit fall risks    Skills : Education in ther ex to faciltate functional strength for transfers and mobility - reduce fall risk     Education in transfers with hand and AD placement for safety     Education in AD use to promote reduced fall risk - safe functional gait     Education on proper hand/foot placement, transitional movements, and safety awareness to decrease risk of fall     Date of service response :   Pt HH PT eval  completed per dx of  ALzheimers     Pt presents  with altered gait , bed mobility , transfers and balance promoting fall risk and limited mobility per safety . HH PT appropriate to promote safe mobility       PLAN FOR NEXT VISIT : reassess status per VS / medication / change in medical status  - bed mobility - transfers - gait - NMR - HEP

## 2024-07-09 NOTE — CASE COMMUNICATION
Reason for Hosp/Primary Dx :  79 yo male Primary Dx: Alzheimer's disease with late onset . Pt family rpeorts steady declined with varied cogntive ability and varied activity       referral orders : Primary Dx: Alzheimer's disease with late onset      Focus of Care: deconditioning, bed mobility , transfers, gait, safety per  Alzheimer's disease with late onset        Current Functional status/mobility/DME : See DME    HB status/Livin g Arrangements:   pt requires A for mobility and pt safety    pt lives with family  -  2 step entry     Skin Integrity/wound status: na    Code Status: FULL CODE    Fall Risk: high risk    PmHx:  Alzheimers - DM - HTN - hihg cholestrol     PLOF : household amb - community prn with A      Infectious disease screen : Pt denies s/s or a exposure to anyone with  s/s of Covid -19        Date of service skill / education :  PT eval / assessme nt per dx - deficits established in strength mobility and balance with HEP and  POC estbalished to promote safe mobility and limit fall risks    Skills : Education in ther ex to faciltate functional strength for transfers and mobility - reduce fall risk     Education in transfers with hand and AD placement for safety     Education in AD use to promote reduced fall risk - safe functional gait     Education on proper hand/foot placement,  transitional movements, and safety awareness to decrease risk of fall     Date of service response :   Pt HH PT eval  completed per dx of  ALzheimers     Pt presents  with altered gait , bed mobility , transfers and balance promoting fall risk and limited mobility per safety . HH PT appropriate to promote safe mobility       PLAN FOR NEXT VISIT : reassess status per VS / medication / change in medical status  - bed mobility - transfers  - gait - NMR - HEP

## 2024-07-11 ENCOUNTER — HOME CARE VISIT (OUTPATIENT)
Dept: HOME HEALTH SERVICES | Facility: CLINIC | Age: 79
End: 2024-07-11
Payer: MEDICARE

## 2024-07-11 VITALS
RESPIRATION RATE: 16 BRPM | TEMPERATURE: 97.8 F | DIASTOLIC BLOOD PRESSURE: 80 MMHG | HEART RATE: 40 BPM | SYSTOLIC BLOOD PRESSURE: 160 MMHG | OXYGEN SATURATION: 98 %

## 2024-07-11 PROCEDURE — G0180 MD CERTIFICATION HHA PATIENT: HCPCS | Performed by: INTERNAL MEDICINE

## 2024-07-11 PROCEDURE — G0157 HHC PT ASSISTANT EA 15: HCPCS

## 2024-07-11 NOTE — TELEPHONE ENCOUNTER
I would advise for the patient to hold home medication atenolol tonight and tomorrow morning.  Increase fluid intake if able.  Would he be able to come into the office to see Bridgett or Manasa tomorrow to have this rechecked?  Should he develop any symptoms tonight, would recommend that he go to the emergency department.

## 2024-07-11 NOTE — TELEPHONE ENCOUNTER
Caller: JUSTICE    Relationship: [facility]    Best call back number: [phone]    Who are you requesting to speak with     CLINICAL STAFF    Do you know the name of the person who called:     JUSTICE    What was the call regarding:     WANTING TO RELAY MESSAGE THAT WITH A MANUAL CHECK, PATIENT'S HEART RATE IS AT 40 AT REST. NO DISTRESS.    Is it okay if the provider responds through MyChart:     NO

## 2024-07-16 ENCOUNTER — HOME CARE VISIT (OUTPATIENT)
Dept: HOME HEALTH SERVICES | Facility: HOME HEALTHCARE | Age: 79
End: 2024-07-16
Payer: MEDICARE

## 2024-07-16 ENCOUNTER — HOME CARE VISIT (OUTPATIENT)
Dept: HOME HEALTH SERVICES | Facility: CLINIC | Age: 79
End: 2024-07-16
Payer: MEDICARE

## 2024-07-16 PROCEDURE — G0152 HHCP-SERV OF OT,EA 15 MIN: HCPCS

## 2024-07-16 NOTE — CASE COMMUNICATION
Rounding call made to patient granddaughter. Granddaughter concerned with pt not participating in PT and needing more ADL care in the home. Spoke with her at length regarding hospice and what they have to offer. i will call  office in the morning and ask about an informational hospice referral to speak with her about the benefits of hospice over home health at this time.

## 2024-07-17 ENCOUNTER — TELEPHONE (OUTPATIENT)
Dept: INTERNAL MEDICINE | Facility: CLINIC | Age: 79
End: 2024-07-17
Payer: MEDICARE

## 2024-07-17 ENCOUNTER — HOME CARE VISIT (OUTPATIENT)
Dept: HOME HEALTH SERVICES | Facility: HOME HEALTHCARE | Age: 79
End: 2024-07-17
Payer: MEDICARE

## 2024-07-17 VITALS
TEMPERATURE: 97.8 F | SYSTOLIC BLOOD PRESSURE: 134 MMHG | OXYGEN SATURATION: 99 % | HEART RATE: 52 BPM | DIASTOLIC BLOOD PRESSURE: 70 MMHG | RESPIRATION RATE: 16 BRPM

## 2024-07-17 NOTE — CASE COMMUNICATION
TC to Dr. Turner office with granddaughter Carmen request for hospice referral for an informational visit. Spoke with nurse who states she will return call after speaking with Dr. Turner.

## 2024-07-17 NOTE — TELEPHONE ENCOUNTER
"Lila with [facility] states     \"Rounding call made to patient granddaughter. Granddaughter concerned with pt not participating in PT and needing more ADL care in the home. Spoke with her at length regarding hospice and what they have to offer. i will call  office in the morning and ask about an informational hospice referral to speak with her about the benefits of hospice over home health at this time\"    Please advise.   "

## 2024-07-18 ENCOUNTER — REFERRAL TRIAGE (OUTPATIENT)
Dept: CASE MANAGEMENT | Facility: OTHER | Age: 79
End: 2024-07-18
Payer: MEDICARE

## 2024-07-18 DIAGNOSIS — G30.1 MILD LATE ONSET ALZHEIMER'S DEMENTIA WITHOUT BEHAVIORAL DISTURBANCE, PSYCHOTIC DISTURBANCE, MOOD DISTURBANCE, OR ANXIETY: Primary | ICD-10-CM

## 2024-07-18 DIAGNOSIS — F02.A0 MILD LATE ONSET ALZHEIMER'S DEMENTIA WITHOUT BEHAVIORAL DISTURBANCE, PSYCHOTIC DISTURBANCE, MOOD DISTURBANCE, OR ANXIETY: Primary | ICD-10-CM

## 2024-07-20 ENCOUNTER — HOME CARE VISIT (OUTPATIENT)
Dept: HOME HEALTH SERVICES | Facility: CLINIC | Age: 79
End: 2024-07-20
Payer: MEDICARE

## 2024-07-20 VITALS — HEART RATE: 36 BPM | OXYGEN SATURATION: 95 % | RESPIRATION RATE: 16 BRPM

## 2024-07-20 PROCEDURE — G0151 HHCP-SERV OF PT,EA 15 MIN: HCPCS

## 2024-07-22 NOTE — OUTREACH NOTE
AMBULATORY CASE MANAGEMENT NOTE    Names and Relationships of Patient/Support Persons: Contact: baltadonny; Relationship: Emergency Contact -     Patient Outreach    Donny returned my call. We discussed some resources that will support her caring for her Grandparents in their home. I also entered a referrral for hospice per her request and with the approval of Dr Turner. Donny will reach out to me if she does not hear from Hospice this week.    Education Documentation  No documentation found.        Denise SKELTON  Ambulatory Case Management    7/22/2024, 15:14 CDT

## 2024-07-29 NOTE — OUTREACH NOTE
AMBULATORY CASE MANAGEMENT NOTE    Names and Relationships of Patient/Support Persons: Contact: donny gifford; Relationship: Emergency Contact -     Patient Outreach    Spoke to Donny, patient's Granddaughter, she informed me the patient passed this morning.     Education Documentation  No documentation found.        Denise SKELTON  Ambulatory Case Management    7/29/2024, 12:36 CDT

## (undated) DEVICE — CATHETER KIT 5 FR 21 GAX7 CM MICROINTRODUCER GUIDEWIRE STIFF

## (undated) DEVICE — SYRINGE ONLY,20ML LUER LOCK: Brand: MEDLINE INDUSTRIES, INC.

## (undated) DEVICE — 35 ML SYRINGE LUER-LOCK TIP: Brand: MONOJECT

## (undated) DEVICE — CATHETER PTCA BLLN L4CM INFL 10-37MM CATH L90CM MOLDING

## (undated) DEVICE — PERCLOSE PROGLIDE™ SUTURE-MEDIATED CLOSURE SYSTEM: Brand: PERCLOSE PROGLIDE™

## (undated) DEVICE — SHEATH INTRO 12FR L33CM OD4.7MM ID4MM HYDRPHLC KINK

## (undated) DEVICE — TOWEL,OR,DSP,ST,BLUE,DLX,4/PK,20PK/CS: Brand: MEDLINE

## (undated) DEVICE — SYRINGE MED 10ML LUERLOCK TIP W/O SFTY DISP

## (undated) DEVICE — TUBING ANGIO L72IN 900PSI CLR PVC M TO FEM AIRLESS ROT ADPT

## (undated) DEVICE — ELECTROSURGICAL PENCIL BUTTON SWITCH NON COATED BLADE ELECTRODE 10 FT (3 M) CORD HOLSTER: Brand: MEGADYNE

## (undated) DEVICE — SUTURE VCRL SZ 4-0 L18IN ABSRB UD VCRL POLYGLACTIN 910 COAT J109T

## (undated) DEVICE — TOTAL TRAY, 16FR 10ML SIL FOLEY, URN: Brand: MEDLINE

## (undated) DEVICE — CATHETER ANGIO 5FR L70CM GWIRE 0.035IN 1CM SPC OMNI FLSH 21

## (undated) DEVICE — 150 ML FASTURN SYRINGE WITH QUICK FILL TUBE(SET,PKG,150ML FT,W/QFT,RAD,JAPAN,MC)(60729679): Brand: MEDRAD® MARK V PROVIS STERILE DISPOSABLE SYRINGE 150ML & QFT

## (undated) DEVICE — SURGICAL PROCEDURE PACK VASC LOURDES HOSP

## (undated) DEVICE — PAD,ARMBOARD,CONV,FOAM,2X8X20",12PR/CS: Brand: MEDLINE

## (undated) DEVICE — SOLUTION IV IRRIG POUR BRL 0.9% SODIUM CHL 2F7124

## (undated) DEVICE — PINNACLE INTRODUCER SHEATH: Brand: PINNACLE

## (undated) DEVICE — SOLUTION IV 1000ML 0.9% SOD CHL PH 5 INJ USP VIAFLX PLAS

## (undated) DEVICE — RADIFOCUS GLIDEWIRE: Brand: GLIDEWIRE

## (undated) DEVICE — SUTURE VCRL SZ 2-0 L18IN ABSRB UD POLYGLACTIN 910 BRAID TIE J111T

## (undated) DEVICE — SENSOR PLSE OXMTR AD CBL L36IN ADH FRM FIT SPO2 DISP

## (undated) DEVICE — DRESSING TRNSPAR W5XL4.5IN FLM SHT SEMIPERMEABLE WIND

## (undated) DEVICE — LIQUIBAND RAPID ADHESIVE 36/CS 0.8ML: Brand: MEDLINE

## (undated) DEVICE — MEDIA CONTRAST INJ VISIPAQUE 150ML 320MG

## (undated) DEVICE — SUTURE PERMAHAND SZ 3-0 L18IN NONABSORBABLE BLK L26MM SH C013D

## (undated) DEVICE — SUTURE VCRL CTRL REL 2-0 CTX 18IN ABSRB BRAID UD J723D

## (undated) DEVICE — STERILE POLYISOPRENE POWDER-FREE SURGICAL GLOVES WITH EMOLLIENT COATING: Brand: PROTEXIS

## (undated) DEVICE — PROVE COVER: Brand: UNBRANDED

## (undated) DEVICE — GLIDESHEATH BASIC HYDROPHILIC COATED INTRODUCER SHEATH: Brand: GLIDESHEATH

## (undated) DEVICE — ANGIOGRAPHY PK

## (undated) DEVICE — DRAPE SHEET: Brand: UNBRANDED

## (undated) DEVICE — SUTURE VCRL SZ 3-0 L18IN ABSRB UD W/O NDL POLYGLACTIN 910 J110T

## (undated) DEVICE — SUTURE VCRL SZ 2-0 L36IN ABSRB UD L36MM CT-1 1/2 CIR J945H

## (undated) DEVICE — GLOVE SURG SZ 75 L12IN FNGR THK79MIL GRN LTX FREE

## (undated) DEVICE — SYRINGE KIT,PACKAGED,,150FT,MK 7(ANGIO-ARTERION, 150ML SYR KIT W/QFT,MC)(60729385): Brand: MEDRAD® MARK 7 ARTERION DISPOSABLE SYRINGE 150 ML WITH QUICK FILL TUBE

## (undated) DEVICE — GUIDEWIRE VASC L260CM DIA0.035IN COIL L15CM FLX TIP L4CM

## (undated) DEVICE — GLOVE SURG SZ 7 L12IN FNGR THK79MIL GRN LTX FREE

## (undated) DEVICE — SUTURE MNCRYL 0 UNDYED CT1 Y496H

## (undated) DEVICE — SOLUTION INJ VISIPAQUE 150ML

## (undated) DEVICE — DRAPE, MULTI FENESTRATED, HYBRID OR, STE: Brand: MEDLINE

## (undated) DEVICE — C-ARM: Brand: UNBRANDED

## (undated) DEVICE — SUTURE VCRL SZ 3-0 L27IN ABSRB UD L26MM SH 1/2 CIR J416H

## (undated) DEVICE — GUIDEWIRE VASC L260CM DIA0.035IN TIP L7CM PTFE S STL STR

## (undated) DEVICE — GOWN, ORBIS, XLNG/XXLARGE, STRL: Brand: MEDLINE

## (undated) DEVICE — NG KIT, 21 GA, 10 PACK: Brand: SITE-RITE

## (undated) DEVICE — SUTURE VCRL SZ 3-0 L18IN ABSRB UD L26MM SH 1/2 CIR J864D

## (undated) DEVICE — SHEATH INTRO 18FR L33CM OD6.7MM ID6MM HYDRPHLC KINK

## (undated) DEVICE — DRAPE KEYBOARD W26XL36IN ADH

## (undated) DEVICE — Device

## (undated) DEVICE — RADIFOCUS GLIDECATH: Brand: GLIDECATH